# Patient Record
Sex: MALE | Race: WHITE | NOT HISPANIC OR LATINO | Employment: OTHER | URBAN - METROPOLITAN AREA
[De-identification: names, ages, dates, MRNs, and addresses within clinical notes are randomized per-mention and may not be internally consistent; named-entity substitution may affect disease eponyms.]

---

## 2017-01-16 ENCOUNTER — MYC MEDICAL ADVICE (OUTPATIENT)
Dept: FAMILY MEDICINE | Facility: CLINIC | Age: 72
End: 2017-01-16

## 2017-01-16 DIAGNOSIS — R07.89 ATYPICAL CHEST PAIN: ICD-10-CM

## 2017-01-16 DIAGNOSIS — N40.0 BENIGN NON-NODULAR PROSTATIC HYPERPLASIA WITHOUT LOWER URINARY TRACT SYMPTOMS: ICD-10-CM

## 2017-01-16 DIAGNOSIS — E78.5 HYPERLIPIDEMIA LDL GOAL <100: ICD-10-CM

## 2017-01-16 DIAGNOSIS — I10 ESSENTIAL HYPERTENSION WITH GOAL BLOOD PRESSURE LESS THAN 140/90: ICD-10-CM

## 2017-01-16 RX ORDER — TAMSULOSIN HYDROCHLORIDE 0.4 MG/1
0.4 CAPSULE ORAL DAILY
Qty: 30 CAPSULE | Refills: 9 | Status: SHIPPED | OUTPATIENT
Start: 2017-01-16 | End: 2017-01-16

## 2017-01-16 RX ORDER — PRAVASTATIN SODIUM 20 MG
20 TABLET ORAL DAILY
Qty: 90 TABLET | Refills: 1 | Status: SHIPPED | OUTPATIENT
Start: 2017-01-16 | End: 2017-08-18

## 2017-01-16 RX ORDER — GLIPIZIDE 5 MG/1
TABLET ORAL
Qty: 90 TABLET | Refills: 1 | Status: SHIPPED | OUTPATIENT
Start: 2017-01-16 | End: 2017-03-21

## 2017-01-16 RX ORDER — TAMSULOSIN HYDROCHLORIDE 0.4 MG/1
0.4 CAPSULE ORAL DAILY
Qty: 90 CAPSULE | Refills: 1 | Status: SHIPPED | OUTPATIENT
Start: 2017-01-16 | End: 2017-10-10

## 2017-01-16 RX ORDER — METOPROLOL SUCCINATE 25 MG/1
12.5 TABLET, EXTENDED RELEASE ORAL 2 TIMES DAILY
Qty: 90 TABLET | Refills: 1 | Status: SHIPPED | OUTPATIENT
Start: 2017-01-16 | End: 2017-10-10

## 2017-01-16 RX ORDER — LISINOPRIL 2.5 MG/1
2.5 TABLET ORAL 2 TIMES DAILY
Qty: 180 TABLET | Refills: 1 | Status: SHIPPED | OUTPATIENT
Start: 2017-01-16 | End: 2017-10-10

## 2017-01-16 RX ORDER — GLIPIZIDE 5 MG/1
TABLET ORAL
Qty: 90 TABLET | Refills: 2 | Status: SHIPPED | OUTPATIENT
Start: 2017-01-16 | End: 2017-01-16

## 2017-01-16 RX ORDER — DOXAZOSIN 4 MG/1
4 TABLET ORAL AT BEDTIME
Qty: 90 TABLET | Refills: 1 | Status: SHIPPED | OUTPATIENT
Start: 2017-01-16 | End: 2017-09-12

## 2017-01-24 ENCOUNTER — TELEPHONE (OUTPATIENT)
Dept: FAMILY MEDICINE | Facility: CLINIC | Age: 72
End: 2017-01-24

## 2017-01-24 DIAGNOSIS — I10 ESSENTIAL HYPERTENSION WITH GOAL BLOOD PRESSURE LESS THAN 140/90: ICD-10-CM

## 2017-01-24 NOTE — TELEPHONE ENCOUNTER
A referral is recommended for health  in regards to diabetes/htn.  Referral written and sent to health  to contact patient.    Thanks  CAMMIE Acharya---[facility]

## 2017-02-21 ENCOUNTER — OFFICE VISIT (OUTPATIENT)
Dept: CARDIOLOGY | Facility: CLINIC | Age: 72
End: 2017-02-21
Payer: MEDICARE

## 2017-02-21 VITALS
DIASTOLIC BLOOD PRESSURE: 72 MMHG | HEART RATE: 60 BPM | BODY MASS INDEX: 26.63 KG/M2 | WEIGHT: 175.7 LBS | HEIGHT: 68 IN | SYSTOLIC BLOOD PRESSURE: 130 MMHG

## 2017-02-21 DIAGNOSIS — E78.5 HYPERLIPIDEMIA LDL GOAL <100: ICD-10-CM

## 2017-02-21 DIAGNOSIS — I10 ESSENTIAL HYPERTENSION WITH GOAL BLOOD PRESSURE LESS THAN 140/90: ICD-10-CM

## 2017-02-21 DIAGNOSIS — R07.89 ATYPICAL CHEST PAIN: ICD-10-CM

## 2017-02-21 DIAGNOSIS — R35.1 NOCTURIA: ICD-10-CM

## 2017-02-21 DIAGNOSIS — R00.2 HEART PALPITATIONS: ICD-10-CM

## 2017-02-21 DIAGNOSIS — R06.09 DYSPNEA ON EXERTION: ICD-10-CM

## 2017-02-21 DIAGNOSIS — R42 LIGHTHEADEDNESS: ICD-10-CM

## 2017-02-21 DIAGNOSIS — R53.83 FATIGUE, UNSPECIFIED TYPE: ICD-10-CM

## 2017-02-21 PROCEDURE — 99213 OFFICE O/P EST LOW 20 MIN: CPT | Performed by: INTERNAL MEDICINE

## 2017-02-21 NOTE — PATIENT INSTRUCTIONS
For the LISINOPRIL (blood pressure medication), you have pills 2.5mg supposedly twice per day. You may take both of the 2.5mg pills at the same time in the evening and that will help the blood pressure get down to the 110-120 range no problem.

## 2017-02-21 NOTE — MR AVS SNAPSHOT
After Visit Summary   2/21/2017    Kushal Bush    MRN: [id number]           Patient Information     Date Of Birth          1945        Visit Information        Provider Department      2/21/2017 2:45 PM Daniel Win MD [facility]        Today's Diagnoses     Essential hypertension with goal blood pressure less than 140/90        Hyperlipidemia LDL goal <100        Heart palpitations        Fatigue, unspecified type        Dyspnea on exertion        Lightheadedness        Nocturia        Atypical chest pain          Care Instructions    For the LISINOPRIL (blood pressure medication), you have pills 2.5mg supposedly twice per day. You may take both of the 2.5mg pills at the same time in the evening and that will help the blood pressure get down to the 110-120 range no problem.            Follow-ups after your visit        Who to contact     If you have questions or need follow up information about today's clinic visit or your schedule please contact [facility] directly at [phone].  Normal or non-critical lab and imaging results will be communicated to you by [employer], letter or phone within 4 business days after the clinic has received the results. If you do not hear from us within 7 days, please contact the clinic through [employer] or phone. If you have a critical or abnormal lab result, we will notify you by phone as soon as possible.  Submit refill requests through [employer] or call your pharmacy and they will forward the refill request to us. Please allow 3 business days for your refill to be completed.          Additional Information About Your Visit        MyChart Information     [employer] gives you secure access to your electronic health record. If you see a primary care provider, you can also send messages to your care team and make appointments. If you have questions, please call your primary  "care clinic.  If you do not have a primary care provider, please call [phone] and they will assist you.        Care EveryWhere ID     This is your Care EveryWhere ID. This could be used by other organizations to access your [place] medical records  [id number]        Your Vitals Were     Pulse Height BMI (Body Mass Index)             60 1.727 m (5' 8\") 26.72 kg/m2          Blood Pressure from Last 3 Encounters:   02/21/17 130/72   12/19/16 112/70   11/29/16 138/80    Weight from Last 3 Encounters:   02/21/17 79.7 kg (175 lb 11.2 oz)   12/19/16 78.5 kg (173 lb)   11/29/16 78 kg (172 lb)              We Performed the Following     Follow-Up with Cardiologist          Today's Medication Changes          These changes are accurate as of: 2/21/17  3:17 PM.  If you have any questions, ask your nurse or doctor.               These medicines have changed or have updated prescriptions.        Dose/Directions    metoprolol 25 MG 24 hr tablet   Commonly known as:  TOPROL-XL   This may have changed:  when to take this   Used for:  Atypical chest pain, Essential hypertension with goal blood pressure less than 140/90        Dose:  12.5 mg   Take 0.5 tablets (12.5 mg) by mouth 2 times daily   Quantity:  90 tablet   Refills:  1                Primary Care Provider Office Phone # Fax #    Albert Huggins [phone]       [address] [address]        Thank you!     Thank you for choosing [facility] [place]  for your care. Our goal is always to provide you with excellent care. Hearing back from our patients is one way we can continue to improve our services. Please take a few minutes to complete the written survey that you may receive in the mail after your visit with us. Thank you!             Your Updated Medication List - Protect others around you: Learn how to safely use, store and throw away your medicines at " www.disposemymeds.org.          This list is accurate as of: 2/21/17  3:17 PM.  Always use your most recent med list.                   Brand Name Dispense Instructions for use    aspirin 81 MG tablet     30 tablet    Take 1 tablet (81 mg) by mouth daily       blood glucose monitoring test strip    TRUETRACK TEST    100 each    Use to test blood sugar 2 times daily or as directed.  Ok to substitute alternative if insurance prefers.       doxazosin 4 MG tablet    CARDURA    90 tablet    Take 1 tablet (4 mg) by mouth At Bedtime       glipiZIDE 5 MG tablet    GLUCOTROL    90 tablet    1 tab tid with meals       lisinopril 2.5 MG tablet    PRINIVIL/Zestril    180 tablet    Take 1 tablet (2.5 mg) by mouth 2 times daily       metoprolol 25 MG 24 hr tablet    TOPROL-XL    90 tablet    Take 0.5 tablets (12.5 mg) by mouth 2 times daily       pravastatin 20 MG tablet    PRAVACHOL    90 tablet    Take 1 tablet (20 mg) by mouth daily       tamsulosin 0.4 MG capsule    FLOMAX    90 capsule    Take 1 capsule (0.4 mg) by mouth daily

## 2017-02-21 NOTE — PROGRESS NOTES
Cardiology Progress Note          Assessment and Plan:     1. Mild vascular disease, carotid arteries    Patient is tolerating pravastatin.  Continue this and weight loss for better diabetes control.      2. Hypertension, good control on current regimen with improved side effect profile    Patient may consolidate his lisinopril 2.5 mg twice per day that he is only taking in the evenings to 5 mg once per evening.    At this time, I feel like the patient is on a stable regimen and may follow-up in cardiology as needed in the future.  He is very happy with the care he has received. All questions answered to the best of my ability.      This note was transcribed using electronic voice recognition software and there may be typographical errors present.                Interval History:   The patient is a very pleasant 71 year old whom I met recently in cardiovascular consultation for hypertension and vascular workup.  He was having considerable symptoms on his high dose of Toprol.  These improved quite a bit when his Toprol dose was reduced.  He feels well.  He denies any exertional chest discomfort.  He is trying to lose weight but has had difficulty.  He does have diabetes on oral hypoglycemics.  He sometimes forgets to take his daytime antihypertensives.  He has better compliance in the evening.      We reviewed his vascular testing which has mild plaque in the carotid arteries, negative stress echocardiogram without significant structural abnormality and Ziopatch monitor with brief, asymptomatic SVT.                       Review of Systems:   Review of Systems:  Skin:  Negative     Eyes:  Positive for glasses  ENT:  Positive for postnasal drainage  Respiratory:  Positive for    Cardiovascular:    Positive for;fatigue;lightheadedness  Gastroenterology: Negative    Genitourinary:  not assessed    Musculoskeletal:  Positive for arthritis  Neurologic:  Negative    Psychiatric:  not assessed    Heme/Lymph/Imm:  Positive  "for night sweats  Endocrine:  Positive for diabetes              Physical Exam:     Vitals: /72 (BP Location: Right arm, Patient Position: Chair, Cuff Size: Adult Regular)  Pulse 60  Ht 1.727 m (5' 8\")  Wt 79.7 kg (175 lb 11.2 oz)  BMI 26.72 kg/m2  Constitutional:  cooperative, alert and oriented, well developed, well nourished, in no acute distress        Skin:  warm and dry to the touch, no apparent skin lesions or masses noted        Head:  normocephalic, no masses or lesions        Eyes:  pupils equal and round, conjunctivae and lids unremarkable, sclera white, no xanthalasma, EOMS intact, no nystagmus        ENT:  no pallor or cyanosis, dentition good        Neck:  JVP normal        Chest:  normal breath sounds, clear to auscultation, normal A-P diameter, normal symmetry, normal respiratory excursion, no use of accessory muscles        Cardiac: regular rhythm       grade 1;RUSB          Abdomen:      benign    Vascular: pulses full and equal                                      Extremities and Back:  no deformities, clubbing, cyanosis, erythema observed;no edema        Neurological:  affect appropriate, oriented to time, person and place;no gross motor deficits                 Medications:     Current Outpatient Prescriptions   Medication Sig Dispense Refill     blood glucose monitoring (TRUETRACK TEST) test strip Use to test blood sugar 2 times daily or as directed.  Ok to substitute alternative if insurance prefers. 100 each 5     lisinopril (PRINIVIL/ZESTRIL) 2.5 MG tablet Take 1 tablet (2.5 mg) by mouth 2 times daily 180 tablet 1     metoprolol (TOPROL-XL) 25 MG 24 hr tablet Take 0.5 tablets (12.5 mg) by mouth 2 times daily (Patient taking differently: Take 12.5 mg by mouth daily ) 90 tablet 1     pravastatin (PRAVACHOL) 20 MG tablet Take 1 tablet (20 mg) by mouth daily 90 tablet 1     doxazosin (CARDURA) 4 MG tablet Take 1 tablet (4 mg) by mouth At Bedtime 90 tablet 1     glipiZIDE (GLUCOTROL) 5 MG " tablet 1 tab tid with meals 90 tablet 1     aspirin 81 MG tablet Take 1 tablet (81 mg) by mouth daily 30 tablet 1     tamsulosin (FLOMAX) 0.4 MG capsule Take 1 capsule (0.4 mg) by mouth daily (Patient not taking: Reported on 2/21/2017) 90 capsule 1                Data:   All laboratory data reviewed  No results found for this or any previous visit (from the past 24 hour(s)).    All laboratory data reviewed  Lab Results   Component Value Date    CHOL 158 01/14/2013     Lab Results   Component Value Date    HDL 37 01/14/2013     Lab Results   Component Value Date    LDL 79 07/06/2016    LDL 89 01/14/2013     Lab Results   Component Value Date    TRIG 160 01/14/2013     Lab Results   Component Value Date    CHOLHDLRATIO 4.3 01/14/2013     TSH   Date Value Ref Range Status   07/06/2016 3.51 0.40 - 4.00 mU/L Final     Last Basic Metabolic Panel:  Lab Results   Component Value Date     07/06/2016      Lab Results   Component Value Date    POTASSIUM 3.9 07/06/2016     Lab Results   Component Value Date    CHLORIDE 102 07/06/2016     Lab Results   Component Value Date    RAJWINDER 9.1 07/06/2016     Lab Results   Component Value Date    CO2 34 07/06/2016     Lab Results   Component Value Date    BUN 20 07/06/2016     Lab Results   Component Value Date    CR 1.00 07/06/2016     Lab Results   Component Value Date     07/06/2016     Lab Results   Component Value Date    WBC 5.3 04/08/2015     Lab Results   Component Value Date    RBC 5.35 04/08/2015     Lab Results   Component Value Date    HGB 16.2 04/08/2015     Lab Results   Component Value Date    HCT 46.7 04/08/2015     Lab Results   Component Value Date    MCV 87 04/08/2015     Lab Results   Component Value Date    MCH 30.3 04/08/2015     Lab Results   Component Value Date    MCHC 34.7 04/08/2015     Lab Results   Component Value Date    RDW 12.4 04/08/2015     Lab Results   Component Value Date     04/08/2015

## 2017-02-21 NOTE — LETTER
2/21/2017    Albert Huggins MD  [facility]   [address]  [facility] [address]    RE: Pedrobarrera SHRADDHA Bush       Dear Colleague,    I had the pleasure of seeing Kushal Bush in the [facility].    Cardiology Progress Note          Assessment and Plan:     1. Mild vascular disease, carotid arteries    Patient is tolerating pravastatin.  Continue this and weight loss for better diabetes control.      2. Hypertension, good control on current regimen with improved side effect profile    Patient may consolidate his lisinopril 2.5 mg twice per day that he is only taking in the evenings to 5 mg once per evening.    At this time, I feel like the patient is on a stable regimen and may follow-up in cardiology as needed in the future.  He is very happy with the care he has received. All questions answered to the best of my ability.      This note was transcribed using electronic voice recognition software and there may be typographical errors present.                Interval History:   The patient is a very pleasant 71 year old whom I met recently in cardiovascular consultation for hypertension and vascular workup.  He was having considerable symptoms on his high dose of Toprol.  These improved quite a bit when his Toprol dose was reduced.  He feels well.  He denies any exertional chest discomfort.  He is trying to lose weight but has had difficulty.  He does have diabetes on oral hypoglycemics.  He sometimes forgets to take his daytime antihypertensives.  He has better compliance in the evening.      We reviewed his vascular testing which has mild plaque in the carotid arteries, negative stress echocardiogram without significant structural abnormality and Ziopatch monitor with brief, asymptomatic SVT.                       Review of Systems:   Review of Systems:  Skin:  Negative     Eyes:  Positive for glasses  ENT:  Positive for postnasal drainage  Respiratory:  Positive  "for    Cardiovascular:    Positive for;fatigue;lightheadedness  Gastroenterology: Negative    Genitourinary:  not assessed    Musculoskeletal:  Positive for arthritis  Neurologic:  Negative    Psychiatric:  not assessed    Heme/Lymph/Imm:  Positive for night sweats  Endocrine:  Positive for diabetes              Physical Exam:     Vitals: /72 (BP Location: Right arm, Patient Position: Chair, Cuff Size: Adult Regular)  Pulse 60  Ht 1.727 m (5' 8\")  Wt 79.7 kg (175 lb 11.2 oz)  BMI 26.72 kg/m2  Constitutional:  cooperative, alert and oriented, well developed, well nourished, in no acute distress        Skin:  warm and dry to the touch, no apparent skin lesions or masses noted        Head:  normocephalic, no masses or lesions        Eyes:  pupils equal and round, conjunctivae and lids unremarkable, sclera white, no xanthalasma, EOMS intact, no nystagmus        ENT:  no pallor or cyanosis, dentition good        Neck:  JVP normal        Chest:  normal breath sounds, clear to auscultation, normal A-P diameter, normal symmetry, normal respiratory excursion, no use of accessory muscles        Cardiac: regular rhythm       grade 1;RUSB          Abdomen:      benign    Vascular: pulses full and equal                                      Extremities and Back:  no deformities, clubbing, cyanosis, erythema observed;no edema        Neurological:  affect appropriate, oriented to time, person and place;no gross motor deficits                 Medications:     Current Outpatient Prescriptions   Medication Sig Dispense Refill     blood glucose monitoring (TRUETRACK TEST) test strip Use to test blood sugar 2 times daily or as directed.  Ok to substitute alternative if insurance prefers. 100 each 5     lisinopril (PRINIVIL/ZESTRIL) 2.5 MG tablet Take 1 tablet (2.5 mg) by mouth 2 times daily 180 tablet 1     metoprolol (TOPROL-XL) 25 MG 24 hr tablet Take 0.5 tablets (12.5 mg) by mouth 2 times daily (Patient taking differently: Take " 12.5 mg by mouth daily ) 90 tablet 1     pravastatin (PRAVACHOL) 20 MG tablet Take 1 tablet (20 mg) by mouth daily 90 tablet 1     doxazosin (CARDURA) 4 MG tablet Take 1 tablet (4 mg) by mouth At Bedtime 90 tablet 1     glipiZIDE (GLUCOTROL) 5 MG tablet 1 tab tid with meals 90 tablet 1     aspirin 81 MG tablet Take 1 tablet (81 mg) by mouth daily 30 tablet 1     tamsulosin (FLOMAX) 0.4 MG capsule Take 1 capsule (0.4 mg) by mouth daily (Patient not taking: Reported on 2/21/2017) 90 capsule 1                Data:   All laboratory data reviewed  No results found for this or any previous visit (from the past 24 hour(s)).    All laboratory data reviewed  Lab Results   Component Value Date    CHOL 158 01/14/2013     Lab Results   Component Value Date    HDL 37 01/14/2013     Lab Results   Component Value Date    LDL 79 07/06/2016    LDL 89 01/14/2013     Lab Results   Component Value Date    TRIG 160 01/14/2013     Lab Results   Component Value Date    CHOLHDLRATIO 4.3 01/14/2013     TSH   Date Value Ref Range Status   07/06/2016 3.51 0.40 - 4.00 mU/L Final     Last Basic Metabolic Panel:  Lab Results   Component Value Date     07/06/2016      Lab Results   Component Value Date    POTASSIUM 3.9 07/06/2016     Lab Results   Component Value Date    CHLORIDE 102 07/06/2016     Lab Results   Component Value Date    RAJWINDER 9.1 07/06/2016     Lab Results   Component Value Date    CO2 34 07/06/2016     Lab Results   Component Value Date    BUN 20 07/06/2016     Lab Results   Component Value Date    CR 1.00 07/06/2016     Lab Results   Component Value Date     07/06/2016     Lab Results   Component Value Date    WBC 5.3 04/08/2015     Lab Results   Component Value Date    RBC 5.35 04/08/2015     Lab Results   Component Value Date    HGB 16.2 04/08/2015     Lab Results   Component Value Date    HCT 46.7 04/08/2015     Lab Results   Component Value Date    MCV 87 04/08/2015     Lab Results   Component Value Date    MCH  30.3 04/08/2015     Lab Results   Component Value Date    MCHC 34.7 04/08/2015     Lab Results   Component Value Date    RDW 12.4 04/08/2015     Lab Results   Component Value Date     04/08/2015     Thank you for allowing me to participate in the care of your patient.    Sincerely,     Daniel Win MD     [facility]

## 2017-02-24 ENCOUNTER — TELEPHONE (OUTPATIENT)
Dept: FAMILY MEDICINE | Facility: CLINIC | Age: 72
End: 2017-02-24

## 2017-02-24 NOTE — LETTER
[facility]  [address] [address] [id number] [phone]  March 20, 2017    Kushal Bush  [address]  [facility] [address]    Dear Kushal,    I care about your health and have reviewed your health plan. I have reviewed your medical conditions, medication list, and lab results and am making recommendations based on this review, to better manage your health.    You are in particular need of attention regarding:  -Diabetes    I am recommending that you:  {recommendations:-schedule a WELLNESS (Physical) APPOINTMENT with me.   I will check fasting labs the same day - nothing to eat except water and meds for 8-10 hours prior.    Here is a list of Health Maintenance topics that are due now or due soon:  Health Maintenance Due   Topic Date Due     AORTIC ANEURYSM SCREENING (SYSTEM ASSIGNED)  10/29/2009     MEDICARE ANNUAL WELLNESS VISIT  04/15/2016     A1C Q3 MO( NO INBASKET)  02/21/2017     EYE EXAM Q1 YEAR( NO INBASKET)  04/11/2017       Please call us at [phone] (or use [employer]) to address the above recommendations.     Thank you for trusting [facility] and we appreciate the opportunity to serve you.  We look forward to supporting your healthcare needs in the future.    Healthy Regards,    Albert Huggins MD

## 2017-02-24 NOTE — TELEPHONE ENCOUNTER
Panel Management Review      Patient has the following on his problem list:     Asthma review     ACT Total Scores 11/21/2016   ACT TOTAL SCORE -   ASTHMA ER VISITS -   ASTHMA HOSPITALIZATIONS -   ACT TOTAL SCORE (Goal Greater than or Equal to 20) 24   In the past 12 months, how many times did you visit the emergency room for your asthma without being admitted to the hospital? 0   In the past 12 months, how many times were you hospitalized overnight because of your asthma? 0      1. Is Asthma diagnosis on the Problem List? Yes    2. Is Asthma listed on Health Maintenance? Yes    3. Patient is due for:  none    Diabetes    ASA: Passed    Last A1C  Lab Results   Component Value Date    A1C 9.2 11/21/2016    A1C 9.7 07/06/2016    A1C 8.1 11/30/2015    A1C 13.9 04/08/2015    A1C 9.6 06/09/2014     A1C tested: Failed    Last LDL:    Lab Results   Component Value Date    CHOL 158 01/14/2013     Lab Results   Component Value Date    HDL 37 01/14/2013     Lab Results   Component Value Date    LDL 79 07/06/2016    LDL 89 01/14/2013     Lab Results   Component Value Date    TRIG 160 01/14/2013     Lab Results   Component Value Date    CHOLHDLRATIO 4.3 01/14/2013     No results found for: NHDL    Is the patient on a Statin? YES             Is the patient on Aspirin? YES    Medications     HMG CoA Reductase Inhibitors    pravastatin (PRAVACHOL) 20 MG tablet    Salicylates    aspirin 81 MG tablet          Last three blood pressure readings:  BP Readings from Last 3 Encounters:   02/21/17 130/72   12/19/16 112/70   11/29/16 138/80       Date of last diabetes office visit: 11/21/16     Tobacco History:     History   Smoking Status     Never Smoker   Smokeless Tobacco     Never Used         Hypertension   Last three blood pressure readings:  BP Readings from Last 3 Encounters:   02/21/17 130/72   12/19/16 112/70   11/29/16 138/80     Blood pressure: Passed    HTN Guidelines:  Age 18-59 BP range:  Less than 140/90  Age 60-85 with  Diabetes:  Less than 140/90  Age 60-85 without Diabetes:  less than 150/90      Composite cancer screening  Chart review shows that this patient is due/due soon for the following None  Summary:    Patient is due/failing the following:   Wellness exam and A1C    Action needed:   Patient needs office visit for Wellness exam and A1C.    Type of outreach:    Phone, left message for patient to call back.     Questions for provider review:    None                                                                                                                                    Suzette Sy MA       Chart routed to Care Team [id number] .

## 2017-02-24 NOTE — LETTER
[facility]  [address] [address] [facility] [phone] [phone]  March 20, 2017    Kushal Bush  [address]  [place] [facility] [address]    Dear Kushal,    I care about your health and have reviewed your health plan. I have reviewed your medical conditions, medication list, and lab results and am making recommendations based on this review, to better manage your health.    You are in particular need of attention regarding:  -Diabetes    I am recommending that you:  -schedule a WELLNESS (Physical) APPOINTMENT with me.   I will check fasting labs the same day - nothing to eat except water and meds for 8-10 hours prior.    Here is a list of Health Maintenance topics that are due now or due soon:  Health Maintenance Due   Topic Date Due     AORTIC ANEURYSM SCREENING (SYSTEM ASSIGNED)  10/29/2009     MEDICARE ANNUAL WELLNESS VISIT  04/15/2016     A1C Q3 MO( NO INBASKET)  02/21/2017     EYE EXAM Q1 YEAR( NO INBASKET)  04/11/2017       Please call us at [phone] (or use [employer]) to address the above recommendations.     Thank you for trusting [facility] and we appreciate the opportunity to serve you.  We look forward to supporting your healthcare needs in the future.    Healthy Regards,    Albert Huggins MD

## 2017-03-07 ENCOUNTER — TELEPHONE (OUTPATIENT)
Dept: NURSING | Facility: CLINIC | Age: 72
End: 2017-03-07

## 2017-03-14 ENCOUNTER — TELEPHONE (OUTPATIENT)
Dept: NURSING | Facility: CLINIC | Age: 72
End: 2017-03-14

## 2017-03-20 ENCOUNTER — OFFICE VISIT (OUTPATIENT)
Dept: FAMILY MEDICINE | Facility: CLINIC | Age: 72
End: 2017-03-20
Payer: MEDICARE

## 2017-03-20 VITALS
SYSTOLIC BLOOD PRESSURE: 110 MMHG | DIASTOLIC BLOOD PRESSURE: 59 MMHG | RESPIRATION RATE: 14 BRPM | TEMPERATURE: 97.7 F | WEIGHT: 172.6 LBS | HEIGHT: 68 IN | HEART RATE: 64 BPM | BODY MASS INDEX: 26.16 KG/M2

## 2017-03-20 DIAGNOSIS — B35.1 ONYCHOMYCOSIS: ICD-10-CM

## 2017-03-20 DIAGNOSIS — Z13.6 ENCOUNTER FOR ABDOMINAL AORTIC ANEURYSM (AAA) SCREENING: ICD-10-CM

## 2017-03-20 DIAGNOSIS — D36.9 TUBULAR ADENOMA: Primary | ICD-10-CM

## 2017-03-20 LAB
ALBUMIN SERPL-MCNC: 4 G/DL (ref 3.4–5)
ALP SERPL-CCNC: 65 U/L (ref 40–150)
ALT SERPL W P-5'-P-CCNC: 48 U/L (ref 0–70)
ANION GAP SERPL CALCULATED.3IONS-SCNC: 9 MMOL/L (ref 3–14)
AST SERPL W P-5'-P-CCNC: 32 U/L (ref 0–45)
BILIRUB SERPL-MCNC: 0.9 MG/DL (ref 0.2–1.3)
BUN SERPL-MCNC: 27 MG/DL (ref 7–30)
CALCIUM SERPL-MCNC: 9.3 MG/DL (ref 8.5–10.1)
CHLORIDE SERPL-SCNC: 104 MMOL/L (ref 94–109)
CO2 SERPL-SCNC: 27 MMOL/L (ref 20–32)
CREAT SERPL-MCNC: 0.95 MG/DL (ref 0.66–1.25)
GFR SERPL CREATININE-BSD FRML MDRD: 78 ML/MIN/1.7M2
GLUCOSE SERPL-MCNC: 94 MG/DL (ref 70–99)
HBA1C MFR BLD: 8.1 % (ref 4.3–6)
POTASSIUM SERPL-SCNC: 4 MMOL/L (ref 3.4–5.3)
PROT SERPL-MCNC: 7.4 G/DL (ref 6.8–8.8)
SODIUM SERPL-SCNC: 140 MMOL/L (ref 133–144)

## 2017-03-20 PROCEDURE — 36415 COLL VENOUS BLD VENIPUNCTURE: CPT | Performed by: FAMILY MEDICINE

## 2017-03-20 PROCEDURE — 99214 OFFICE O/P EST MOD 30 MIN: CPT | Performed by: FAMILY MEDICINE

## 2017-03-20 PROCEDURE — 80053 COMPREHEN METABOLIC PANEL: CPT | Performed by: FAMILY MEDICINE

## 2017-03-20 PROCEDURE — 83036 HEMOGLOBIN GLYCOSYLATED A1C: CPT | Performed by: FAMILY MEDICINE

## 2017-03-20 RX ORDER — TERBINAFINE HYDROCHLORIDE 250 MG/1
250 TABLET ORAL DAILY
Qty: 90 TABLET | Refills: 0 | Status: SHIPPED | OUTPATIENT
Start: 2017-03-20 | End: 2018-11-05

## 2017-03-20 NOTE — NURSING NOTE
"Chief Complaint   Patient presents with     Gastrointestinal Problem     Diarrhea      Fungal Infection     left big toe       Initial /59 (BP Location: Left arm, Patient Position: Chair, Cuff Size: Adult Regular)  Pulse 64  Temp 97.7  F (36.5  C) (Oral)  Resp 14  Ht 5' 8\" (1.727 m)  Wt 172 lb 9.6 oz (78.3 kg)  BMI 26.24 kg/m2 Estimated body mass index is 26.24 kg/(m^2) as calculated from the following:    Height as of this encounter: 5' 8\" (1.727 m).    Weight as of this encounter: 172 lb 9.6 oz (78.3 kg).  Medication Reconciliation:   "

## 2017-03-20 NOTE — PROGRESS NOTES
SUBJECTIVE:                                                    Kushal Bush is a 71 year old male who presents to clinic today for the following health issues:    Diarrhea     Onset: Since last summer. Patient has intermittent constipation and diarrhea.    Description:   Consistency of stool: Runny  Blood in stool: no   Number of loose stools in past 24 hours: every few days     Progression of Symptoms:  same    Accompanying Signs & Symptoms:  Fever: no   Nausea or vomiting; no   Abdominal pain: YES  Episodes of constipation: YES  Weight loss: YES  Wt Readings from Last 3 Encounters:   03/20/17 78.3 kg (172 lb 9.6 oz)   02/21/17 79.7 kg (175 lb 11.2 oz)   12/19/16 78.5 kg (173 lb)   Body mass index is 26.24 kg/(m^2).   History:   Ill contacts: no   Recent use of antibiotics: no    Recent travels: no          Recent medication-new or changes (Rx or OTC): no     Precipitating factors:       Alleviating factors:          Therapies Tried and outcome:  Probiotics       Tubular adenoma  (primary encounter diagnosis): Patient is due for a colonoscopy. History tubular [id number]        Onychomycosis: The patient states that he had onychomycosis of his left hallux three years ago that resolved with therapy. However, the patient reports that the fungus returned.         Uncontrolled type 2 diabetes mellitus without complication, without long-term current use of insulin (H):   Lab Results   Component Value Date    A1C 9.2 11/21/2016    A1C 9.7 07/06/2016    A1C 8.1 11/30/2015    A1C 13.9 04/08/2015    A1C 9.6 06/09/2014     Glucose   Date Value Ref Range Status   07/06/2016 147 (H) 70 - 99 mg/dL Final         Encounter for abdominal aortic aneurysm (AAA) screening: Offered routine  Past Medical History   Diagnosis Date     Asthma      Benign non-nodular prostatic hyperplasia without lower urinary tract symptoms 7/18/2016     BPH (benign prostatic hyperplasia) 9/14/2011     Campylobacter diarrhea 7/18/2016     [facility]  arthritis, thumb, degenerative 11/25/2013     Diabetes type 2, uncontrolled (H) 7/18/2016     Essential hypertension with goal blood pressure less than 140/90 7/18/2016     Fatigue, unspecified type 7/18/2016     Heart palpitations 9/14/2011     HTN (hypertension) 9/14/2011     HTN, goal below 140/90 1/4/2012     Hyperglycemia 10/27/2011     Hyperlipidemia LDL goal <100 10/27/2011     Impacted cerumen 9/14/2011     Intermittent asthma 9/14/2011     Lumbar radiculopathy 11/7/2011     Post-nasal drainage 9/14/2011     Presbycusis 9/14/2011     Seborrheic keratoses 9/14/2011     Tubular adenoma of colon      Type 2 diabetes mellitus without complications (H) 10/12/2015     Type 2 diabetes, HbA1c goal < 7% (H) 1/6/2012     Type II diabetes mellitus with HbA1C goal between 7 and 8 6/18/2013     Uncontrolled type 2 diabetes mellitus without complication, without long-term current use of insulin (H) 12/27/2016       Past Surgical History   Procedure Laterality Date     Ingrown toenail       Colonoscopy  1/29/14     Colonoscopy  1/29/2014     Procedure: COMBINED COLONOSCOPY, SINGLE BIOPSY/POLYPECTOMY BY BIOPSY;  COLONOSCOPY two polyps with jumbo forceps;  Surgeon: Alina Hopkins MD;  Location: [facility]     Eye surgery Right 12/14/16     cataract removal        Family History   Problem Relation Age of Onset     CANCER Mother      age 89, uterine/breast     HEART DISEASE Father      51       Social History   Substance Use Topics     Smoking status: Never Smoker     Smokeless tobacco: Never Used     Alcohol use Yes      Comment: occas.           ROS: Patient is feeling well. Otherwise a complete review of symptoms is negative    This document serves as a record of the services and decisions personally performed and made by Joseluis Price MD. It was created on his behalf by Brian Del Rosario a trained medical scribe. The creation of this document is based the provider's statements to the medical scribe. Brian Del Rosario March 20,  "2017 2:40 PM  OBJECTIVE:                                                    /59 (BP Location: Left arm, Patient Position: Chair, Cuff Size: Adult Regular)  Pulse 64  Temp 97.7  F (36.5  C) (Oral)  Resp 14  Ht 1.727 m (5' 8\")  Wt 78.3 kg (172 lb 9.6 oz)  BMI 26.24 kg/m2  Body mass index is 26.24 kg/(m^2).  GEN: Healthy, Alert, No distress  FEET: onychomycosis of left hallux      ASSESSMENT/PLAN:                                                    (D36.9) Tubular adenoma  (primary encounter diagnosis)  Comment: Discussed, referred  Plan: GASTROENTEROLOGY ADULT REF PROCEDURE ONLY  Assuming nl study, increase fiber in diet            (B35.1) Onychomycosis  Comment: Rx. Broaden database  Plan: terbinafine (LAMISIL) 250 MG tablet,         Comprehensive metabolic panel This appears much more extensive than pt History suggests. Even so, given history, repeat            (E11.65) Uncontrolled type 2 diabetes mellitus without complication, without long-term current use of insulin (H)  Comment: Assess   Lab Results   Component Value Date    A1C 9.2 11/21/2016    A1C 9.7 07/06/2016    A1C 8.1 11/30/2015    A1C 13.9 04/08/2015    A1C 9.6 06/09/2014     Glucose   Date Value Ref Range Status   07/06/2016 147 (H) 70 - 99 mg/dL Final   Plan: Comprehensive metabolic panel, Hemoglobin A1c                (Z13.6) Encounter for abdominal aortic aneurysm (AAA) screening  Comment: Broaden database  Plan: [place] Aorta Medicare AAA Screening              RCK in 3-6 months, depending upon lab results     The information in this document, created by a scribe for me, accurately reflects the services I personally performed and the decisions made by me. I have reviewed and approved this document for accuracy. 2:40 PM March 20, 2017    PHONG MENDEZ MD  [facility]    "

## 2017-03-20 NOTE — MR AVS SNAPSHOT
After Visit Summary   3/20/2017    Kushal Bush    MRN: [id number]           Patient Information     Date Of Birth          1945        Visit Information        Provider Department      3/20/2017 2:15 PM Albert Huggins MD [facility]        Today's Diagnoses     Tubular adenoma    -  1    Onychomycosis        Uncontrolled type 2 diabetes mellitus without complication, without long-term current use of insulin (H)        Encounter for abdominal aortic aneurysm (AAA) screening          Care Instructions    Fiber (vegetables) 3 servings every meal        Follow-ups after your visit        Additional Services     GASTROENTEROLOGY ADULT REF PROCEDURE ONLY       Last Lab Result: Creatinine (mg/dL)       Date                     Value                 07/06/2016               1.00             ----------  Body mass index is 26.24 kg/(m^2).     Needed:  No  Language:  English    Patient will be contacted to schedule procedure.     Please be aware that coverage of these services is subject to the terms and limitations of your health insurance plan.  Call member services at your health plan with any benefit or coverage questions.  Any procedures must be performed at a [facility] facility OR coordinated by your clinic's referral office.    Please bring the following with you to your appointment:    (1) Any X-Rays, CTs or MRIs which have been performed.  Contact the facility where they were done to arrange for  prior to your scheduled appointment.    (2) List of current medications   (3) This referral request   (4) Any documents/labs given to you for this referral                  Your next 10 appointments already scheduled     Mar 22, 2017  2:00 PM CDT   Nurse Only with HEALTH  - [facility]   [facility]    [address] [place] [id number] [phone]              Future tests that were ordered  "for you today     Open Future Orders        Priority Expected Expires Ordered    [place] Aorta Medicare AAA Screening Routine  3/20/2018 3/20/2017            Who to contact     If you have questions or need follow up information about today's clinic visit or your schedule please contact [facility] directly at [phone].  Normal or non-critical lab and imaging results will be communicated to you by MyChart, letter or phone within 4 business days after the clinic has received the results. If you do not hear from us within 7 days, please contact the clinic through [employer] or phone. If you have a critical or abnormal lab result, we will notify you by phone as soon as possible.  Submit refill requests through [employer] [facility] [employer] or call your pharmacy and they will forward the refill request to us. Please allow 3 business days for your refill to be completed.          Additional Information About Your Visit        MyChart Information     [facility] [employer] [facility] [employer] gives you secure access to your electronic health record. If you see a primary care provider, you can also send messages to your care team and make appointments. If you have questions, please call your primary care clinic.  If you do not have a primary care provider, please call [phone] and they will assist you.        Care EveryWhere ID     This is your Care EveryWhere ID. This could be used by other organizations to access your [place] medical records  [id number]        Your Vitals Were     Pulse Temperature Respirations Height BMI (Body Mass Index)       64 97.7  F (36.5  C) (Oral) 14 5' 8\" (1.727 m) 26.24 kg/m2        Blood Pressure from Last 3 Encounters:   03/20/17 110/59   02/21/17 130/72   12/19/16 112/70    Weight from Last 3 Encounters:   03/20/17 172 lb 9.6 oz (78.3 kg)   02/21/17 175 lb 11.2 oz (79.7 kg)   12/19/16 173 lb (78.5 kg)              We Performed the Following     Comprehensive metabolic panel     GASTROENTEROLOGY ADULT REF PROCEDURE ONLY     " Hemoglobin A1c          Today's Medication Changes          These changes are accurate as of: 3/20/17  2:55 PM.  If you have any questions, ask your nurse or doctor.               Start taking these medicines.        Dose/Directions    terbinafine 250 MG tablet   Commonly known as:  lamISIL   Used for:  Onychomycosis   Started by:  Albert Huggins MD        Dose:  250 mg   Take 1 tablet (250 mg) by mouth daily   Quantity:  90 tablet   Refills:  0         These medicines have changed or have updated prescriptions.        Dose/Directions    metoprolol 25 MG 24 hr tablet   Commonly known as:  TOPROL-XL   This may have changed:  when to take this   Used for:  Atypical chest pain, Essential hypertension with goal blood pressure less than 140/90        Dose:  12.5 mg   Take 0.5 tablets (12.5 mg) by mouth 2 times daily   Quantity:  90 tablet   Refills:  1            Where to get your medicines      These medications were sent to [facility] [address] [place] [address]    Hours:  24-hours Phone:  [phone]     terbinafine 250 MG tablet                Primary Care Provider Office Phone # Fax #    Albert Huggins [phone]       [address] [address]        Thank you!     Thank you for choosing [facility]  for your care. Our goal is always to provide you with excellent care. Hearing back from our patients is one way we can continue to improve our services. Please take a few minutes to complete the written survey that you may receive in the mail after your visit with us. Thank you!             Your Updated Medication List - Protect others around you: Learn how to safely use, store and throw away your medicines at www.disposemymeds.org.          This list is accurate as of: 3/20/17  2:55 PM.  Always use your most recent med list.                    Brand Name Dispense Instructions for use    aspirin 81 MG tablet     30 tablet    Take 1 tablet (81 mg) by mouth daily       blood glucose monitoring test strip    TRUETRACK TEST    100 each    Use to test blood sugar 2 times daily or as directed.  Ok to substitute alternative if insurance prefers.       doxazosin 4 MG tablet    CARDURA    90 tablet    Take 1 tablet (4 mg) by mouth At Bedtime       glipiZIDE 5 MG tablet    GLUCOTROL    90 tablet    1 tab tid with meals       lisinopril 2.5 MG tablet    PRINIVIL/Zestril    180 tablet    Take 1 tablet (2.5 mg) by mouth 2 times daily       metoprolol 25 MG 24 hr tablet    TOPROL-XL    90 tablet    Take 0.5 tablets (12.5 mg) by mouth 2 times daily       pravastatin 20 MG tablet    PRAVACHOL    90 tablet    Take 1 tablet (20 mg) by mouth daily       tamsulosin 0.4 MG capsule    FLOMAX    90 capsule    Take 1 capsule (0.4 mg) by mouth daily       terbinafine 250 MG tablet    lamISIL    90 tablet    Take 1 tablet (250 mg) by mouth daily

## 2017-03-21 ENCOUNTER — MYC MEDICAL ADVICE (OUTPATIENT)
Dept: FAMILY MEDICINE | Facility: CLINIC | Age: 72
End: 2017-03-21

## 2017-03-21 DIAGNOSIS — E78.5 HYPERLIPIDEMIA LDL GOAL <100: ICD-10-CM

## 2017-03-21 DIAGNOSIS — N40.0 BENIGN NON-NODULAR PROSTATIC HYPERPLASIA WITHOUT LOWER URINARY TRACT SYMPTOMS: ICD-10-CM

## 2017-03-21 DIAGNOSIS — R07.89 ATYPICAL CHEST PAIN: ICD-10-CM

## 2017-03-21 DIAGNOSIS — I10 ESSENTIAL HYPERTENSION WITH GOAL BLOOD PRESSURE LESS THAN 140/90: ICD-10-CM

## 2017-03-21 RX ORDER — GLIPIZIDE 10 MG/1
10 TABLET ORAL
Qty: 180 TABLET | Refills: 1 | Status: SHIPPED | OUTPATIENT
Start: 2017-03-21 | End: 2017-10-10

## 2017-03-21 NOTE — TELEPHONE ENCOUNTER
Dr. Huggins-see [employer] message below.  Please advise.  Dorothy Kraft RN      Entered by Albert Huggins MD at 3/21/2017 11:22 AM   Read by Kushal Bush at 3/21/2017 12:05 PM   Diabetes is better. 1 more pill a day might do you good..May  I?   Albert Huggins MD

## 2017-03-22 ENCOUNTER — ALLIED HEALTH/NURSE VISIT (OUTPATIENT)
Dept: NURSING | Facility: CLINIC | Age: 72
End: 2017-03-22

## 2017-03-22 PROCEDURE — 99207 ZZC HEALTH COACHING, NO CHARGE: CPT

## 2017-03-22 NOTE — MR AVS SNAPSHOT
After Visit Summary   3/22/2017    Kushal Bush    MRN: [id number]           Patient Information     Date Of Birth          1945        Visit Information        Provider Department      3/22/2017 2:00 PM HEALTH  - [address]        Today's Diagnoses     Uncontrolled type 2 diabetes mellitus without complication, without long-term current use of insulin (H)    -  1      Care Instructions      2017    [facility]  [address] [place] [address]  [phone]  [phone]  Health Coaching Progress Note    Patient Name: Kushal Bush Date: 2017      GOALS: Patient will work on the following goals until our next meetin) Try to add more vegetables to your diet each day (use sheet for veggies that don't affect blood sugar)  2) Pay more attention to carbohydrate intake/try using the carb guideline booklet as reference to keep meals between 60-75 grams of carbs and snacks 15-30 grams      Plan: (Homework, other):  Patient was encouraged to continue to seek condition-related information and education, as well as schedule a follow up appointment with the Health  in 6 weeks as requested by patient.. Patient has set self-identified goals and will monitor progress until the next appointment.  Scheduled our second coaching session for Monday May 8th at 9am over the phone.  Paddy Huertas        Resources:              Follow-ups after your visit        Your next 10 appointments already scheduled     May 08, 2017  9:00 AM CDT   Telephone Visit with HEALTH  - [address] ([facility])    [address] [facility] [address]   [phone]           Note: this is not an onsite visit; there is no need to come to the facility.              Who to contact     If you have questions or need follow up information  about today's clinic visit or your schedule please contact [facility] directly at [phone].  Normal or non-critical lab and imaging results will be communicated to you by [employer], letter or phone within 4 business days after the clinic has received the results. If you do not hear from us within 7 days, please contact the clinic through [employer] or phone. If you have a critical or abnormal lab result, we will notify you by phone as soon as possible.  Submit refill requests through [employer] or call your pharmacy and they will forward the refill request to us. Please allow 3 business days for your refill to be completed.          Additional Information About Your Visit        [employer] Information     [employer] gives you secure access to your electronic health record. If you see a primary care provider, you can also send messages to your care team and make appointments. If you have questions, please call your primary care clinic.  If you do not have a primary care provider, please call [phone] and they will assist you.        Care EveryWhere ID     This is your Care EveryWhere ID. This could be used by other organizations to access your [place] medical records  [id number]         Blood Pressure from Last 3 Encounters:   03/20/17 110/59   02/21/17 130/72   12/19/16 112/70    Weight from Last 3 Encounters:   03/20/17 172 lb 9.6 oz (78.3 kg)   02/21/17 175 lb 11.2 oz (79.7 kg)   12/19/16 173 lb (78.5 kg)              Today, you had the following     No orders found for display         Today's Medication Changes          These changes are accurate as of: 3/22/17  3:40 PM.  If you have any questions, ask your nurse or doctor.               These medicines have changed or have updated prescriptions.        Dose/Directions    metoprolol 25 MG 24 hr tablet   Commonly known as:  TOPROL-XL   This may have changed:  when to take this   Used for:  Atypical chest pain, Essential hypertension with goal blood  pressure less than 140/90        Dose:  12.5 mg   Take 0.5 tablets (12.5 mg) by mouth 2 times daily   Quantity:  90 tablet   Refills:  1                Primary Care Provider Office Phone # Fax #    Albert Huggins [phone]       [facility] [address]  [facility] [address]        Thank you!     Thank you for choosing [facility]  for your care. Our goal is always to provide you with excellent care. Hearing back from our patients is one way we can continue to improve our services. Please take a few minutes to complete the written survey that you may receive in the mail after your visit with us. Thank you!             Your Updated Medication List - Protect others around you: Learn how to safely use, store and throw away your medicines at www.disposemymeds.org.          This list is accurate as of: 3/22/17  3:40 PM.  Always use your most recent med list.                   Brand Name Dispense Instructions for use    aspirin 81 MG tablet     30 tablet    Take 1 tablet (81 mg) by mouth daily       blood glucose monitoring test strip    TRUETRACK TEST    100 each    Use to test blood sugar 2 times daily or as directed.  Ok to substitute alternative if insurance prefers.       doxazosin 4 MG tablet    CARDURA    90 tablet    Take 1 tablet (4 mg) by mouth At Bedtime       glipiZIDE 10 MG tablet    GLUCOTROL    180 tablet    Take 1 tablet (10 mg) by mouth 2 times daily (before meals)       lisinopril 2.5 MG tablet    PRINIVIL/Zestril    180 tablet    Take 1 tablet (2.5 mg) by mouth 2 times daily       metoprolol 25 MG 24 hr tablet    TOPROL-XL    90 tablet    Take 0.5 tablets (12.5 mg) by mouth 2 times daily       pravastatin 20 MG tablet    PRAVACHOL    90 tablet    Take 1 tablet (20 mg) by mouth daily       tamsulosin 0.4 MG capsule    FLOMAX    90 capsule    Take 1 capsule (0.4 mg) by mouth daily       terbinafine 250 MG tablet    lamISIL    90 tablet    Take 1  tablet (250 mg) by mouth daily

## 2017-03-22 NOTE — PATIENT INSTRUCTIONS
2017    [address] [address]  [phone]  Health Coaching Progress Note    Patient Name: Kushal Bush Date: 2017      GOALS: Patient will work on the following goals until our next meetin) Try to add more vegetables to your diet each day (use sheet for veggies that don't affect blood sugar)  2) Pay more attention to carbohydrate intake/try using the carb guideline booklet as reference to keep meals between 60-75 grams of carbs and snacks 15-30 grams      Plan: (Homework, other):  Patient was encouraged to continue to seek condition-related information and education, as well as schedule a follow up appointment with the Health  in 6 weeks as requested by patient.. Patient has set self-identified goals and will monitor progress until the next appointment.  Scheduled our second coaching session for Monday May 8th at 9am over the phone.  Paddy Huertas        Resources:

## 2017-03-22 NOTE — NURSING NOTE
March 22, 2017    [facility]  [address] [facility] [address]  [phone]  Health Coaching Progress Note    Patient Name: Kushal Bush Date: March 22, 2017      Session Length: 55      DATA    PRM Master Survey Scores Reviewed: Yes    Core Healthy Days Survey:    Would you say that in general your health is: : Very Good    EDWIN Score (Last Two) 9/14/2011 3/22/2017   EDWIN Raw Score 47 36   Activation Score 77.5 75.5   EDWIN Level 4 4       PHQ-2 Score 7/6/2016 2/9/2016   PHQ-2 Total Score Interpretation - Positive if 3 or more points; Administer PHQ-9 if positive 0 0       No flowsheet data found.    Treatment Objective(s) Addressed in This Session:  Target Behavior(s): diet/weight loss and disease management/lifestyle changes of working to better manage his diabetes by making some changes to his diet-adding more vegetables to most meals throughout the day, being more aware of diet choices-carb intake and portion sizes, using the carb counting guide/referencing how many grams of carb are in a lot of the items we eat each day/trying to follow the recommended carb guidelines for men, being active, and accessing resources.    Current Stressors / Issues:  Travel-used to travel out of the country a lot, doesn't ever want to be in assisted living/nursing home, wants to live a long life, is drawn toward a lot of high carb foods (really likes corn), feels his diet hasn't been the greatest lately-needs to eat more veggies.    What Patient Does Well:   1) Patient is always interested in learning more about health/being healthy  Previous Successes:   1) Patient just had A1C checked a few days ago and it came down to 8.1 from 9.2  Areas in Need of Improvement:   1) Diet  2) Diabetes Management  3) Activity Level  Barriers to Change:   1) Patient likes to travel a lot  2) Patient feels he does everything correctly  3) Not wanting to  change  Reasons for Change:   1) Better health/live long life  2) Manage Diabetes  Plan/Goal for the Next 4 Weeks:  GOAL #1: Try to add more vegetables to your diet each day (use sheet for veggies that don't affect blood sugar)  GOAL #1 Progress Toward Goal: 0% New Goal  GOAL #2: Pay more attention to carbohydrate intake/try using the carb guideline booklet as reference to keep meals between 60-75 grams of carbs and snacks 15-30 grams  GOAL #2 Progress Toward Goal: 0% New Goal    Intervention:  Motivational Interviewing    MI Intervention: Expressed Empathy/Understanding, Supported Autonomy, Collaboration, Evocation, Permission to raise concern or advise, Open-ended questions, Reflections: simple and complex, Rolled with resistance: Simple reflection, Complex reflection, Shifted topic to defuse resistance and Reframed sustain talk in the direction of change and Change talk (evoked)     Change Talk Expressed by the Patient: Ability to change Reasons to change Need to change Committment to change Activation    Provider Response to Change Talk: E - Evoked more info from patient about behavior change, A - Affirmed patient's thoughts, decisions, or attempts at behavior change, R - Reflected patient's change talk and S - Summarized patient's change talk statements    Assessment / Progress on Treatment Objective(s) / Homework:    New Objective established this session - CONTEMPLATION (Considering change and yet undecided); Intervened by assessing the negative and positive thinking (ambivalence) about behavior change         Plan: (Homework, other):  Patient was encouraged to continue to seek condition-related information and education, as well as schedule a follow up appointment with the Health  in 6 weeks as requested by patient.. Patient has set self-identified goals and will monitor progress until the next appointment.  Scheduled our second coaching session for Monday May 8th at 9am over the phone.  Paddy Huertas      Paddy Risser

## 2017-04-12 ENCOUNTER — TRANSFERRED RECORDS (OUTPATIENT)
Dept: HEALTH INFORMATION MANAGEMENT | Facility: CLINIC | Age: 72
End: 2017-04-12

## 2017-04-12 ENCOUNTER — HOSPITAL ENCOUNTER (OUTPATIENT)
Dept: ULTRASOUND IMAGING | Facility: CLINIC | Age: 72
Discharge: HOME | End: 2017-04-12
Attending: FAMILY MEDICINE | Admitting: FAMILY MEDICINE
Payer: MEDICARE

## 2017-04-12 DIAGNOSIS — Z13.6 ENCOUNTER FOR ABDOMINAL AORTIC ANEURYSM (AAA) SCREENING: ICD-10-CM

## 2017-04-12 PROCEDURE — 76706 US ABDL AORTA SCREEN AAA: CPT

## 2017-05-08 ENCOUNTER — VIRTUAL VISIT (OUTPATIENT)
Dept: NURSING | Facility: CLINIC | Age: 72
End: 2017-05-08

## 2017-05-08 PROCEDURE — 99207 ZZC HEALTH COACHING, NO CHARGE: CPT

## 2017-05-08 NOTE — PROGRESS NOTES
May 8, 2017    [facility]  [address] [facility] [id number] [phone]  Health Coaching Progress Note    Patient Name: Kushal Bush Date: May 8, 2017      Session Length: 30      DATA    PRM Master Survey Scores Reviewed: Yes    Core Healthy Days Survey:         EDWIN Score (Last Two) 9/14/2011 3/22/2017   EDWIN Raw Score 47 36   Activation Score 77.5 75.5   EDWIN Level 4 4       PHQ-2 Score 7/6/2016 2/9/2016   PHQ-2 Total Score Interpretation - Positive if 3 or more points; Administer PHQ-9 if positive 0 0       No flowsheet data found.    Treatment Objective(s) Addressed in This Session:  Target Behavior(s): diet/weight loss and disease management/lifestyle changes of working to better manage his diabetes by making some changes to his diet-adding more vegetables to most meals throughout the day, being more aware of diet choices-carb intake and portion sizes, eating healthy while on the road-using the carb counting guide/referencing how many grams of carb are in a lot of the items we eat each day/trying to follow the recommended carb guidelines for men, being active, and accessing resources.     Current Stressors / Issues:  Upcoming travel this month-hard to always eat healthy when on the road, doesn't ever want to be in assisted living/nursing home, wants to live a long life, is drawn toward a lot of high carb foods (really likes corn), feels his diet hasn't been the greatest lately-needs to eat more veggies, being more aware of dietary choices, finding misplaced carb guide.     What Patient Does Well:   1) Patient is always interested in learning more about health/being healthy  Previous Successes:   1) Recent A1C dropped down to 8.1 from 9.2!  Areas in Need of Improvement:   1) Diet  2) Diabetes Management  3) Activity Level  Barriers to Change:   1) Patient likes to travel a lot  2) Patient feels he does everything  correctly  3) Not wanting to change  Reasons for Change:   1) Better health/live long life  2) Manage Diabetes-recent A1C improved!  Plan/Goal for the Next 4 Weeks:  GOAL #1: Try to add more vegetables to your diet each day (use sheet for veggies that don't affect blood sugar)  GOAL #1 Progress Toward Goal: 100% Maintenance  GOAL #2: Pay more attention to carbohydrate intake/try using the carb guideline booklet as reference to keep meals between 60-75 grams of carbs and snacks 15-30 grams  GOAL #2 Progress Toward Goal: 25% (Trying to be more aware/needs to find carb booklet/can do better)  GOAL #3: Try to make healthy eating choices even while traveling alot this month  GOAL #3 Progress Toward Goal: 0% New Goal    Intervention:  Motivational Interviewing    MI Intervention: Co-Developed Goal: eating healthy on the road, Expressed Empathy/Understanding, Supported Autonomy, Collaboration, Evocation, Permission to raise concern or advise, Open-ended questions, Reflections: simple and complex and Change talk (evoked)     Change Talk Expressed by the Patient: Ability to change Reasons to change Need to change Committment to change Activation Taking steps    Provider Response to Change Talk: E - Evoked more info from patient about behavior change, A - Affirmed patient's thoughts, decisions, or attempts at behavior change, R - Reflected patient's change talk and S - Summarized patient's change talk statements    Assessment / Progress on Treatment Objective(s) / Homework:    Minimal progress - PREPARATION (Decided to change - considering how); Intervened by negotiating a change plan and determining options / strategies for behavior change, identifying triggers, exploring social supports, and working towards setting a date to begin behavior change  New Objective established this session - PREPARATION (Decided to change - considering how); Intervened by negotiating a change plan and determining options / strategies for  behavior change, identifying triggers, exploring social supports, and working towards setting a date to begin behavior change  Satisfactory progress - ACTION (Actively working towards change); Intervened by reinforcing change plan / affirming steps taken         Plan: (Homework, other):  Patient was encouraged to continue to seek condition-related information and education, as well as schedule a follow up appointment with the Health  in 5 weeks. Patient has set self-identified goals and will monitor progress until the next appointment.  Scheduled our next coaching call for Monday June 12th at 9am.      Paddy Huertas

## 2017-05-08 NOTE — MR AVS SNAPSHOT
After Visit Summary   5/8/2017    Kushal Bush    MRN: [id number]           Patient Information     Date Of Birth          1945        Visit Information        Provider Department      5/8/2017 9:00 AM [facility] [address]        Today's Diagnoses     Uncontrolled type 2 diabetes mellitus without complication, without long-term current use of insulin (H)    -  1       Follow-ups after your visit        Your next 10 appointments already scheduled     Jun 12, 2017  9:00 AM CDT   Telephone Visit with [address] ([facility])    [address] [place] [id number] [phone]           Note: this is not an onsite visit; there is no need to come to the facility.              Who to contact     If you have questions or need follow up information about today's clinic visit or your schedule please contact [facility] directly at [phone].  Normal or non-critical lab and imaging results will be communicated to you by CSL DualComhart, letter or phone within 4 business days after the clinic has received the results. If you do not hear from us within 7 days, please contact the clinic through Paxerat or phone. If you have a critical or abnormal lab result, we will notify you by phone as soon as possible.  Submit refill requests through NCLC or call your pharmacy and they will forward the refill request to us. Please allow 3 business days for your refill to be completed.          Additional Information About Your Visit        MyChart Information     NCLC gives you secure access to your electronic health record. If you see a primary care provider, you can also send messages to your care team and make appointments. If you have questions, please call your primary care clinic.  If you do not have a primary care provider, please call [phone] and they will assist you.         Care EveryWhere ID     This is your Care EveryWhere ID. This could be used by other organizations to access your [facility] medical records  [id number]         Blood Pressure from Last 3 Encounters:   03/20/17 110/59   02/21/17 130/72   12/19/16 112/70    Weight from Last 3 Encounters:   03/20/17 172 lb 9.6 oz (78.3 kg)   02/21/17 175 lb 11.2 oz (79.7 kg)   12/19/16 173 lb (78.5 kg)              Today, you had the following     No orders found for display         Today's Medication Changes          These changes are accurate as of: 5/8/17 10:02 AM.  If you have any questions, ask your nurse or doctor.               These medicines have changed or have updated prescriptions.        Dose/Directions    metoprolol 25 MG 24 hr tablet   Commonly known as:  TOPROL-XL   This may have changed:  when to take this   Used for:  Atypical chest pain, Essential hypertension with goal blood pressure less than 140/90        Dose:  12.5 mg   Take 0.5 tablets (12.5 mg) by mouth 2 times daily   Quantity:  90 tablet   Refills:  1                Primary Care Provider Office Phone # Fax #    Albert Huggins [phone]       [address] [address]        Thank you!     Thank you for choosing [facility]  for your care. Our goal is always to provide you with excellent care. Hearing back from our patients is one way we can continue to improve our services. Please take a few minutes to complete the written survey that you may receive in the mail after your visit with us. Thank you!             Your Updated Medication List - Protect others around you: Learn how to safely use, store and throw away your medicines at www.disposemymeds.org.          This list is accurate as of: 5/8/17 10:02 AM.  Always use your most recent med list.                   Brand Name Dispense Instructions for use    aspirin 81 MG tablet     30 tablet    Take 1 tablet (81 mg) by mouth  daily       blood glucose monitoring test strip    TRUETRACK TEST    100 each    Use to test blood sugar 2 times daily or as directed.  Ok to substitute alternative if insurance prefers.       doxazosin 4 MG tablet    CARDURA    90 tablet    Take 1 tablet (4 mg) by mouth At Bedtime       glipiZIDE 10 MG tablet    GLUCOTROL    180 tablet    Take 1 tablet (10 mg) by mouth 2 times daily (before meals)       lisinopril 2.5 MG tablet    PRINIVIL/Zestril    180 tablet    Take 1 tablet (2.5 mg) by mouth 2 times daily       metoprolol 25 MG 24 hr tablet    TOPROL-XL    90 tablet    Take 0.5 tablets (12.5 mg) by mouth 2 times daily       pravastatin 20 MG tablet    PRAVACHOL    90 tablet    Take 1 tablet (20 mg) by mouth daily       tamsulosin 0.4 MG capsule    FLOMAX    90 capsule    Take 1 capsule (0.4 mg) by mouth daily       terbinafine 250 MG tablet    lamISIL    90 tablet    Take 1 tablet (250 mg) by mouth daily

## 2017-06-12 ENCOUNTER — TELEPHONE (OUTPATIENT)
Dept: NURSING | Facility: CLINIC | Age: 72
End: 2017-06-12

## 2017-06-20 ENCOUNTER — TELEPHONE (OUTPATIENT)
Dept: NURSING | Facility: CLINIC | Age: 72
End: 2017-06-20

## 2017-06-30 ENCOUNTER — TELEPHONE (OUTPATIENT)
Dept: NURSING | Facility: CLINIC | Age: 72
End: 2017-06-30

## 2017-08-02 ENCOUNTER — TELEPHONE (OUTPATIENT)
Dept: NURSING | Facility: CLINIC | Age: 72
End: 2017-08-02

## 2017-08-18 ENCOUNTER — TELEPHONE (OUTPATIENT)
Dept: FAMILY MEDICINE | Facility: CLINIC | Age: 72
End: 2017-08-18

## 2017-08-18 DIAGNOSIS — E78.5 HYPERLIPIDEMIA LDL GOAL <100: ICD-10-CM

## 2017-08-18 RX ORDER — PRAVASTATIN SODIUM 20 MG
TABLET ORAL
Qty: 90 TABLET | Refills: 3 | Status: SHIPPED | OUTPATIENT
Start: 2017-08-18 | End: 2017-10-10

## 2017-08-18 NOTE — TELEPHONE ENCOUNTER
pravastatin (PRAVACHOL) 20 MG tablet     Last Written Prescription Date: 1/16/17  Last Fill Quantity: 90, # refills: 1  Last Office Visit with G, [facility] or [facility] prescribing provider: 3/20/2017       Lab Results   Component Value Date    CHOL 158 01/14/2013     Lab Results   Component Value Date    HDL 37 01/14/2013     Lab Results   Component Value Date    LDL 79 07/06/2016    LDL 89 01/14/2013     Lab Results   Component Value Date    TRIG 160 01/14/2013     Lab Results   Component Value Date    CHOLHDLRATIO 4.3 01/14/2013     EMMETT Castle  August 18, 2017  1:55 PM

## 2017-08-18 NOTE — TELEPHONE ENCOUNTER
Routing refill request to provider for review/approval because:  Labs not current:  Lipids  Luis Yost RN, BSN

## 2017-08-18 NOTE — TELEPHONE ENCOUNTER
Panel Management Review      Patient has the following on his problem list:     Asthma review     ACT Total Scores 11/21/2016   ACT TOTAL SCORE -   ASTHMA ER VISITS -   ASTHMA HOSPITALIZATIONS -   ACT TOTAL SCORE (Goal Greater than or Equal to 20) 24   In the past 12 months, how many times did you visit the emergency room for your asthma without being admitted to the hospital? 0   In the past 12 months, how many times were you hospitalized overnight because of your asthma? 0      1. Is Asthma diagnosis on the Problem List? Yes    2. Is Asthma listed on Health Maintenance? Yes    3. Patient is due for:  ACT    Diabetes    ASA: Passed    Last A1C  Lab Results   Component Value Date    A1C 8.1 03/20/2017    A1C 9.2 11/21/2016    A1C 9.7 07/06/2016    A1C 8.1 11/30/2015    A1C 13.9 04/08/2015     A1C tested: FAILED    Last LDL:    Lab Results   Component Value Date    CHOL 158 01/14/2013     Lab Results   Component Value Date    HDL 37 01/14/2013     Lab Results   Component Value Date    LDL 79 07/06/2016    LDL 89 01/14/2013     Lab Results   Component Value Date    TRIG 160 01/14/2013     Lab Results   Component Value Date    CHOLHDLRATIO 4.3 01/14/2013     No results found for: NHDL    Is the patient on a Statin? YES             Is the patient on Aspirin? YES    Medications     HMG CoA Reductase Inhibitors    pravastatin (PRAVACHOL) 20 MG tablet    Salicylates    aspirin 81 MG tablet          Last three blood pressure readings:  BP Readings from Last 3 Encounters:   03/20/17 110/59   02/21/17 130/72   12/19/16 112/70       Date of last diabetes office visit: 3/20/2017     Tobacco History:     History   Smoking Status     Never Smoker   Smokeless Tobacco     Never Used               Composite cancer screening  Chart review shows that this patient is due/due soon for the following None  Summary:    Patient is due/failing the following:   Diabetes and ACT    Action needed:   Patient needs to do ACT. and  Diabetes    Type of outreach:    Phone, spoke to patient.   Call back to schedule    Questions for provider review:    None                                                                                                                                    Suzette Sy MA       Closed

## 2017-09-12 DIAGNOSIS — N40.0 BENIGN NON-NODULAR PROSTATIC HYPERPLASIA WITHOUT LOWER URINARY TRACT SYMPTOMS: ICD-10-CM

## 2017-09-12 DIAGNOSIS — I10 ESSENTIAL HYPERTENSION WITH GOAL BLOOD PRESSURE LESS THAN 140/90: ICD-10-CM

## 2017-09-13 NOTE — TELEPHONE ENCOUNTER
doxazosin (CARDURA) 4 MG tablet    Last Written Prescription Date: 01/16/2017  Last Fill Quantity: 90,05/05/2017 # refills: 1    Last Office Visit with G, [facility] or [facility] prescribing provider:  03/20/2017-Dr Huggins   Future Office Visit:        BP Readings from Last 3 Encounters:   03/20/17 110/59   02/21/17 130/72   12/19/16 112/70

## 2017-09-14 ENCOUNTER — TELEPHONE (OUTPATIENT)
Dept: FAMILY MEDICINE | Facility: CLINIC | Age: 72
End: 2017-09-14

## 2017-09-14 NOTE — LETTER
[facility]  [address] [facility] [id number] [phone]  September 22, 2017    Kushal Bush  [address]  [facility] [address]    Dear Kushal,    I care about your health and have reviewed your health plan. I have reviewed your medical conditions, medication list, and lab results and am making recommendations based on this review, to better manage your health.    You are in particular need of attention regarding:  -Asthma    I am recommending that you:  {recommendations: -Complete and return the attached ASTHMA CONTROL TEST.  If your total score is 19 or less or you have been to the ER or urgent care for your asthma, then please schedule an asthma followup appointment.    Here is a list of Health Maintenance topics that are due now or due soon:  Health Maintenance Due   Topic Date Due     MEDICARE ANNUAL WELLNESS VISIT  04/15/2016     ASTHMA CONTROL TEST Q6 MOS  05/21/2017     A1C Q3 MO  06/20/2017     FALL RISK ASSESSMENT  07/06/2017     LIPID MONITORING Q1 YEAR  07/06/2017     FOOT EXAM Q1 YEAR  07/18/2017     MICROALBUMIN Q1 YEAR  07/18/2017     INFLUENZA VACCINE (SYSTEM ASSIGNED)  09/01/2017       Please call us at [phone] (or use [employer]) to address the above recommendations.     Thank you for trusting [facility] and we appreciate the opportunity to serve you.  We look forward to supporting your healthcare needs in the future.    Healthy Regards,    Albert Huggins MD

## 2017-09-14 NOTE — TELEPHONE ENCOUNTER
Panel Management Review      Patient has the following on his problem list:     Asthma review     ACT Total Scores 11/21/2016   ACT TOTAL SCORE -   ASTHMA ER VISITS -   ASTHMA HOSPITALIZATIONS -   ACT TOTAL SCORE (Goal Greater than or Equal to 20) 24   In the past 12 months, how many times did you visit the emergency room for your asthma without being admitted to the hospital? 0   In the past 12 months, how many times were you hospitalized overnight because of your asthma? 0      1. Is Asthma diagnosis on the Problem List? Yes    2. Is Asthma listed on Health Maintenance? Yes    3. Patient is due for:  ACT    Diabetes    ASA: Passed    Last A1C  Lab Results   Component Value Date    A1C 8.1 03/20/2017    A1C 9.2 11/21/2016    A1C 9.7 07/06/2016    A1C 8.1 11/30/2015    A1C 13.9 04/08/2015     A1C tested: FAILED    Last LDL:    Lab Results   Component Value Date    CHOL 158 01/14/2013     Lab Results   Component Value Date    HDL 37 01/14/2013     Lab Results   Component Value Date    LDL 79 07/06/2016    LDL 89 01/14/2013     Lab Results   Component Value Date    TRIG 160 01/14/2013     Lab Results   Component Value Date    CHOLHDLRATIO 4.3 01/14/2013     No results found for: NHDL    Is the patient on a Statin? YES             Is the patient on Aspirin? YES    Medications     HMG CoA Reductase Inhibitors    pravastatin (PRAVACHOL) 20 MG tablet    Salicylates    aspirin 81 MG tablet          Last three blood pressure readings:  BP Readings from Last 3 Encounters:   03/20/17 110/59   02/21/17 130/72   12/19/16 112/70       Date of last diabetes office visit: 3/20/17     Tobacco History:     History   Smoking Status     Never Smoker   Smokeless Tobacco     Never Used         Hypertension   Last three blood pressure readings:  BP Readings from Last 3 Encounters:   03/20/17 110/59   02/21/17 130/72   12/19/16 112/70     Blood pressure: FAILED    HTN Guidelines:  Age 18-59 BP range:  Less than 140/90  Age 60-85 with  Diabetes:  Less than 140/90  Age 60-85 without Diabetes:  less than 150/90  None            Composite cancer screening  Chart review shows that this patient is due/due soon for the following None  Summary:    Patient is due/failing the following:   PE, A1C and ACT    Action needed:   Patient needs office visit for A1C., Patient needs to do ACT. and PE    Type of outreach:    Phone, left message for patient to call back.     Questions for provider review:    None                                                                                                                                    Suzette Sy MA       Chart routed to Care Team [id number]   .

## 2017-09-15 RX ORDER — DOXAZOSIN 4 MG/1
TABLET ORAL
Qty: 30 TABLET | Refills: 0 | Status: SHIPPED | OUTPATIENT
Start: 2017-09-15 | End: 2017-09-15

## 2017-09-15 NOTE — TELEPHONE ENCOUNTER
Prescription approved per [facility] Refill Protocol.  For 30 days-  LM pt needs OV     Marina Feliz RN

## 2017-10-10 ENCOUNTER — OFFICE VISIT (OUTPATIENT)
Dept: FAMILY MEDICINE | Facility: CLINIC | Age: 72
End: 2017-10-10
Payer: MEDICARE

## 2017-10-10 VITALS
HEART RATE: 62 BPM | TEMPERATURE: 97.9 F | OXYGEN SATURATION: 97 % | SYSTOLIC BLOOD PRESSURE: 104 MMHG | DIASTOLIC BLOOD PRESSURE: 61 MMHG | HEIGHT: 67 IN | WEIGHT: 171 LBS | BODY MASS INDEX: 26.84 KG/M2 | RESPIRATION RATE: 12 BRPM

## 2017-10-10 DIAGNOSIS — R07.89 ATYPICAL CHEST PAIN: ICD-10-CM

## 2017-10-10 DIAGNOSIS — B35.1 ONYCHOMYCOSIS: ICD-10-CM

## 2017-10-10 DIAGNOSIS — Z00.00 ROUTINE GENERAL MEDICAL EXAMINATION AT A HEALTH CARE FACILITY: Primary | ICD-10-CM

## 2017-10-10 DIAGNOSIS — Z23 NEED FOR PROPHYLACTIC VACCINATION AND INOCULATION AGAINST INFLUENZA: ICD-10-CM

## 2017-10-10 DIAGNOSIS — E78.5 HYPERLIPIDEMIA LDL GOAL <100: ICD-10-CM

## 2017-10-10 DIAGNOSIS — Z12.5 SPECIAL SCREENING FOR MALIGNANT NEOPLASM OF PROSTATE: ICD-10-CM

## 2017-10-10 DIAGNOSIS — N40.0 BENIGN NON-NODULAR PROSTATIC HYPERPLASIA WITHOUT LOWER URINARY TRACT SYMPTOMS: ICD-10-CM

## 2017-10-10 DIAGNOSIS — I10 ESSENTIAL HYPERTENSION WITH GOAL BLOOD PRESSURE LESS THAN 140/90: ICD-10-CM

## 2017-10-10 DIAGNOSIS — L82.1 SEBORRHEIC KERATOSIS: ICD-10-CM

## 2017-10-10 LAB — HBA1C MFR BLD: 8.3 % (ref 4.3–6)

## 2017-10-10 PROCEDURE — G0439 PPPS, SUBSEQ VISIT: HCPCS | Performed by: FAMILY MEDICINE

## 2017-10-10 PROCEDURE — 99207 C FOOT EXAM  NO CHARGE: CPT | Performed by: FAMILY MEDICINE

## 2017-10-10 PROCEDURE — G0103 PSA SCREENING: HCPCS | Performed by: FAMILY MEDICINE

## 2017-10-10 PROCEDURE — G0008 ADMIN INFLUENZA VIRUS VAC: HCPCS | Performed by: FAMILY MEDICINE

## 2017-10-10 PROCEDURE — 90662 IIV NO PRSV INCREASED AG IM: CPT | Performed by: FAMILY MEDICINE

## 2017-10-10 PROCEDURE — 80053 COMPREHEN METABOLIC PANEL: CPT | Performed by: FAMILY MEDICINE

## 2017-10-10 PROCEDURE — 83036 HEMOGLOBIN GLYCOSYLATED A1C: CPT | Performed by: FAMILY MEDICINE

## 2017-10-10 PROCEDURE — 80061 LIPID PANEL: CPT | Performed by: FAMILY MEDICINE

## 2017-10-10 PROCEDURE — 82043 UR ALBUMIN QUANTITATIVE: CPT | Performed by: FAMILY MEDICINE

## 2017-10-10 PROCEDURE — 36415 COLL VENOUS BLD VENIPUNCTURE: CPT | Performed by: FAMILY MEDICINE

## 2017-10-10 PROCEDURE — 99213 OFFICE O/P EST LOW 20 MIN: CPT | Mod: 25 | Performed by: FAMILY MEDICINE

## 2017-10-10 RX ORDER — LISINOPRIL 5 MG/1
5 TABLET ORAL DAILY
Qty: 90 TABLET | Refills: 1 | Status: SHIPPED | OUTPATIENT
Start: 2017-10-10 | End: 2018-11-05

## 2017-10-10 RX ORDER — PRAVASTATIN SODIUM 20 MG
TABLET ORAL
Qty: 90 TABLET | Refills: 3 | Status: SHIPPED | OUTPATIENT
Start: 2017-10-10 | End: 2018-11-05

## 2017-10-10 RX ORDER — GLIPIZIDE 10 MG/1
10 TABLET ORAL
Qty: 180 TABLET | Refills: 1 | Status: SHIPPED | OUTPATIENT
Start: 2017-10-10 | End: 2018-11-05

## 2017-10-10 RX ORDER — TAMSULOSIN HYDROCHLORIDE 0.4 MG/1
0.4 CAPSULE ORAL DAILY
Qty: 90 CAPSULE | Refills: 1 | Status: SHIPPED | OUTPATIENT
Start: 2017-10-10 | End: 2018-05-02 | Stop reason: DRUGHIGH

## 2017-10-10 RX ORDER — DOXAZOSIN 4 MG/1
4 TABLET ORAL AT BEDTIME
Qty: 90 TABLET | Refills: 1 | Status: SHIPPED | OUTPATIENT
Start: 2017-10-10 | End: 2019-12-31

## 2017-10-10 RX ORDER — TAMSULOSIN HYDROCHLORIDE 0.4 MG/1
0.8 CAPSULE ORAL DAILY
Qty: 180 CAPSULE | Refills: 1 | Status: SHIPPED | OUTPATIENT
Start: 2017-10-10 | End: 2018-05-01

## 2017-10-10 RX ORDER — METOPROLOL TARTRATE 25 MG/1
12.5 TABLET, FILM COATED ORAL DAILY
Qty: 45 TABLET | Refills: 1 | Status: SHIPPED | OUTPATIENT
Start: 2017-10-10 | End: 2018-08-06

## 2017-10-10 NOTE — PROGRESS NOTES
SUBJECTIVE:   CC: Kushal Bush is an 71 year old male who presents for preventative health visit.     Routine general medical examination at a health care facility  (primary encounter diagnosis)  Physical   Annual:     Getting at least 3 servings of Calcium per day::  Yes    Bi-annual eye exam::  Yes    Dental care twice a year::  Yes    Sleep apnea or symptoms of sleep apnea::  Daytime drowsiness    Diet::  Diabetic    Frequency of exercise::  6-7 days/week    Duration of exercise::  45-60 minutes    Taking medications regularly::  Yes    Medication side effects::  Other    Additional concerns today::  YES    COGNITIVE SCREEN  1) Repeat 3 items (Banana, Sunrise, Chair)    2) Clock draw: NORMAL  3) 3 item recall: Recalls 2 objects   Results: 3 items recalled: COGNITIVE IMPAIRMENT LESS LIKELY    Mini-CogTM Copyright S Vaishali. Licensed by the author for use in [employer]; reprinted with permission (radha@.[email or web address]). All rights reserved.      Uncontrolled type 2 diabetes mellitus without complication, without long-term current use of insulin (H): Patient concerned about elevated blood glucose levels, was wondering about average.  To be assesed    Need for prophylactic vaccination and inoculation against influenza offered declined  Benign non-nodular prostatic hyperplasia without lower urinary tract symptoms: Patient says he is up at least once per night to go to the bathroom and has trouble urinating when he does.wonders when he will have retention     Seborrheic keratosis: The patient noticed a bump on his upper chest, says it isn't growing.     Special screening for malignant neoplasm of prostate:if referred    Hyperlipidemia LDL goal <100  Recent Labs   Lab Test  07/06/16   1707  06/09/14   1209  01/14/13   0850  01/05/12   0827   CHOL   --    --   158  143   HDL   --    --   37*  36*   LDL  79  126  89  84   TRIG   --    --   160*  113   CHOLHDLRATIO   --    --   4.3  4.0     Essential hypertension  with goal blood pressure less than 140/90  BP Readings from Last 6 Encounters:   10/10/17 104/61   03/20/17 110/59   02/21/17 130/72   12/19/16 112/70   11/29/16 138/80   11/21/16 134/71       Atypical chest pain: needs refills no sx    Onychomycosis: Patient says his toes are getting better.      Reviewed and updated as needed this visit by clinical staff       Past Medical History:   Diagnosis Date     Asthma      Benign non-nodular prostatic hyperplasia without lower urinary tract symptoms 7/18/2016     BPH (benign prostatic hyperplasia) 9/14/2011     Campylobacter diarrhea 7/18/2016     CMC arthritis, thumb, degenerative 11/25/2013     Diabetes type 2, uncontrolled (H) 7/18/2016     Essential hypertension with goal blood pressure less than 140/90 7/18/2016     Fatigue, unspecified type 7/18/2016     Heart palpitations 9/14/2011     HTN (hypertension) 9/14/2011     HTN, goal below 140/90 1/4/2012     Hyperglycemia 10/27/2011     Hyperlipidemia LDL goal <100 10/27/2011     Impacted cerumen 9/14/2011     Intermittent asthma 9/14/2011     Lumbar radiculopathy 11/7/2011     Post-nasal drainage 9/14/2011     Presbycusis 9/14/2011     Seborrheic keratoses 9/14/2011     Tubular adenoma of colon      Type 2 diabetes mellitus without complications (H) 10/12/2015     Type 2 diabetes, HbA1c goal < 7% (H) 1/6/2012     Type II diabetes mellitus with HbA1C goal between 7 and 8 6/18/2013     Uncontrolled type 2 diabetes mellitus without complication, without long-term current use of insulin (H) 12/27/2016       Past Surgical History:   Procedure Laterality Date     COLONOSCOPY  1/29/14     COLONOSCOPY  1/29/2014    Procedure: COMBINED COLONOSCOPY, SINGLE BIOPSY/POLYPECTOMY BY BIOPSY;  COLONOSCOPY two polyps with jumbo forceps;  Surgeon: Alina Hopkins MD;  Location:  GI     EYE SURGERY Right 12/14/16    cataract removal      ingrown toenail         Family History   Problem Relation Age of Onset     CANCER Mother       age 89, uterine/breast     HEART DISEASE Father      51       Social History   Substance Use Topics     Smoking status: Never Smoker     Smokeless tobacco: Never Used     Alcohol use Yes      Comment: occas.         Reviewed and updated as needed this visit by Provider      Today's PHQ-2 Score:   PHQ-2 ( 1999 [employer]) 10/10/2017 7/6/2016   Q1: Little interest or pleasure in doing things 0 0   Q2: Feeling down, depressed or hopeless 0 0   PHQ-2 Score 0 0   Q1: Little interest or pleasure in doing things Not at all -   Q2: Feeling down, depressed or hopeless Not at all -   PHQ-2 Score 0 -         Do you feel safe in your environment - Yes    Do you have a Health Care Directive?: Yes: Advance Directive has been received and scanned.    Current providers sharing in care for this patient include: Patient Care Team:  Albert Huggins MD as PCP - General (Family Practice)      Hearing impairment: Yes, Difficulty following a conversation in a noisy restaurant or crowded room.    Difficulty following dialogue in the theater.    Difficulty understanding soft or whispered speech.    Ability to successfully perform activities of daily living: Yes, no assistance needed     Fall risk:  Fallen 2 or more times in the past year?: No  Any fall with injury in the past year?: No      Home safety:  none identified  click delete button to remove this line now    The following health maintenance items are reviewed in Epic and correct as of today:Health Maintenance   Topic Date Due     MEDICARE ANNUAL WELLNESS VISIT  04/15/2016     ASTHMA CONTROL TEST Q6 MOS  05/21/2017     A1C Q3 MO  06/20/2017     FALL RISK ASSESSMENT  07/06/2017     LIPID MONITORING Q1 YEAR  07/06/2017     FOOT EXAM Q1 YEAR  07/18/2017     MICROALBUMIN Q1 YEAR  07/18/2017     INFLUENZA VACCINE (SYSTEM ASSIGNED)  09/01/2017     ASTHMA ACTION PLAN Q1 YR  11/21/2017     CREATININE Q1 YEAR  03/20/2018     EYE EXAM Q1 YEAR  04/12/2018     TSH W/ FREE T4 REFLEX Q2 YEAR   "07/06/2018     COLONOSCOPY Q5 YR  01/29/2019     ADVANCE DIRECTIVE PLANNING Q5 YRS  01/21/2020     TETANUS IMMUNIZATION (SYSTEM ASSIGNED)  01/11/2023     PNEUMOCOCCAL  Completed     AORTIC ANEURYSM SCREENING (SYSTEM ASSIGNED)  Completed             ROS:    E: NEGATIVE for vision changes or irritation  E/M: NEGATIVE for ear, mouth and throat problems  R: NEGATIVE for significant cough or SOB  CV: NEGATIVE for chest pain  GI: NEGATIVE for change in bowel habits  M: NEGATIVE for significant arthralgias or myalgia  N: NEGATIVE for weakness, dizziness or paresthesias      OBJECTIVE:   There were no vitals taken for this visit. Estimated body mass index is 26.24 kg/(m^2) as calculated from the following:    Height as of 3/20/17: 5' 8\" (1.727 m).    Weight as of 3/20/17: 172 lb 9.6 oz (78.3 kg).    /61 (BP Location: Right arm, Patient Position: Chair, Cuff Size: Adult Regular)  Pulse 62  Temp 97.9  F (36.6  C) (Oral)  Resp 12  Ht 5' 7\" (1.702 m)  Wt 171 lb (77.6 kg)  SpO2 97%  BMI 26.78 kg/m2    EXAM:   GENERAL: healthy, alert and no distress  EYES: Eyes grossly normal to inspection, PERRL and conjunctivae and sclerae normal  HENT: ear canals and TM's normal, nose and mouth without ulcers or lesions  NECK: no adenopathy, no asymmetry, masses, or scars and thyroid normal to palpation  RESP: lungs clear to auscultation - no rales, rhonchi or wheezes  CV: regular rate and rhythm, normal S1 S2, no S3 or S4, no murmur, click or rub, no peripheral edema and peripheral pulses strong  ABDOMEN: soft, nontender, no hepatosplenomegaly, no masses and bowel sounds normal  MS: no gross musculoskeletal defects noted, no edema  FOOT EXAM: Feet are warm and dry No LE edema. Skin intact. Onychomycosis  Almost resolved distal linear  SKIN: See additional note    ASSESSMENT / PLAN:   ASSESSMENT / PLAN:  (Z00.00) Routine general medical examination at a health care facility  (primary encounter diagnosis)  completed    (E11.65) " Uncontrolled type 2 diabetes mellitus without complication, without long-term current use of insulin (H)  Comment: resistant to therapy period  Lab Results   Component Value Date    A1C 8.3 10/10/2017    A1C 8.1 03/20/2017    A1C 9.2 11/21/2016    A1C 9.7 07/06/2016    A1C 8.1 11/30/2015   Will explore options    Plan: Hemoglobin A1c, Lipid panel reflex to direct         LDL, Albumin Random Urine Quantitative with         Creat Ratio, FOOT EXAM, glipiZIDE (GLUCOTROL)         10 MG tablet, blood glucose monitoring         (TRUETRACK TEST) test strip    (Z23) Need for prophylactic vaccination and inoculation against influenza  Comment: received  Plan: FLU VACCINE, INCREASED ANTIGEN, PRESV FREE, AGE        65+ [[id number]]            (N40.0) Benign non-nodular prostatic hyperplasia without lower urinary tract symptoms  Comment: referred interested in laser  Plan: tamsulosin (FLOMAX) 0.4 MG capsule, UROLOGY         ADULT REFERRAL, tamsulosin (FLOMAX) 0.4 MG         capsule, doxazosin (CARDURA) 4 MG tablet,         glipiZIDE (GLUCOTROL) 10 MG tablet              (Z12.5) Special screening for malignant neoplasm of prostate  Comment: [facility] database discussed  Plan: PSA, screen            (E78.5) Hyperlipidemia LDL goal <100  Comment: refill  Plan: Lipid panel reflex to direct LDL, pravastatin         (PRAVACHOL) 20 MG tablet, glipiZIDE (GLUCOTROL)        10 MG tablet            (I10) Essential hypertension with goal blood pressure less than 140/90  Comment:controlled  refill  Plan: Comprehensive metabolic panel, doxazosin         (CARDURA) 4 MG tablet, glipiZIDE (GLUCOTROL) 10        MG tablet, lisinopril (PRINIVIL/ZESTRIL) 5 MG         tablet, metoprolol (LOPRESSOR) 25 MG tablet              (B35.1) Onychomycosis  Comment:debbi history therapeutic rate discussed    RTC twice yearly    End of Life Planning:  Patient currently has an advanced directive    COUNSELING:  Reviewed preventive health counseling, as reflected in  "patient instructions        Estimated body mass index is 26.24 kg/(m^2) as calculated from the following:    Height as of 3/20/17: 5' 8\" (1.727 m).    Weight as of 3/20/17: 172 lb 9.6 oz (78.3 kg).     reports that he has never smoked. He has never used smokeless tobacco.        Appropriate preventive services were discussed with this patient, including applicable screening as appropriate for cardiovascular disease, diabetes, osteopenia/osteoporosis, and glaucoma.  As appropriate for age/gender, discussed screening for colorectal cancer, prostate cancer, breast cancer, and cervical cancer. Checklist reviewing preventive services available has been given to the patient.    Reviewed patients plan of care and provided an AVS. The Basic Care Plan (routine screening as documented in Health Maintenance) for Kushal meets the Care Plan requirement. This Care Plan has been established and reviewed with the Patient.    Counseling Resources:  ATP IV Guidelines  Pooled Cohorts Equation Calculator  Breast Cancer Risk Calculator  FRAX Risk Assessment  ICSI Preventive Guidelines  Dietary Guidelines for Americans, 2010  USDA's MyPlate  ASA Prophylaxis  Lung CA Screening    Albert Huggins MD  [facility] Influenza Immunization Documentation    1.  Is the person to be vaccinated sick today?   No    2. Does the person to be vaccinated have an allergy to a component   of the vaccine?   No    3. Has the person to be vaccinated ever had a serious reaction   to influenza vaccine in the past?   No    4. Has the person to be vaccinated ever had Guillain-Barré syndrome?   No    Form completed by Bebe Lawson CMA           "

## 2017-10-10 NOTE — NURSING NOTE
"Chief Complaint   Patient presents with     Physical     Recheck Medication     wants to double flomax     Derm Problem     spot on upper chest to look at     Urinary Problem     at times with a full bladder hard to urinate-prostate?       Initial /61 (BP Location: Right arm, Patient Position: Chair, Cuff Size: Adult Regular)  Pulse 62  Temp 97.9  F (36.6  C) (Oral)  Resp 12  Ht 5' 7\" (1.702 m)  Wt 171 lb (77.6 kg)  SpO2 97%  BMI 26.78 kg/m2 Estimated body mass index is 26.78 kg/(m^2) as calculated from the following:    Height as of this encounter: 5' 7\" (1.702 m).    Weight as of this encounter: 171 lb (77.6 kg).  Medication Reconciliation: complete   Bebe Lawson, SRAVANI      "

## 2017-10-10 NOTE — MR AVS SNAPSHOT
After Visit Summary   10/10/2017    Kushal Bush    MRN: [id number]           Patient Information     Date Of Birth          1945        Visit Information        Provider Department      10/10/2017 3:00 PM Albert Huggins MD [facility]        Today's Diagnoses     Routine general medical examination at a health care facility    -  1    Need for prophylactic vaccination and inoculation against influenza        Benign non-nodular prostatic hyperplasia without lower urinary tract symptoms        Seborrheic keratosis        Special screening for malignant neoplasm of prostate        Hyperlipidemia LDL goal <100        Essential hypertension with goal blood pressure less than 140/90        Uncontrolled type 2 diabetes mellitus without complication, without long-term current use of insulin (H)        Atypical chest pain        Onychomycosis          Care Instructions      Preventive Health Recommendations:   Male Ages 65 and over    Yearly exam:             See your health care provider every year in order to  o   Review health changes.   o   Discuss preventive care.    o   Review your medicines if your doctor has prescribed any.    Talk with your health care provider about whether you should have a test to screen for prostate cancer (PSA).    Every 3 years, have a diabetes test (fasting glucose). If you are at risk for diabetes, you should have this test more often.    Every 5 years, have a cholesterol test. Have this test more often if you are at risk for high cholesterol or heart disease.     Every 10 years, have a colonoscopy. Or, have a yearly FIT test (stool test). These exams will check for colon cancer.    Talk to with your health care provider about screening for Abdominal Aortic Aneurysm if you have a family history of AAA or have a history of smoking.    Shots:     Get a flu shot each year.     Get a tetanus shot every 10 years.     Talk to your doctor about your  pneumonia vaccines. There are now two you should receive - Pneumovax (PPSV 23) and Prevnar (PCV 13).     Talk to your doctor about a shingles vaccine.     Talk to your doctor about the hepatitis B vaccine.  Nutrition:     Eat at least 5 servings of fruits and vegetables each day.     Eat whole-grain bread, whole-wheat pasta and brown rice instead of white grains and rice.     Talk to your provider about Calcium and Vitamin D.   Lifestyle    Exercise for at least 150 minutes a week (30 minutes a day, 5 days a week). This will help you control your weight and prevent disease.     Limit alcohol to one drink per day.     No smoking.     Wear sunscreen to prevent skin cancer.     See your dentist every six months for an exam and cleaning.     See your eye doctor every 1 to 2 years to screen for conditions such as glaucoma, macular degeneration, cataracts, etc   Preventive Health Recommendations:   Male Ages 65 and over    Yearly exam:             See your health care provider every year in order to  o   Review health changes.   o   Discuss preventive care.    o   Review your medicines if your doctor has prescribed any.  Talk with your health care provider about whether you should have a test to screen for prostate cancer (PSA).  Every 3 years, have a diabetes test (fasting glucose). If you are at risk for diabetes, you should have this test more often.  Every 5 years, have a cholesterol test. Have this test more often if you are at risk for high cholesterol or heart disease.   Every 10 years, have a colonoscopy. Or, have a yearly FIT test (stool test). These exams will check for colon cancer.  Talk to with your health care provider about screening for Abdominal Aortic Aneurysm if you have a family history of AAA or have a history of smoking.    Shots:   Get a flu shot each year.   Get a tetanus shot every 10 years.   Talk to your doctor about your pneumonia vaccines. There are now two you should receive - Pneumovax (PPSV  23) and Prevnar (PCV 13).   Talk to your doctor about a shingles vaccine.   Talk to your doctor about the hepatitis B vaccine.  Nutrition:   Eat at least 5 servings of fruits and vegetables each day.   Eat whole-grain bread, whole-wheat pasta and brown rice instead of white grains and rice.   Talk to your provider about Calcium and Vitamin D.   Lifestyle  Exercise for at least 150 minutes a week (30 minutes a day, 5 days a week). This will help you control your weight and prevent disease.   Limit alcohol to one drink per day.   No smoking.   Wear sunscreen to prevent skin cancer.   See your dentist every six months for an exam and cleaning.   See your eye doctor every 1 to 2 years to screen for conditions such as glaucoma, macular degeneration, cataracts, etc           Follow-ups after your visit        Additional Services     UROLOGY ADULT REFERRAL       Your provider has referred you to: [place]: [facility] [phone]   [email or web address]    Please be aware that coverage of these services is subject to the terms and limitations of your health insurance plan.  Call member services at your health plan with any benefit or coverage questions.      Please bring the following with you to your appointment:    (1) Any X-Rays, CTs or MRIs which have been performed.  Contact the facility where they were done to arrange for  prior to your scheduled appointment.    (2) List of current medications  (3) This referral request   (4) Any documents/labs given to you for this referral                  Who to contact     If you have questions or need follow up information about today's clinic visit or your schedule please contact [facility] directly at [phone].  Normal or non-critical lab and imaging results will be communicated to you by MyChart, letter or phone within 4 business days after the clinic has received the results. If you do not hear from us  "within 7 days, please contact the clinic through [employer] or phone. If you have a critical or abnormal lab result, we will notify you by phone as soon as possible.  Submit refill requests through [employer] or call your pharmacy and they will forward the refill request to us. Please allow 3 business days for your refill to be completed.          Additional Information About Your Visit        [employer] Information     [employer] gives you secure access to your electronic health record. If you see a primary care provider, you can also send messages to your care team and make appointments. If you have questions, please call your primary care clinic.  If you do not have a primary care provider, please call [phone] and they will assist you.        Care EveryWhere ID     This is your Care EveryWhere ID. This could be used by other organizations to access your [facility] medical records  [id number]        Your Vitals Were     Pulse Temperature Respirations Height Pulse Oximetry BMI (Body Mass Index)    62 97.9  F (36.6  C) (Oral) 12 5' 7\" (1.702 m) 97% 26.78 kg/m2       Blood Pressure from Last 3 Encounters:   10/10/17 104/61   03/20/17 110/59   02/21/17 130/72    Weight from Last 3 Encounters:   10/10/17 171 lb (77.6 kg)   03/20/17 172 lb 9.6 oz (78.3 kg)   02/21/17 175 lb 11.2 oz (79.7 kg)              We Performed the Following     Albumin Random Urine Quantitative with Creat Ratio     Comprehensive metabolic panel     FLU VACCINE, INCREASED ANTIGEN, PRESV FREE, AGE 65+ [45135]     FOOT EXAM     Hemoglobin A1c     Lipid panel reflex to direct LDL     PSA, screen     UROLOGY ADULT REFERRAL          Today's Medication Changes          These changes are accurate as of: 10/10/17 10:40 PM.  If you have any questions, ask your nurse or doctor.               Start taking these medicines.        Dose/Directions    metoprolol 25 MG tablet   Commonly known as:  LOPRESSOR   Used for:  Atypical chest pain, Essential hypertension with " goal blood pressure less than 140/90   Replaces:  metoprolol 25 MG 24 hr tablet   Started by:  Albert Huggins MD        Dose:  12.5 mg   Take 0.5 tablets (12.5 mg) by mouth daily   Quantity:  45 tablet   Refills:  1       * tamsulosin 0.4 MG capsule   Commonly known as:  FLOMAX   Used for:  Benign non-nodular prostatic hyperplasia without lower urinary tract symptoms   Started by:  Albert Huggins MD        Dose:  0.8 mg   Take 2 capsules (0.8 mg) by mouth daily   Quantity:  180 capsule   Refills:  1       * tamsulosin 0.4 MG capsule   Commonly known as:  FLOMAX   Used for:  Benign non-nodular prostatic hyperplasia without lower urinary tract symptoms   Started by:  Albert Huggins MD        Dose:  0.4 mg   Take 1 capsule (0.4 mg) by mouth daily   Quantity:  90 capsule   Refills:  1       * Notice:  This list has 2 medication(s) that are the same as other medications prescribed for you. Read the directions carefully, and ask your doctor or other care provider to review them with you.      These medicines have changed or have updated prescriptions.        Dose/Directions    doxazosin 4 MG tablet   Commonly known as:  CARDURA   This may have changed:  See the new instructions.   Used for:  Essential hypertension with goal blood pressure less than 140/90, Benign non-nodular prostatic hyperplasia without lower urinary tract symptoms   Changed by:  Albert Huggins MD        Dose:  4 mg   Take 1 tablet (4 mg) by mouth At Bedtime   Quantity:  90 tablet   Refills:  1       lisinopril 5 MG tablet   Commonly known as:  PRINIVIL/ZESTRIL   This may have changed:    - medication strength  - how much to take  - when to take this   Used for:  Essential hypertension with goal blood pressure less than 140/90   Changed by:  Albert Huggins MD        Dose:  5 mg   Take 1 tablet (5 mg) by mouth daily   Quantity:  90 tablet   Refills:  1       pravastatin 20 MG tablet   Commonly known as:  PRAVACHOL   This may have changed:  See the  new instructions.   Used for:  Hyperlipidemia LDL goal <100   Changed by:  Albert Huggins MD        TAKE 1 TABLET(20 MG) BY MOUTH DAILY   Quantity:  90 tablet   Refills:  3         Stop taking these medicines if you haven't already. Please contact your care team if you have questions.     metoprolol 25 MG 24 hr tablet   Commonly known as:  TOPROL-XL   Replaced by:  metoprolol 25 MG tablet   Stopped by:  Albert Huggins MD                Where to get your medicines      These medications were sent to [facility] [address] [place] [address] [id number] [place] [address] AT [place] [address]  [id number] [place] [id number]    Hours:  24-hours Phone:  [phone]     aspirin 81 MG tablet    blood glucose monitoring test strip    doxazosin 4 MG tablet    glipiZIDE 10 MG tablet    lisinopril 5 MG tablet    metoprolol 25 MG tablet    pravastatin 20 MG tablet    tamsulosin 0.4 MG capsule    tamsulosin 0.4 MG capsule                Primary Care Provider Office Phone # Fax #    Albert Huggins [phone] [address] [facility] [address]        Equal Access to Services     [facility]: Hadii aad ku hadasho Soomaali, waaxda luqadaha, qaybta kaalmada adeegyada, waxay idiin hayaan adeeg fredy peoples . So [facility] [phone].    ATENCIÓN: Si habla español, tiene a chicas disposición servicios gratuitos de asistencia lingüística. [facility] [phone].    We comply with applicable federal civil rights laws and [place] laws. We do not discriminate on the basis of race, color, national origin, age, disability, sex, sexual orientation, or gender identity.            Thank you!     Thank you for choosing [facility]  for your care. Our goal is always to provide you with excellent care. Hearing back from our patients is one way we can continue to improve our services. Please take a few minutes to complete the written survey that you may receive in the mail after your  visit with us. Thank you!             Your Updated Medication List - Protect others around you: Learn how to safely use, store and throw away your medicines at www.disposemymeds.org.          This list is accurate as of: 10/10/17 10:40 PM.  Always use your most recent med list.                   Brand Name Dispense Instructions for use Diagnosis    aspirin 81 MG tablet     30 tablet    Take 1 tablet (81 mg) by mouth daily    Atypical chest pain       blood glucose monitoring test strip    TRUETRACK TEST    100 each    Use to test blood sugar 2 times daily or as directed.  Ok to substitute alternative if insurance prefers.    Uncontrolled type 2 diabetes mellitus without complication, without long-term current use of insulin (H)       doxazosin 4 MG tablet    CARDURA    90 tablet    Take 1 tablet (4 mg) by mouth At Bedtime    Essential hypertension with goal blood pressure less than 140/90, Benign non-nodular prostatic hyperplasia without lower urinary tract symptoms       glipiZIDE 10 MG tablet    GLUCOTROL    180 tablet    Take 1 tablet (10 mg) by mouth 2 times daily (before meals)    Uncontrolled type 2 diabetes mellitus without complication, without long-term current use of insulin (H), Benign non-nodular prostatic hyperplasia without lower urinary tract symptoms, Essential hypertension with goal blood pressure less than 140/90, Atypical chest pain, Hyperlipidemia LDL goal <100       lisinopril 5 MG tablet    PRINIVIL/ZESTRIL    90 tablet    Take 1 tablet (5 mg) by mouth daily    Essential hypertension with goal blood pressure less than 140/90       metoprolol 25 MG tablet    LOPRESSOR    45 tablet    Take 0.5 tablets (12.5 mg) by mouth daily    Atypical chest pain, Essential hypertension with goal blood pressure less than 140/90       pravastatin 20 MG tablet    PRAVACHOL    90 tablet    TAKE 1 TABLET(20 MG) BY MOUTH DAILY    Hyperlipidemia LDL goal <100       * tamsulosin 0.4 MG capsule    FLOMAX    180 capsule     Take 2 capsules (0.8 mg) by mouth daily    Benign non-nodular prostatic hyperplasia without lower urinary tract symptoms       * tamsulosin 0.4 MG capsule    FLOMAX    90 capsule    Take 1 capsule (0.4 mg) by mouth daily    Benign non-nodular prostatic hyperplasia without lower urinary tract symptoms       terbinafine 250 MG tablet    lamISIL    90 tablet    Take 1 tablet (250 mg) by mouth daily    Onychomycosis       * Notice:  This list has 2 medication(s) that are the same as other medications prescribed for you. Read the directions carefully, and ask your doctor or other care provider to review them with you.

## 2017-10-11 LAB
ALBUMIN SERPL-MCNC: 4 G/DL (ref 3.4–5)
ALP SERPL-CCNC: 69 U/L (ref 40–150)
ALT SERPL W P-5'-P-CCNC: 52 U/L (ref 0–70)
ANION GAP SERPL CALCULATED.3IONS-SCNC: 6 MMOL/L (ref 3–14)
AST SERPL W P-5'-P-CCNC: 33 U/L (ref 0–45)
BILIRUB SERPL-MCNC: 0.8 MG/DL (ref 0.2–1.3)
BUN SERPL-MCNC: 22 MG/DL (ref 7–30)
CALCIUM SERPL-MCNC: 9.7 MG/DL (ref 8.5–10.1)
CHLORIDE SERPL-SCNC: 103 MMOL/L (ref 94–109)
CHOLEST SERPL-MCNC: 132 MG/DL
CO2 SERPL-SCNC: 29 MMOL/L (ref 20–32)
CREAT SERPL-MCNC: 0.98 MG/DL (ref 0.66–1.25)
CREAT UR-MCNC: 205 MG/DL
GFR SERPL CREATININE-BSD FRML MDRD: 75 ML/MIN/1.7M2
GLUCOSE SERPL-MCNC: 91 MG/DL (ref 70–99)
HDLC SERPL-MCNC: 40 MG/DL
LDLC SERPL CALC-MCNC: 66 MG/DL
MICROALBUMIN UR-MCNC: 6 MG/L
MICROALBUMIN/CREAT UR: 3.15 MG/G CR (ref 0–17)
NONHDLC SERPL-MCNC: 92 MG/DL
POTASSIUM SERPL-SCNC: 4.3 MMOL/L (ref 3.4–5.3)
PROT SERPL-MCNC: 7.5 G/DL (ref 6.8–8.8)
PSA SERPL-ACNC: 1.56 UG/L (ref 0–4)
SODIUM SERPL-SCNC: 138 MMOL/L (ref 133–144)
TRIGL SERPL-MCNC: 128 MG/DL

## 2017-10-11 NOTE — PROGRESS NOTES
"Ant chest lesion growing months to years  ROS no itch bleeding    /61 (BP Location: Right arm, Patient Position: Chair, Cuff Size: Adult Regular)  Pulse 62  Temp 97.9  F (36.6  C) (Oral)  Resp 12  Ht 5' 7\" (1.702 m)  Wt 171 lb (77.6 kg)  SpO2 97%  BMI 26.78 kg/m2     seb K ant chest  Cryo offered, pt leaves w/o  (L82.1) Seborrheic keratosis  Comment: Patient not too concerned  Re asses at return  Albert Huggins        "

## 2017-10-12 NOTE — PROGRESS NOTES
All tests are normal, except the diabetic control. Could I add 1 more medicine?  Albert Huggins MD

## 2017-10-16 ENCOUNTER — TELEPHONE (OUTPATIENT)
Dept: FAMILY MEDICINE | Facility: CLINIC | Age: 72
End: 2017-10-16

## 2017-10-16 ENCOUNTER — OFFICE VISIT (OUTPATIENT)
Dept: FAMILY MEDICINE | Facility: CLINIC | Age: 72
End: 2017-10-16
Payer: MEDICARE

## 2017-10-16 VITALS
HEART RATE: 80 BPM | TEMPERATURE: 97.5 F | WEIGHT: 173 LBS | SYSTOLIC BLOOD PRESSURE: 110 MMHG | RESPIRATION RATE: 18 BRPM | BODY MASS INDEX: 27.1 KG/M2 | DIASTOLIC BLOOD PRESSURE: 64 MMHG

## 2017-10-16 DIAGNOSIS — R07.0 THROAT PAIN: ICD-10-CM

## 2017-10-16 DIAGNOSIS — R07.0 THROAT PAIN: Primary | ICD-10-CM

## 2017-10-16 LAB
DEPRECATED S PYO AG THROAT QL EIA: NORMAL
SPECIMEN SOURCE: NORMAL

## 2017-10-16 PROCEDURE — 99213 OFFICE O/P EST LOW 20 MIN: CPT | Performed by: PHYSICIAN ASSISTANT

## 2017-10-16 PROCEDURE — 87880 STREP A ASSAY W/OPTIC: CPT | Performed by: PHYSICIAN ASSISTANT

## 2017-10-16 PROCEDURE — 87081 CULTURE SCREEN ONLY: CPT | Performed by: PHYSICIAN ASSISTANT

## 2017-10-16 RX ORDER — FLUTICASONE PROPIONATE 50 MCG
1-2 SPRAY, SUSPENSION (ML) NASAL DAILY
Qty: 16 G | Refills: 11 | Status: SHIPPED | OUTPATIENT
Start: 2017-10-16 | End: 2019-04-01 | Stop reason: SINTOL

## 2017-10-16 NOTE — PATIENT INSTRUCTIONS

## 2017-10-16 NOTE — MR AVS SNAPSHOT
After Visit Summary   10/16/2017    Kushal Bush    MRN: [id number]           Patient Information     Date Of Birth          1945        Visit Information        Provider Department      10/16/2017 11:00 AM Florentin Tomas PA-C [facility]        Today's Diagnoses     Throat pain    -  1      Care Instructions      Viral Upper Respiratory Illness (Adult)  You have a viral upper respiratory illness (URI), which is another term for the common cold. This illness is contagious during the first few days. It is spread through the air by coughing and sneezing. It may also be spread by direct contact (touching the sick person and then touching your own eyes, nose, or mouth). Frequent handwashing will decrease risk of spread. Most viral illnesses go away within 7 to 10 days with rest and simple home remedies. Sometimes the illness may last for several weeks. Antibiotics will not kill a virus, and they are generally not prescribed for this condition.    Home care    If symptoms are severe, rest at home for the first 2 to 3 days. When you resume activity, don't let yourself get too tired.    Avoid being exposed to cigarette smoke (yours or others ).    You may use acetaminophen or ibuprofen to control pain and fever, unless another medicine was prescribed. (Note: If you have chronic liver or kidney disease, have ever had a stomach ulcer or gastrointestinal bleeding, or are taking blood-thinning medicines, talk with your healthcare provider before using these medicines.) Aspirin should never be given to anyone under 18 years of age who is ill with a viral infection or fever. It may cause severe liver or brain damage.    Your appetite may be poor, so a light diet is fine. Avoid dehydration by drinking 6 to 8 glasses of fluids per day (water, soft drinks, juices, tea, or soup). Extra fluids will help loosen secretions in the nose and lungs.    Over-the-counter cold medicines will  not shorten the length of time you re sick, but they may be helpful for the following symptoms: cough, sore throat, and nasal and sinus congestion. (Note: Do not use decongestants if you have high blood pressure.)  Follow-up care  Follow up with your healthcare provider, or as advised.  When to seek medical advice  Call your healthcare provider right away if any of these occur:    Cough with lots of colored sputum (mucus)    Severe headache; face, neck, or ear pain    Difficulty swallowing due to throat pain    Fever of 100.4 F (38 C)  Call 911, or get immediate medical care  Call emergency services right away if any of these occur:    Chest pain, shortness of breath, wheezing, or difficulty breathing    Coughing up blood    Inability to swallow due to throat pain  Date Last Reviewed: 9/13/2015 2000-2017 [employer]. [address] [place] [address]. All rights reserved. This information is not intended as a substitute for professional medical care. Always follow your healthcare professional's instructions.                Follow-ups after your visit        Who to contact     If you have questions or need follow up information about today's clinic visit or your schedule please contact [facility] directly at [phone].  Normal or non-critical lab and imaging results will be communicated to you by [employer], letter or phone within 4 business days after the clinic has received the results. If you do not hear from us within 7 days, please contact the clinic through [employer] or phone. If you have a critical or abnormal lab result, we will notify you by phone as soon as possible.  Submit refill requests through [employer] or call your pharmacy and they will forward the refill request to us. Please allow 3 business days for your refill to be completed.          Additional Information About Your Visit        [employer] Information     [employer] gives you secure access to your electronic  health record. If you see a primary care provider, you can also send messages to your care team and make appointments. If you have questions, please call your primary care clinic.  If you do not have a primary care provider, please call [phone] and they will assist you.        Care EveryWhere ID     This is your Care EveryWhere ID. This could be used by other organizations to access your [facility] medical records  [id number]        Your Vitals Were     Pulse Temperature Respirations BMI (Body Mass Index)          80 97.5  F (36.4  C) (Oral) 18 27.1 kg/m2         Blood Pressure from Last 3 Encounters:   10/16/17 110/64   10/10/17 104/61   03/20/17 110/59    Weight from Last 3 Encounters:   10/16/17 173 lb (78.5 kg)   10/10/17 171 lb (77.6 kg)   03/20/17 172 lb 9.6 oz (78.3 kg)              We Performed the Following     Beta strep group A culture     Rapid strep screen          Today's Medication Changes          These changes are accurate as of: 10/16/17 11:28 AM.  If you have any questions, ask your nurse or doctor.               Start taking these medicines.        Dose/Directions    fluticasone 50 MCG/ACT spray   Commonly known as:  FLONASE   Used for:  Throat pain   Started by:  Florentin Tomas PA-C        Dose:  1-2 spray   Spray 1-2 sprays into both nostrils daily   Quantity:  16 g   Refills:  11            Where to get your medicines      These medications were sent to [facility] [address] [place] [address]  [id number] [place] [id number]    Hours:  24-hours Phone:  [phone]     fluticasone 50 MCG/ACT spray                Primary Care Provider Office Phone # Fax #    Albert Huggins [phone] [address] [facility] [address]        Equal Access to Services     CHELA ARREOLA AH: Hadii aad ku hadasho Soomaali, waaxda luqadaha, qaybta kaalmarachel hui, desmond salgado.  So [facility] [phone].    ATENCIÓN: Si jah pritchett, tiene a chicas disposición servicios gratuitos de asistencia lingüística. Angela galindo [phone].    We comply with applicable federal civil rights laws and [place] laws. We do not discriminate on the basis of race, color, national origin, age, disability, sex, sexual orientation, or gender identity.            Thank you!     Thank you for choosing [facility]  for your care. Our goal is always to provide you with excellent care. Hearing back from our patients is one way we can continue to improve our services. Please take a few minutes to complete the written survey that you may receive in the mail after your visit with us. Thank you!             Your Updated Medication List - Protect others around you: Learn how to safely use, store and throw away your medicines at www.disposemymeds.org.          This list is accurate as of: 10/16/17 11:28 AM.  Always use your most recent med list.                   Brand Name Dispense Instructions for use Diagnosis    aspirin 81 MG tablet     30 tablet    Take 1 tablet (81 mg) by mouth daily    Atypical chest pain       blood glucose monitoring test strip    TRUETRACK TEST    100 each    Use to test blood sugar 2 times daily or as directed.  Ok to substitute alternative if insurance prefers.    Uncontrolled type 2 diabetes mellitus without complication, without long-term current use of insulin (H)       doxazosin 4 MG tablet    CARDURA    90 tablet    Take 1 tablet (4 mg) by mouth At Bedtime    Essential hypertension with goal blood pressure less than 140/90, Benign non-nodular prostatic hyperplasia without lower urinary tract symptoms       fluticasone 50 MCG/ACT spray    FLONASE    16 g    Spray 1-2 sprays into both nostrils daily    Throat pain       glipiZIDE 10 MG tablet    GLUCOTROL    180 tablet    Take 1 tablet (10 mg) by mouth 2 times daily (before meals)    Uncontrolled type 2 diabetes mellitus without  complication, without long-term current use of insulin (H), Benign non-nodular prostatic hyperplasia without lower urinary tract symptoms, Essential hypertension with goal blood pressure less than 140/90, Atypical chest pain, Hyperlipidemia LDL goal <100       lisinopril 5 MG tablet    PRINIVIL/ZESTRIL    90 tablet    Take 1 tablet (5 mg) by mouth daily    Essential hypertension with goal blood pressure less than 140/90       metoprolol 25 MG tablet    LOPRESSOR    45 tablet    Take 0.5 tablets (12.5 mg) by mouth daily    Atypical chest pain, Essential hypertension with goal blood pressure less than 140/90       pravastatin 20 MG tablet    PRAVACHOL    90 tablet    TAKE 1 TABLET(20 MG) BY MOUTH DAILY    Hyperlipidemia LDL goal <100       * tamsulosin 0.4 MG capsule    FLOMAX    180 capsule    Take 2 capsules (0.8 mg) by mouth daily    Benign non-nodular prostatic hyperplasia without lower urinary tract symptoms       * tamsulosin 0.4 MG capsule    FLOMAX    90 capsule    Take 1 capsule (0.4 mg) by mouth daily    Benign non-nodular prostatic hyperplasia without lower urinary tract symptoms       terbinafine 250 MG tablet    lamISIL    90 tablet    Take 1 tablet (250 mg) by mouth daily    Onychomycosis       * Notice:  This list has 2 medication(s) that are the same as other medications prescribed for you. Read the directions carefully, and ask your doctor or other care provider to review them with you.

## 2017-10-16 NOTE — PROGRESS NOTES
SUBJECTIVE:   Kushal Bush is a 71 year old male who presents to clinic today for the following health issues:      Acute Illness   Acute illness concerns: cold sx  Onset: Thursday    Fever: no    Chills/Sweats: YES    Headache (location?): YES    Sinus Pressure:YES    Conjunctivitis:  no    Ear Pain: no    Rhinorrhea: YES    Congestion: YES    Sore Throat: YES-requests strep test.      Cough: YES    Wheeze: no     Decreased Appetite: YES    Nausea: no     Vomiting: no     Diarrhea:  no     Dysuria/Freq.: no     Fatigue/Achiness: YES     Sick/Strep Exposure: no   -patient requesting strep test     Therapies Tried and outcome: aleve, cold medicine from [place] with improvement.       Problem list and histories reviewed & adjusted, as indicated.  Additional history: as documented    Patient Active Problem List   Diagnosis     Intermittent asthma     Presbycusis     Post-nasal drainage     Heart palpitations     Seborrheic keratosis     BPH (benign prostatic hyperplasia)     Hyperlipidemia LDL goal <100     Lumbar radiculopathy     CMC arthritis, thumb, degenerative     ACP (advance care planning)     Fatigue, unspecified type     Campylobacter diarrhea     Essential hypertension with goal blood pressure less than 140/90     Benign non-nodular prostatic hyperplasia without lower urinary tract symptoms     Uncontrolled type 2 diabetes mellitus without complication, without long-term current use of insulin (H)     Past Surgical History:   Procedure Laterality Date     COLONOSCOPY  1/29/14     COLONOSCOPY  1/29/2014    Procedure: COMBINED COLONOSCOPY, SINGLE BIOPSY/POLYPECTOMY BY BIOPSY;  COLONOSCOPY two polyps with jumbo forceps;  Surgeon: Alina Hopkins MD;  Location:  GI     EYE SURGERY Right 12/14/16    cataract removal      ingrown toenail         Social History   Substance Use Topics     Smoking status: Never Smoker     Smokeless tobacco: Never Used     Alcohol use Yes      Comment: occas.       Family  History   Problem Relation Age of Onset     CANCER Mother      age 89, uterine/breast     HEART DISEASE Father      51         Current Outpatient Prescriptions   Medication Sig Dispense Refill     fluticasone (FLONASE) 50 MCG/ACT spray Spray 1-2 sprays into both nostrils daily 16 g 11     tamsulosin (FLOMAX) 0.4 MG capsule Take 2 capsules (0.8 mg) by mouth daily 180 capsule 1     tamsulosin (FLOMAX) 0.4 MG capsule Take 1 capsule (0.4 mg) by mouth daily 90 capsule 1     doxazosin (CARDURA) 4 MG tablet Take 1 tablet (4 mg) by mouth At Bedtime 90 tablet 1     pravastatin (PRAVACHOL) 20 MG tablet TAKE 1 TABLET(20 MG) BY MOUTH DAILY 90 tablet 3     glipiZIDE (GLUCOTROL) 10 MG tablet Take 1 tablet (10 mg) by mouth 2 times daily (before meals) 180 tablet 1     blood glucose monitoring (TRUETRACK TEST) test strip Use to test blood sugar 2 times daily or as directed.  Ok to substitute alternative if insurance prefers. 100 each 5     lisinopril (PRINIVIL/ZESTRIL) 5 MG tablet Take 1 tablet (5 mg) by mouth daily 90 tablet 1     metoprolol (LOPRESSOR) 25 MG tablet Take 0.5 tablets (12.5 mg) by mouth daily 45 tablet 1     aspirin 81 MG tablet Take 1 tablet (81 mg) by mouth daily 30 tablet 1     terbinafine (LAMISIL) 250 MG tablet Take 1 tablet (250 mg) by mouth daily 90 tablet 0     No Known Allergies  BP Readings from Last 3 Encounters:   10/16/17 110/64   10/10/17 104/61   03/20/17 110/59    Wt Readings from Last 3 Encounters:   10/16/17 173 lb (78.5 kg)   10/10/17 171 lb (77.6 kg)   03/20/17 172 lb 9.6 oz (78.3 kg)                        Reviewed and updated as needed this visit by clinical staffTobacco  Allergies  Meds  Problems  Med Hx  Surg Hx  Fam Hx  Soc Hx        Reviewed and updated as needed this visit by Provider  Allergies  Meds  Problems         ROS:  Constitutional, HEENT, cardiovascular, pulmonary, gi and gu systems are negative, except as otherwise noted.      OBJECTIVE:   /64 (BP Location: Right  arm, Patient Position: Chair, Cuff Size: Adult Large)  Pulse 80  Temp 97.5  F (36.4  C) (Oral)  Resp 18  Wt 173 lb (78.5 kg)  BMI 27.1 kg/m2  Body mass index is 27.1 kg/(m^2).  GENERAL: healthy, alert and no distress  EYES: Eyes grossly normal to inspection, PERRL and conjunctivae and sclerae normal  HENT: normal cephalic/atraumatic, both ears: clear effusion, nasal mucosa edematous , oropharynx clear, oral mucous membranes moist and sinuses: not tender  NECK: no adenopathy, no asymmetry, masses, or scars and thyroid normal to palpation  RESP: lungs clear to auscultation - no rales, rhonchi or wheezes  CV: regular rates and rhythm, normal S1 S2, no S3 or S4 and no murmur, click or rub  SKIN: no suspicious lesions or rashes  PSYCH: mentation appears normal, affect normal/bright    Diagnostic Test Results:  Results for orders placed or performed in visit on 10/16/17 (from the past 24 hour(s))   Rapid strep screen   Result Value Ref Range    Specimen Description Throat     Rapid Strep A Screen       NEGATIVE: No Group A streptococcal antigen detected by immunoassay, await culture report.       ASSESSMENT/PLAN:     (R07.0) Throat pain  (primary encounter diagnosis)  Comment: rapid strep negative. Likely viral given course. No evidence of secondary bacterial infection such as sinusitis or pneumonia. No evidence of respiratory distress. Recommending continued supportive care measures with flonase due to history of post nasal drip. If no improvement in 1-2 weeks, patient should RTC. Sooner if worsening.   Plan: Rapid strep screen, Beta strep group A culture,        fluticasone (FLONASE) 50 MCG/ACT spray        -Medication use and side effects discussed with the patient. Patient is in complete understanding and agreement with plan.         Follow up: as above     Florentin Tomas PA-C  [facility]

## 2017-10-17 LAB
BACTERIA SPEC CULT: NORMAL
SPECIMEN SOURCE: NORMAL

## 2017-10-18 RX ORDER — FLUTICASONE PROPIONATE 50 MCG
SPRAY, SUSPENSION (ML) NASAL
Start: 2017-10-18

## 2017-10-18 NOTE — TELEPHONE ENCOUNTER
Left message to call back .   Did you requests 90 day refills of fluticasone?  Your pharmacist said you did.   If you use this daily all year long we can certainly change the prescription from one month to 3 months.    90 day refill denied with note to pharmacist:  *We left message for pt to call the clinic to confirm he requested 90 day refills.*  Binh Bearden, RN

## 2017-11-02 NOTE — TELEPHONE ENCOUNTER
Panel Management Review      Patient has the following on his problem list:     Asthma review     ACT Total Scores 11/21/2016   ACT TOTAL SCORE -   ASTHMA ER VISITS -   ASTHMA HOSPITALIZATIONS -   ACT TOTAL SCORE (Goal Greater than or Equal to 20) 24   In the past 12 months, how many times did you visit the emergency room for your asthma without being admitted to the hospital? 0   In the past 12 months, how many times were you hospitalized overnight because of your asthma? 0      1. Is Asthma diagnosis on the Problem List? Yes    2. Is Asthma listed on Health Maintenance? Yes    3. Patient is due for:  ACT        Composite cancer screening  Chart review shows that this patient is due/due soon for the following None  Summary:    Patient is due/failing the following:   ACT    Action needed:   Patient needs to do ACT.    Type of outreach:    Phone, spoke to patient.  he states he hasn't had asthma for some time, was put on an inhaler a long time ago when he used run and it was cold out, Dr. SUAREZ can you please take off problem list?    Questions for provider review:    None                                                                                                                                    Karen Amador/SRAVANI  [place]---[facility]       Chart routed to Provider .

## 2018-02-15 ENCOUNTER — TELEPHONE (OUTPATIENT)
Dept: FAMILY MEDICINE | Facility: CLINIC | Age: 73
End: 2018-02-15

## 2018-02-15 NOTE — TELEPHONE ENCOUNTER
Panel Management Review      Patient has the following on his problem list:     Diabetes    ASA: Passed    Last A1C  Lab Results   Component Value Date    A1C 8.3 10/10/2017    A1C 8.1 03/20/2017    A1C 9.2 11/21/2016    A1C 9.7 07/06/2016    A1C 8.1 11/30/2015     A1C tested: FAILED    Last LDL:    Lab Results   Component Value Date    CHOL 132 10/10/2017     Lab Results   Component Value Date    HDL 40 10/10/2017     Lab Results   Component Value Date    LDL 66 10/10/2017     Lab Results   Component Value Date    TRIG 128 10/10/2017     Lab Results   Component Value Date    CHOLHDLRATIO 4.3 01/14/2013     Lab Results   Component Value Date    NHDL 92 10/10/2017       Is the patient on a Statin? YES             Is the patient on Aspirin? YES    Medications     HMG CoA Reductase Inhibitors    pravastatin (PRAVACHOL) 20 MG tablet    Salicylates    aspirin 81 MG tablet          Last three blood pressure readings:  BP Readings from Last 3 Encounters:   10/16/17 110/64   10/10/17 104/61   03/20/17 110/59       Date of last diabetes office visit: 10/16/17     Tobacco History:     History   Smoking Status     Never Smoker   Smokeless Tobacco     Never Used         Hypertension   Last three blood pressure readings:  BP Readings from Last 3 Encounters:   10/16/17 110/64   10/10/17 104/61   03/20/17 110/59     Blood pressure: Passed    HTN Guidelines:  Age 18-59 BP range:  Less than 140/90  Age 60-85 with Diabetes:  Less than 140/90  Age 60-85 without Diabetes:  less than 150/90      Composite cancer screening  Chart review shows that this patient is due/due soon for the following None  Summary:    Patient is due/failing the following:   Wellness exam, A1C and ACT    Action needed:   Patient needs office visit for Wellness exam  and Patient needs to do ACT.    Type of outreach:    Phone, spoke to patient.  Pt will call back to make an appointment     Questions for provider review:    None                                                                                                                                     Suzette Sy MA      .

## 2018-03-26 ENCOUNTER — OFFICE VISIT (OUTPATIENT)
Dept: UROLOGY | Facility: CLINIC | Age: 73
End: 2018-03-26
Payer: MEDICARE

## 2018-03-26 VITALS — WEIGHT: 173 LBS | BODY MASS INDEX: 27.15 KG/M2 | OXYGEN SATURATION: 97 % | HEIGHT: 67 IN | HEART RATE: 68 BPM

## 2018-03-26 DIAGNOSIS — R35.1 NOCTURIA: ICD-10-CM

## 2018-03-26 DIAGNOSIS — N40.0 ENLARGED PROSTATE: Primary | ICD-10-CM

## 2018-03-26 LAB
ALBUMIN UR-MCNC: NEGATIVE MG/DL
APPEARANCE UR: CLEAR
BILIRUB UR QL STRIP: NEGATIVE
COLOR UR AUTO: YELLOW
GLUCOSE UR STRIP-MCNC: 500 MG/DL
HGB UR QL STRIP: NEGATIVE
KETONES UR STRIP-MCNC: NEGATIVE MG/DL
LEUKOCYTE ESTERASE UR QL STRIP: NEGATIVE
NITRATE UR QL: NEGATIVE
PH UR STRIP: 5.5 PH (ref 5–7)
RESIDUAL VOLUME (RV) (EXTERNAL): 19
SOURCE: ABNORMAL
SP GR UR STRIP: 1.02 (ref 1–1.03)
UROBILINOGEN UR STRIP-ACNC: 0.2 EU/DL (ref 0.2–1)

## 2018-03-26 PROCEDURE — 81003 URINALYSIS AUTO W/O SCOPE: CPT | Mod: QW | Performed by: UROLOGY

## 2018-03-26 PROCEDURE — 99203 OFFICE O/P NEW LOW 30 MIN: CPT | Performed by: UROLOGY

## 2018-03-26 ASSESSMENT — PAIN SCALES - GENERAL: PAINLEVEL: NO PAIN (0)

## 2018-03-26 ASSESSMENT — ENCOUNTER SYMPTOMS
HEMATURIA: 0
SORE THROAT: 1
FLANK PAIN: 0
SINUS PAIN: 0
HOARSE VOICE: 0
DIFFICULTY URINATING: 1
TASTE DISTURBANCE: 0
SMELL DISTURBANCE: 0
SINUS CONGESTION: 1
NECK MASS: 0
DYSURIA: 0
TROUBLE SWALLOWING: 0

## 2018-03-26 NOTE — MR AVS SNAPSHOT
After Visit Summary   3/26/2018    Kushal Bush    MRN: [id number]           Patient Information     Date Of Birth          1945        Visit Information        Provider Department      3/26/2018 10:15 AM Dejuan Felix MD [facility]        Today's Diagnoses     Enlarged prostate    -  1    Nocturia           Follow-ups after your visit        Follow-up notes from your care team     Return for Office cystoscopy.      Your next 10 appointments already scheduled     Apr 09, 2018 11:00 AM CDT   Cystoscopy with Dejuan Felix MD, UB CYF   [facility] ([facility])    [address]  [address]  [facility] [address] [phone]              Who to contact     If you have questions or need follow up information about today's clinic visit or your schedule please contact [facility] directly at [phone].  Normal or non-critical lab and imaging results will be communicated to you by [email or web address], letter or phone within 4 business days after the clinic has received the results. If you do not hear from us within 7 days, please contact the clinic through [email or web address] or phone. If you have a critical or abnormal lab result, we will notify you by phone as soon as possible.  Submit refill requests through ClickDelivery or call your pharmacy and they will forward the refill request to us. Please allow 3 business days for your refill to be completed.          Additional Information About Your Visit        MyChart Information     ClickDelivery gives you secure access to your electronic health record. If you see a primary care provider, you can also send messages to your care team and make appointments. If you have questions, please call your primary care clinic.  If you do not have a primary care provider, please call [phone] and they will  "assist you.        Care EveryWhere ID     This is your Care EveryWhere ID. This could be used by other organizations to access your [facility] medical records  [id number]        Your Vitals Were     Pulse Height Pulse Oximetry BMI (Body Mass Index)          68 1.702 m (5' 7\") 97% 27.1 kg/m2         Blood Pressure from Last 3 Encounters:   10/16/17 110/64   10/10/17 104/61   03/20/17 110/59    Weight from Last 3 Encounters:   03/26/18 78.5 kg (173 lb)   10/16/17 78.5 kg (173 lb)   10/10/17 77.6 kg (171 lb)              We Performed the Following     Bladder scan     UA without Microscopic        Primary Care Provider Office Phone # Fax #    Albert Huggins [phone] [address] [facility] [address]        Equal Access to Services     [facility]: Hadii aad ku hadasho Soomaali, waaxda luqadaha, qaybta kaalmada adeegyada, waxay sergioin hayaan jean peoples . So [facility] [phone].    ATENCIÓN: Si habla español, tiene a chicas disposición servicios gratuitos de asistencia lingüística. Maggieame al [phone].    We comply with applicable federal civil rights laws and [place] laws. We do not discriminate on the basis of race, color, national origin, age, disability, sex, sexual orientation, or gender identity.            Thank you!     Thank you for choosing [facility] [place]  for your care. Our goal is always to provide you with excellent care. Hearing back from our patients is one way we can continue to improve our services. Please take a few minutes to complete the written survey that you may receive in the mail after your visit with us. Thank you!             Your Updated Medication List - Protect others around you: Learn how to safely use, store and throw away your medicines at www.disposemymeds.org.          This list is accurate as of 3/26/18 10:53 AM.  Always use your most recent med list.                   Brand Name Dispense Instructions " for use Diagnosis    aspirin 81 MG tablet     30 tablet    Take 1 tablet (81 mg) by mouth daily    Atypical chest pain       blood glucose monitoring test strip    TRUETRACK TEST    100 each    Use to test blood sugar 2 times daily or as directed.  Ok to substitute alternative if insurance prefers.    Uncontrolled type 2 diabetes mellitus without complication, without long-term current use of insulin (H)       doxazosin 4 MG tablet    CARDURA    90 tablet    Take 1 tablet (4 mg) by mouth At Bedtime    Essential hypertension with goal blood pressure less than 140/90, Benign non-nodular prostatic hyperplasia without lower urinary tract symptoms       fluticasone 50 MCG/ACT spray    FLONASE    16 g    Spray 1-2 sprays into both nostrils daily    Throat pain       * glipiZIDE 10 MG tablet    GLUCOTROL    180 tablet    Take 1 tablet (10 mg) by mouth 2 times daily (before meals)    Uncontrolled type 2 diabetes mellitus without complication, without long-term current use of insulin (H), Benign non-nodular prostatic hyperplasia without lower urinary tract symptoms, Essential hypertension with goal blood pressure less than 140/90, Atypical chest pain, Hyperlipidemia LDL goal <100       * glipiZIDE 10 MG tablet    GLUCOTROL    180 tablet    Take 1 tablet (10 mg) by mouth 2 times daily (before meals)    Uncontrolled type 2 diabetes mellitus without complication, without long-term current use of insulin (H), Benign non-nodular prostatic hyperplasia without lower urinary tract symptoms, Essential hypertension with goal blood pressure less than 140/90, Atypical chest pain, Hyperlipidemia LDL goal <100       lisinopril 5 MG tablet    PRINIVIL/ZESTRIL    90 tablet    Take 1 tablet (5 mg) by mouth daily    Essential hypertension with goal blood pressure less than 140/90       metoprolol tartrate 25 MG tablet    LOPRESSOR    45 tablet    Take 0.5 tablets (12.5 mg) by mouth daily    Atypical chest pain, Essential hypertension with  goal blood pressure less than 140/90       pravastatin 20 MG tablet    PRAVACHOL    90 tablet    TAKE 1 TABLET(20 MG) BY MOUTH DAILY    Hyperlipidemia LDL goal <100       * tamsulosin 0.4 MG capsule    FLOMAX    180 capsule    Take 2 capsules (0.8 mg) by mouth daily    Benign non-nodular prostatic hyperplasia without lower urinary tract symptoms       * tamsulosin 0.4 MG capsule    FLOMAX    90 capsule    Take 1 capsule (0.4 mg) by mouth daily    Benign non-nodular prostatic hyperplasia without lower urinary tract symptoms       terbinafine 250 MG tablet    lamISIL    90 tablet    Take 1 tablet (250 mg) by mouth daily    Onychomycosis       * Notice:  This list has 4 medication(s) that are the same as other medications prescribed for you. Read the directions carefully, and ask your doctor or other care provider to review them with you.

## 2018-03-26 NOTE — LETTER
3/26/2018       RE: Kushal Bush  [address]  [facility] [address]     Dear Colleague,    Thank you for referring your patient, Kushal Bush, to the [facility] [place] at [facility]. Please see a copy of my visit note below.    [facility]  Main Office: [address]  [address]  [place] [address]       CHIEF COMPLAINT:  Enlarged prostate and difficulty urinating    HISTORY:   I was asked by Dr Huggins to see this healthy 72-year-old gentleman who has been taking Flomax 0.8 mg for a few years because of difficulty urinating, mainly at night. He says he gets up 3 or 4 times a night to urinate and during the nighttime it is more difficult to urinate with a slow urinary stream. He takes a long time to empty. He says the Flomax does help this but he feels is slowly getting worse. He says during the day he does not have as many problems. He has no history of gross hematuria or infections. No family history of prostate cancer.      PAST MEDICAL HISTORY:   Past Medical History:   Diagnosis Date     Asthma      Benign non-nodular prostatic hyperplasia without lower urinary tract symptoms 7/18/2016     BPH (benign prostatic hyperplasia) 9/14/2011     Campylobacter diarrhea 7/18/2016     CMC arthritis, thumb, degenerative 11/25/2013     Diabetes type 2, uncontrolled (H) 7/18/2016     Essential hypertension with goal blood pressure less than 140/90 7/18/2016     Fatigue, unspecified type 7/18/2016     Heart palpitations 9/14/2011     HTN (hypertension) 9/14/2011     HTN, goal below 140/90 1/4/2012     Hyperglycemia 10/27/2011     Hyperlipidemia LDL goal <100 10/27/2011     Impacted cerumen 9/14/2011     Intermittent asthma 9/14/2011     Lumbar radiculopathy 11/7/2011     Mumps      Palpitations      Post-nasal drainage 9/14/2011     Presbycusis 9/14/2011     Seborrheic keratoses 9/14/2011     Tubular adenoma of colon       Type 2 diabetes mellitus without complications (H) 10/12/2015     Type 2 diabetes, HbA1c goal < 7% (H) 1/6/2012     Type II diabetes mellitus with HbA1C goal between 7 and 8 6/18/2013     Uncontrolled type 2 diabetes mellitus without complication, without long-term current use of insulin (H) 12/27/2016       PAST SURGICAL HISTORY:   Past Surgical History:   Procedure Laterality Date     COLONOSCOPY  1/29/14     COLONOSCOPY  1/29/2014    Procedure: COMBINED COLONOSCOPY, SINGLE BIOPSY/POLYPECTOMY BY BIOPSY;  COLONOSCOPY two polyps with jumbo forceps;  Surgeon: Alina Hopkins MD;  Location:  GI     EYE SURGERY Right 12/14/16    cataract removal      ingrown toenail         FAMILY HISTORY:   Family History   Problem Relation Age of Onset     CANCER Mother      age 89, uterine/breast     HEART DISEASE Father      51       SOCIAL HISTORY:   Social History   Substance Use Topics     Smoking status: Never Smoker     Smokeless tobacco: Never Used     Alcohol use Yes      Comment: occas.            Allergies   Allergen Reactions     Seasonal Allergies      Pine pollen         Current Outpatient Prescriptions:      glipiZIDE (GLUCOTROL) 10 MG tablet, Take 1 tablet (10 mg) by mouth 2 times daily (before meals), Disp: 180 tablet, Rfl: 0     tamsulosin (FLOMAX) 0.4 MG capsule, Take 2 capsules (0.8 mg) by mouth daily, Disp: 180 capsule, Rfl: 1     tamsulosin (FLOMAX) 0.4 MG capsule, Take 1 capsule (0.4 mg) by mouth daily, Disp: 90 capsule, Rfl: 1     doxazosin (CARDURA) 4 MG tablet, Take 1 tablet (4 mg) by mouth At Bedtime, Disp: 90 tablet, Rfl: 1     pravastatin (PRAVACHOL) 20 MG tablet, TAKE 1 TABLET(20 MG) BY MOUTH DAILY, Disp: 90 tablet, Rfl: 3     glipiZIDE (GLUCOTROL) 10 MG tablet, Take 1 tablet (10 mg) by mouth 2 times daily (before meals), Disp: 180 tablet, Rfl: 1     lisinopril (PRINIVIL/ZESTRIL) 5 MG tablet, Take 1 tablet (5 mg) by mouth daily, Disp: 90 tablet, Rfl: 1     metoprolol (LOPRESSOR) 25 MG tablet,  "Take 0.5 tablets (12.5 mg) by mouth daily, Disp: 45 tablet, Rfl: 1     aspirin 81 MG tablet, Take 1 tablet (81 mg) by mouth daily, Disp: 30 tablet, Rfl: 1     fluticasone (FLONASE) 50 MCG/ACT spray, Spray 1-2 sprays into both nostrils daily, Disp: 16 g, Rfl: 11     blood glucose monitoring (TRUETRACK TEST) test strip, Use to test blood sugar 2 times daily or as directed.  Ok to substitute alternative if insurance prefers. (Patient not taking: Reported on 3/26/2018), Disp: 100 each, Rfl: 5     terbinafine (LAMISIL) 250 MG tablet, Take 1 tablet (250 mg) by mouth daily (Patient not taking: Reported on 3/26/2018), Disp: 90 tablet, Rfl: 0    Review Of Systems:  Skin: negative  Eyes: negative  Ears/Nose/Throat: negative  Respiratory: No shortness of breath, dyspnea on exertion, cough, or hemoptysis  Cardiovascular: negative  Gastrointestinal: negative  Genitourinary: negative  Musculoskeletal: negative  Neurologic: negative  Psychiatric: negative  Hematologic/Lymphatic/Immunologic: negative  Endocrine: negative      PHYSICAL EXAM:    Pulse 68  Ht 1.702 m (5' 7\")  Wt 78.5 kg (173 lb)  SpO2 97%  BMI 27.1 kg/m2  General appearance: In NAD, conversant  HEENT: Normocephalic and atraumatic, anicteric sclera  Cardiovascular: Not examined  Respiratory: normal, non-labored breathing  Gastrointestinal: negative, Abdomen soft, non-tender, and non-distended.   Musculoskeletal: Not Examined  Peripheral Vascular/extremity: No peripheral edema  Skin: Normal temperature, turgor, and texture. No rash  Psychiatric: Appropriate affect, alert and oriented to person, place, and time    Penis: Normal  Scrotal skin: Normal, no lesions  Testicles: Normal to palpation bilaterally  Epididymis: Normal to palpation bilaterally  Lymphatic: Normal inguinal lymph nodes  Digital Rectal Exam: His prostate is moderately enlarged but benign and symmetric to palpation    Cystoscopy: Not done      PSA: 1.56 in October    UA RESULTS:  Recent Labs   Lab " Test  07/18/16   0911  04/30/12   1222   COLOR  Yellow  Yellow   APPEARANCE  Clear  Clear   URINEGLC  Negative  500*   URINEBILI  Negative  Negative   URINEKETONE  Trace*  Trace*   SG  1.020  1.025   UBLD  Negative  Trace*   URINEPH  6.0  5.5   PROTEIN  Negative  Negative   UROBILINOGEN  0.2  0.2   NITRITE  Negative  Negative   LEUKEST  Negative  Negative   RBCU   --   O - 2   WBCU   --   O - 2       Bladder Scan: 19mL    Other Labs:      Imaging Studies: None      CLINICAL IMPRESSION:   Enlarged prostate, nocturia, slow urinary stream    PLAN:   He has an enlarged prostate resulting in his urinary symptoms.  We discussed all medical and surgical treatment options for enlarged prostate.  He is already taking Flomax 0.8 mg per day.For medical treatment options he could add finasteride daily and we discussed this option. We discussed surgical options. He thinks he might be interested in any surgery on the prostate in the future. I recommended that we proceed with a cystoscopy in the office before considering surgery. He agrees to this.We will get him scheduled for cystoscopy in the office in the near future.      Again, thank you for allowing me to participate in the care of your patient.      Sincerely,    Dejuan Felix MD

## 2018-03-26 NOTE — NURSING NOTE
Pt up 3ish times a nt to void.  Pt states he has a slow flow.  Last PSA in epic from .  Pt denies dysuria or gross hematuria.  Pt states he thinks the flomax helps.  He has been on flomax for about 3 years.  GAVI Johnson, CMA

## 2018-03-26 NOTE — PROGRESS NOTES
[facility]  [address]  [address]  [place] [address]       CHIEF COMPLAINT:  Enlarged prostate and difficulty urinating    HISTORY:   I was asked by Dr Huggins to see this healthy 72-year-old gentleman who has been taking Flomax 0.8 mg for a few years because of difficulty urinating, mainly at night. He says he gets up 3 or 4 times a night to urinate and during the nighttime it is more difficult to urinate with a slow urinary stream. He takes a long time to empty. He says the Flomax does help this but he feels is slowly getting worse. He says during the day he does not have as many problems. He has no history of gross hematuria or infections. No family history of prostate cancer.      PAST MEDICAL HISTORY:   Past Medical History:   Diagnosis Date     Asthma      Benign non-nodular prostatic hyperplasia without lower urinary tract symptoms 7/18/2016     BPH (benign prostatic hyperplasia) 9/14/2011     Campylobacter diarrhea 7/18/2016     CMC arthritis, thumb, degenerative 11/25/2013     Diabetes type 2, uncontrolled (H) 7/18/2016     Essential hypertension with goal blood pressure less than 140/90 7/18/2016     Fatigue, unspecified type 7/18/2016     Heart palpitations 9/14/2011     HTN (hypertension) 9/14/2011     HTN, goal below 140/90 1/4/2012     Hyperglycemia 10/27/2011     Hyperlipidemia LDL goal <100 10/27/2011     Impacted cerumen 9/14/2011     Intermittent asthma 9/14/2011     Lumbar radiculopathy 11/7/2011     Mumps      Palpitations      Post-nasal drainage 9/14/2011     Presbycusis 9/14/2011     Seborrheic keratoses 9/14/2011     Tubular adenoma of colon      Type 2 diabetes mellitus without complications (H) 10/12/2015     Type 2 diabetes, HbA1c goal < 7% (H) 1/6/2012     Type II diabetes mellitus with HbA1C goal between 7 and 8 6/18/2013     Uncontrolled type 2 diabetes mellitus without complication, without long-term current use of insulin (H) 12/27/2016       PAST SURGICAL  HISTORY:   Past Surgical History:   Procedure Laterality Date     COLONOSCOPY  1/29/14     COLONOSCOPY  1/29/2014    Procedure: COMBINED COLONOSCOPY, SINGLE BIOPSY/POLYPECTOMY BY BIOPSY;  COLONOSCOPY two polyps with jumbo forceps;  Surgeon: Alina Hopkins MD;  Location:  GI     EYE SURGERY Right 12/14/16    cataract removal      ingrown toenail         FAMILY HISTORY:   Family History   Problem Relation Age of Onset     CANCER Mother      age 89, uterine/breast     HEART DISEASE Father      51       SOCIAL HISTORY:   Social History   Substance Use Topics     Smoking status: Never Smoker     Smokeless tobacco: Never Used     Alcohol use Yes      Comment: occas.            Allergies   Allergen Reactions     Seasonal Allergies      Pine pollen         Current Outpatient Prescriptions:      glipiZIDE (GLUCOTROL) 10 MG tablet, Take 1 tablet (10 mg) by mouth 2 times daily (before meals), Disp: 180 tablet, Rfl: 0     tamsulosin (FLOMAX) 0.4 MG capsule, Take 2 capsules (0.8 mg) by mouth daily, Disp: 180 capsule, Rfl: 1     tamsulosin (FLOMAX) 0.4 MG capsule, Take 1 capsule (0.4 mg) by mouth daily, Disp: 90 capsule, Rfl: 1     doxazosin (CARDURA) 4 MG tablet, Take 1 tablet (4 mg) by mouth At Bedtime, Disp: 90 tablet, Rfl: 1     pravastatin (PRAVACHOL) 20 MG tablet, TAKE 1 TABLET(20 MG) BY MOUTH DAILY, Disp: 90 tablet, Rfl: 3     glipiZIDE (GLUCOTROL) 10 MG tablet, Take 1 tablet (10 mg) by mouth 2 times daily (before meals), Disp: 180 tablet, Rfl: 1     lisinopril (PRINIVIL/ZESTRIL) 5 MG tablet, Take 1 tablet (5 mg) by mouth daily, Disp: 90 tablet, Rfl: 1     metoprolol (LOPRESSOR) 25 MG tablet, Take 0.5 tablets (12.5 mg) by mouth daily, Disp: 45 tablet, Rfl: 1     aspirin 81 MG tablet, Take 1 tablet (81 mg) by mouth daily, Disp: 30 tablet, Rfl: 1     fluticasone (FLONASE) 50 MCG/ACT spray, Spray 1-2 sprays into both nostrils daily, Disp: 16 g, Rfl: 11     blood glucose monitoring (TRUETRACK TEST) test strip, Use to  "test blood sugar 2 times daily or as directed.  Ok to substitute alternative if insurance prefers. (Patient not taking: Reported on 3/26/2018), Disp: 100 each, Rfl: 5     terbinafine (LAMISIL) 250 MG tablet, Take 1 tablet (250 mg) by mouth daily (Patient not taking: Reported on 3/26/2018), Disp: 90 tablet, Rfl: 0    Review Of Systems:  Skin: negative  Eyes: negative  Ears/Nose/Throat: negative  Respiratory: No shortness of breath, dyspnea on exertion, cough, or hemoptysis  Cardiovascular: negative  Gastrointestinal: negative  Genitourinary: negative  Musculoskeletal: negative  Neurologic: negative  Psychiatric: negative  Hematologic/Lymphatic/Immunologic: negative  Endocrine: negative      PHYSICAL EXAM:    Pulse 68  Ht 1.702 m (5' 7\")  Wt 78.5 kg (173 lb)  SpO2 97%  BMI 27.1 kg/m2  General appearance: In NAD, conversant  HEENT: Normocephalic and atraumatic, anicteric sclera  Cardiovascular: Not examined  Respiratory: normal, non-labored breathing  Gastrointestinal: negative, Abdomen soft, non-tender, and non-distended.   Musculoskeletal: Not Examined  Peripheral Vascular/extremity: No peripheral edema  Skin: Normal temperature, turgor, and texture. No rash  Psychiatric: Appropriate affect, alert and oriented to person, place, and time    Penis: Normal  Scrotal skin: Normal, no lesions  Testicles: Normal to palpation bilaterally  Epididymis: Normal to palpation bilaterally  Lymphatic: Normal inguinal lymph nodes  Digital Rectal Exam: His prostate is moderately enlarged but benign and symmetric to palpation    Cystoscopy: Not done      PSA: 1.56 in October    UA RESULTS:  Recent Labs   Lab Test  07/18/16   0911  04/30/12   1222   COLOR  Yellow  Yellow   APPEARANCE  Clear  Clear   URINEGLC  Negative  500*   URINEBILI  Negative  Negative   URINEKETONE  Trace*  Trace*   SG  1.020  1.025   UBLD  Negative  Trace*   URINEPH  6.0  5.5   PROTEIN  Negative  Negative   UROBILINOGEN  0.2  0.2   NITRITE  Negative  Negative "   LEUKEST  Negative  Negative   RBCU   --   O - 2   WBCU   --   O - 2       Bladder Scan: 19mL    Other Labs:      Imaging Studies: None      CLINICAL IMPRESSION:   Enlarged prostate, nocturia, slow urinary stream    PLAN:   He has an enlarged prostate resulting in his urinary symptoms.  We discussed all medical and surgical treatment options for enlarged prostate.  He is already taking Flomax 0.8 mg per day.For medical treatment options he could add finasteride daily and we discussed this option. We discussed surgical options. He thinks he might be interested in any surgery on the prostate in the future. I recommended that we proceed with a cystoscopy in the office before considering surgery. He agrees to this.We will get him scheduled for cystoscopy in the office in the near future.      Dejuan Felix MD    Answers for HPI/ROS submitted by the patient on 3/26/2018   General Symptoms: No  Skin Symptoms: No  HENT Symptoms: Yes  EYE SYMPTOMS: No  HEART SYMPTOMS: No  LUNG SYMPTOMS: No  INTESTINAL SYMPTOMS: No  URINARY SYMPTOMS: Yes  REPRODUCTIVE SYMPTOMS: No  SKELETAL SYMPTOMS: No  BLOOD SYMPTOMS: No  NERVOUS SYSTEM SYMPTOMS: No  MENTAL HEALTH SYMPTOMS: No  Ear pain: No  Ear discharge: No  Hearing loss: No  Tinnitus: Yes  Nosebleeds: No  Congestion: Yes  Sinus pain: No  Trouble swallowing: No   Voice hoarseness: No  Mouth sores: No  Sore throat: Yes  Tooth pain: No  Gum tenderness: No  Bleeding gums: No  Change in taste: No  Change in sense of smell: No  Dry mouth: No  Hearing aid used: No  Neck lump: No  Trouble holding urine or incontinence: No  Pain or burning: No  Trouble starting or stopping: Yes  Increased frequency of urination: Yes  Blood in urine: No  Decreased frequency of urination: No  Frequent nighttime urination: Yes  Flank pain: No  Difficulty emptying bladder: Yes

## 2018-04-06 DIAGNOSIS — N40.0 BPH (BENIGN PROSTATIC HYPERPLASIA): Primary | ICD-10-CM

## 2018-04-09 ENCOUNTER — OFFICE VISIT (OUTPATIENT)
Dept: UROLOGY | Facility: CLINIC | Age: 73
End: 2018-04-09
Payer: MEDICARE

## 2018-04-09 VITALS
BODY MASS INDEX: 25.01 KG/M2 | HEIGHT: 68 IN | WEIGHT: 165 LBS | SYSTOLIC BLOOD PRESSURE: 120 MMHG | DIASTOLIC BLOOD PRESSURE: 66 MMHG | HEART RATE: 72 BPM

## 2018-04-09 DIAGNOSIS — N40.0 ENLARGED PROSTATE: Primary | ICD-10-CM

## 2018-04-09 LAB
ALBUMIN UR-MCNC: NEGATIVE MG/DL
APPEARANCE UR: CLEAR
BILIRUB UR QL STRIP: NEGATIVE
COLOR UR AUTO: YELLOW
GLUCOSE UR STRIP-MCNC: 500 MG/DL
HGB UR QL STRIP: NEGATIVE
KETONES UR STRIP-MCNC: ABNORMAL MG/DL
LEUKOCYTE ESTERASE UR QL STRIP: NEGATIVE
NITRATE UR QL: NEGATIVE
PH UR STRIP: 5.5 PH (ref 5–7)
SOURCE: ABNORMAL
SP GR UR STRIP: 1.02 (ref 1–1.03)
UROBILINOGEN UR STRIP-ACNC: 0.2 EU/DL (ref 0.2–1)

## 2018-04-09 PROCEDURE — 52000 CYSTOURETHROSCOPY: CPT | Performed by: UROLOGY

## 2018-04-09 PROCEDURE — 81003 URINALYSIS AUTO W/O SCOPE: CPT | Mod: QW | Performed by: UROLOGY

## 2018-04-09 PROCEDURE — 99213 OFFICE O/P EST LOW 20 MIN: CPT | Mod: 25 | Performed by: UROLOGY

## 2018-04-09 RX ORDER — CIPROFLOXACIN 500 MG/1
500 TABLET, FILM COATED ORAL ONCE
Qty: 1 TABLET | Refills: 0 | Status: SHIPPED | OUTPATIENT
Start: 2018-04-09 | End: 2019-02-04

## 2018-04-09 ASSESSMENT — PAIN SCALES - GENERAL: PAINLEVEL: NO PAIN (0)

## 2018-04-09 NOTE — LETTER
4/9/2018       RE: Kushal Bush  [address]  [facility] [address]     Dear Colleague,    Thank you for referring your patient, Kushal Bush, to the [facility] [place] at [facility]. Please see a copy of my visit note below.    Office Visit Note  [facility]  [phone]    UROLOGIC DIAGNOSES:   Enlarged prostate and difficulty urinating, nocturia    CURRENT INTERVENTIONS:   Flomax    HISTORY:   Ed returns to clinic today for evaluation with a cystoscopy. He continues to complain mainly of a slow urinary stream      PAST MEDICAL HISTORY:   Past Medical History:   Diagnosis Date     Asthma      Benign non-nodular prostatic hyperplasia without lower urinary tract symptoms 7/18/2016     BPH (benign prostatic hyperplasia) 9/14/2011     Campylobacter diarrhea 7/18/2016     CMC arthritis, thumb, degenerative 11/25/2013     Diabetes type 2, uncontrolled (H) 7/18/2016     Essential hypertension with goal blood pressure less than 140/90 7/18/2016     Fatigue, unspecified type 7/18/2016     Heart palpitations 9/14/2011     HTN (hypertension) 9/14/2011     HTN, goal below 140/90 1/4/2012     Hyperglycemia 10/27/2011     Hyperlipidemia LDL goal <100 10/27/2011     Impacted cerumen 9/14/2011     Intermittent asthma 9/14/2011     Lumbar radiculopathy 11/7/2011     Mumps      Palpitations      Post-nasal drainage 9/14/2011     Presbycusis 9/14/2011     Seborrheic keratoses 9/14/2011     Tubular adenoma of colon      Type 2 diabetes mellitus without complications (H) 10/12/2015     Type 2 diabetes, HbA1c goal < 7% (H) 1/6/2012     Type II diabetes mellitus with HbA1C goal between 7 and 8 6/18/2013     Uncontrolled type 2 diabetes mellitus without complication, without long-term current use of insulin (H) 12/27/2016       PAST SURGICAL HISTORY:   Past Surgical History:   Procedure Laterality Date     COLONOSCOPY  1/29/14      COLONOSCOPY  1/29/2014    Procedure: COMBINED COLONOSCOPY, SINGLE BIOPSY/POLYPECTOMY BY BIOPSY;  COLONOSCOPY two polyps with jumbo forceps;  Surgeon: Alina Hopkins MD;  Location:  GI     EYE SURGERY Right 12/14/16    cataract removal      ingrown toenail         FAMILY HISTORY:   Family History   Problem Relation Age of Onset     CANCER Mother      age 89, uterine/breast     HEART DISEASE Father      51       SOCIAL HISTORY:   Social History   Substance Use Topics     Smoking status: Never Smoker     Smokeless tobacco: Never Used     Alcohol use Yes      Comment: occas.         Current Outpatient Prescriptions   Medication     glipiZIDE (GLUCOTROL) 10 MG tablet     fluticasone (FLONASE) 50 MCG/ACT spray     tamsulosin (FLOMAX) 0.4 MG capsule     tamsulosin (FLOMAX) 0.4 MG capsule     doxazosin (CARDURA) 4 MG tablet     pravastatin (PRAVACHOL) 20 MG tablet     glipiZIDE (GLUCOTROL) 10 MG tablet     blood glucose monitoring (TRUETRACK TEST) test strip     lisinopril (PRINIVIL/ZESTRIL) 5 MG tablet     metoprolol (LOPRESSOR) 25 MG tablet     aspirin 81 MG tablet     terbinafine (LAMISIL) 250 MG tablet     No current facility-administered medications for this visit.          PHYSICAL EXAM:    There were no vitals taken for this visit.    HEENT: Normocephalic and atraumatic   Cardiac: Not done  Back/Flank: Not done  CNS/PNS: Not done  Respiratory: Normal non-labored breathing  Abdomen: Soft nontender and nondistended  Peripheral Vascular: Not done  Mental Status: Not done    Penis: Not done  Scrotal Skin: Not done  Testicles: Not done  Epididymis: Not done  Digital Rectal Exam:     Cystoscopy: I performed flexible cystoscopy today and the bladder was normal throughout. On retroflexion of the scope. A large intravesical segment of the prostate, it was mainly the right lateral segment that wrapped around posteriorly as well. On removal of the scope he had kissing lateral lobes as well.    Imaging:  None    Urinalysis: UA RESULTS:  Recent Labs   Lab Test  04/09/18   1110   04/30/12   1222   COLOR  Yellow   < >  Yellow   APPEARANCE  Clear   < >  Clear   URINEGLC  500*   < >  500*   URINEBILI  Negative   < >  Negative   URINEKETONE  Trace*   < >  Trace*   SG  1.020   < >  1.025   UBLD  Negative   < >  Trace*   URINEPH  5.5   < >  5.5   PROTEIN  Negative   < >  Negative   UROBILINOGEN  0.2   < >  0.2   NITRITE  Negative   < >  Negative   LEUKEST  Negative   < >  Negative   RBCU   --    --   O - 2   WBCU   --    --   O - 2    < > = values in this interval not displayed.       PSA: 1.56    Post Void Residual:     Other labs: None today      IMPRESSION:  Enlarged prostate, slow urinary stream    PLAN:  We discussed treatment options for his enlarged prostate. We discussed other medical treatment options as well as surgical treatment options. He would like to pursue surgery. I recommended that he undergo a cystoscopy with transurethral resection of the prostate using the Quanta laser. I discussed the procedure in detail today  along with its risks and expected recovery. He wishes to proceed. We will get him scheduled in the operating room in the near future.     Total Time: 20 minutes                                      Total in Consultation: 15 minutes    Dejuan Felix M.D.

## 2018-04-09 NOTE — PROGRESS NOTES
Office Visit Note  [facility]  [phone]    UROLOGIC DIAGNOSES:   Enlarged prostate and difficulty urinating, nocturia    CURRENT INTERVENTIONS:   Flomax    HISTORY:   Ed returns to clinic today for evaluation with a cystoscopy. He continues to complain mainly of a slow urinary stream      PAST MEDICAL HISTORY:   Past Medical History:   Diagnosis Date     Asthma      Benign non-nodular prostatic hyperplasia without lower urinary tract symptoms 7/18/2016     BPH (benign prostatic hyperplasia) 9/14/2011     Campylobacter diarrhea 7/18/2016     CMC arthritis, thumb, degenerative 11/25/2013     Diabetes type 2, uncontrolled (H) 7/18/2016     Essential hypertension with goal blood pressure less than 140/90 7/18/2016     Fatigue, unspecified type 7/18/2016     Heart palpitations 9/14/2011     HTN (hypertension) 9/14/2011     HTN, goal below 140/90 1/4/2012     Hyperglycemia 10/27/2011     Hyperlipidemia LDL goal <100 10/27/2011     Impacted cerumen 9/14/2011     Intermittent asthma 9/14/2011     Lumbar radiculopathy 11/7/2011     Mumps      Palpitations      Post-nasal drainage 9/14/2011     Presbycusis 9/14/2011     Seborrheic keratoses 9/14/2011     Tubular adenoma of colon      Type 2 diabetes mellitus without complications (H) 10/12/2015     Type 2 diabetes, HbA1c goal < 7% (H) 1/6/2012     Type II diabetes mellitus with HbA1C goal between 7 and 8 6/18/2013     Uncontrolled type 2 diabetes mellitus without complication, without long-term current use of insulin (H) 12/27/2016       PAST SURGICAL HISTORY:   Past Surgical History:   Procedure Laterality Date     COLONOSCOPY  1/29/14     COLONOSCOPY  1/29/2014    Procedure: COMBINED COLONOSCOPY, SINGLE BIOPSY/POLYPECTOMY BY BIOPSY;  COLONOSCOPY two polyps with jumbo forceps;  Surgeon: Alina Hopkins MD;  Location:  GI     EYE SURGERY Right 12/14/16    cataract removal      ingrown toenail         FAMILY HISTORY:   Family History   Problem  Relation Age of Onset     CANCER Mother      age 89, uterine/breast     HEART DISEASE Father      51       SOCIAL HISTORY:   Social History   Substance Use Topics     Smoking status: Never Smoker     Smokeless tobacco: Never Used     Alcohol use Yes      Comment: occas.         Current Outpatient Prescriptions   Medication     glipiZIDE (GLUCOTROL) 10 MG tablet     fluticasone (FLONASE) 50 MCG/ACT spray     tamsulosin (FLOMAX) 0.4 MG capsule     tamsulosin (FLOMAX) 0.4 MG capsule     doxazosin (CARDURA) 4 MG tablet     pravastatin (PRAVACHOL) 20 MG tablet     glipiZIDE (GLUCOTROL) 10 MG tablet     blood glucose monitoring (TRUETRACK TEST) test strip     lisinopril (PRINIVIL/ZESTRIL) 5 MG tablet     metoprolol (LOPRESSOR) 25 MG tablet     aspirin 81 MG tablet     terbinafine (LAMISIL) 250 MG tablet     No current facility-administered medications for this visit.          PHYSICAL EXAM:    There were no vitals taken for this visit.    HEENT: Normocephalic and atraumatic   Cardiac: Not done  Back/Flank: Not done  CNS/PNS: Not done  Respiratory: Normal non-labored breathing  Abdomen: Soft nontender and nondistended  Peripheral Vascular: Not done  Mental Status: Not done    Penis: Not done  Scrotal Skin: Not done  Testicles: Not done  Epididymis: Not done  Digital Rectal Exam:     Cystoscopy: I performed flexible cystoscopy today and the bladder was normal throughout. On retroflexion of the scope. A large intravesical segment of the prostate, it was mainly the right lateral segment that wrapped around posteriorly as well. On removal of the scope he had kissing lateral lobes as well.    Imaging: None    Urinalysis: UA RESULTS:  Recent Labs   Lab Test  04/09/18   1110   04/30/12   1222   COLOR  Yellow   < >  Yellow   APPEARANCE  Clear   < >  Clear   URINEGLC  500*   < >  500*   URINEBILI  Negative   < >  Negative   URINEKETONE  Trace*   < >  Trace*   SG  1.020   < >  1.025   UBLD  Negative   < >  Trace*   URINEPH  5.5   <  >  5.5   PROTEIN  Negative   < >  Negative   UROBILINOGEN  0.2   < >  0.2   NITRITE  Negative   < >  Negative   LEUKEST  Negative   < >  Negative   RBCU   --    --   O - 2   WBCU   --    --   O - 2    < > = values in this interval not displayed.       PSA: 1.56    Post Void Residual:     Other labs: None today      IMPRESSION:  Enlarged prostate, slow urinary stream    PLAN:  We discussed treatment options for his enlarged prostate. We discussed other medical treatment options as well as surgical treatment options. He would like to pursue surgery. I recommended that he undergo a cystoscopy with transurethral resection of the prostate using the Quanta laser. I discussed the procedure in detail today  along with its risks and expected recovery. He wishes to proceed. We will get him scheduled in the operating room in the near future.     Total Time: 20 minutes                                      Total in Consultation: 15 minutes    Dejuan Felix M.D.

## 2018-04-09 NOTE — MR AVS SNAPSHOT
"              After Visit Summary   4/9/2018    Kushal Bush    MRN: [id number]           Patient Information     Date Of Birth          1945        Visit Information        Provider Department      4/9/2018 11:00 AM Dejuan Felix MD; UB CYF [facility] [place]        Today's Diagnoses     BPH (benign prostatic hyperplasia)          Care Instructions         AFTER YOUR CYSTOSCOPY         You have just completed a cystoscopy, or \"cysto\", which allowed your physician to learn more about your bladder (or to remove a stent placed after surgery). We suggest that you continue to avoid caffeine, fruit juice, and alcohol for the next 24 hours, however, you are encouraged to return to your normal activities.       A few things that are considered normal after your cystoscopy:    * small amount of bleeding (or spotting) that clears within the next 24 hours    * slight burning sensation with urination    * sensation to of needing to avoid more frequently    * the feeling of \"air\" in your urine    * mild discomfort that is relieved with Acetaminophen (Example:Tylenol)        Please contact our office promptly if you:    * develop a fever above 101 degrees    * are unable to urinate, during non-office hours seek Medical Attention.             Follow-ups after your visit        Who to contact     If you have questions or need follow up information about today's clinic visit or your schedule please contact [facility] [place] directly at [phone].  Normal or non-critical lab and imaging results will be communicated to you by MyChart, letter or phone within 4 business days after the clinic has received the results. If you do not hear from us within 7 days, please contact the clinic through MyChart or phone. If you have a critical or abnormal lab result, we will notify you by phone as soon as possible.  Submit refill requests through " "MyChart or call your pharmacy and they will forward the refill request to us. Please allow 3 business days for your refill to be completed.          Additional Information About Your Visit        MyChart Information     [facility] gives you secure access to your electronic health record. If you see a primary care provider, you can also send messages to your care team and make appointments. If you have questions, please call your primary care clinic.  If you do not have a primary care provider, please call [phone] and they will assist you.        Care EveryWhere ID     This is your Care EveryWhere ID. This could be used by other organizations to access your [facility] medical records  [id number]        Your Vitals Were     Pulse Height BMI (Body Mass Index)             72 1.727 m (5' 8\") 25.09 kg/m2          Blood Pressure from Last 3 Encounters:   04/09/18 120/66   10/16/17 110/64   10/10/17 104/61    Weight from Last 3 Encounters:   04/09/18 74.8 kg (165 lb)   03/26/18 78.5 kg (173 lb)   10/16/17 78.5 kg (173 lb)              We Performed the Following     UA without Microscopic          Today's Medication Changes          These changes are accurate as of 4/9/18 11:36 AM.  If you have any questions, ask your nurse or doctor.               Start taking these medicines.        Dose/Directions    ciprofloxacin 500 MG tablet   Commonly known as:  CIPRO   Used for:  BPH (benign prostatic hyperplasia)   Started by:  Dejuan Felix MD        Dose:  500 mg   Take 1 tablet (500 mg) by mouth once for 1 dose   Quantity:  1 tablet   Refills:  0            Where to get your medicines      These medications were sent to [facility] [address] [place] [address] [address] AT [place] [address]  [address] [facility] [address]    Hours:  24-hours Phone:  [phone]     ciprofloxacin 500 MG tablet                Primary Care Provider Office Phone # Fax #    Albert Huggins MD " [phone]       [address]  [facility] [address]        Equal Access to Services     CHELA ARREOLA : Hadamina aad ku hadrocío Us, warachnada lutheodora, qasrita kaalmada ez, desmond masseyarminda ashlie López [facility] [phone].    ATENCIÓN: Si habla español, tiene a chicas disposición servicios gratuitos de asistencia lingüística. Llame al [phone].    We comply with applicable federal civil rights laws and [place] laws. We do not discriminate on the basis of race, color, national origin, age, disability, sex, sexual orientation, or gender identity.            Thank you!     Thank you for choosing [facility] [place]  for your care. Our goal is always to provide you with excellent care. Hearing back from our patients is one way we can continue to improve our services. Please take a few minutes to complete the written survey that you may receive in the mail after your visit with us. Thank you!             Your Updated Medication List - Protect others around you: Learn how to safely use, store and throw away your medicines at www.disposemymeds.org.          This list is accurate as of 4/9/18 11:36 AM.  Always use your most recent med list.                   Brand Name Dispense Instructions for use Diagnosis    aspirin 81 MG tablet     30 tablet    Take 1 tablet (81 mg) by mouth daily    Atypical chest pain       blood glucose monitoring test strip    TRUETRACK TEST    100 each    Use to test blood sugar 2 times daily or as directed.  Ok to substitute alternative if insurance prefers.    Uncontrolled type 2 diabetes mellitus without complication, without long-term current use of insulin (H)       ciprofloxacin 500 MG tablet    CIPRO    1 tablet    Take 1 tablet (500 mg) by mouth once for 1 dose    BPH (benign prostatic hyperplasia)       doxazosin 4 MG tablet    CARDURA    90 tablet    Take 1 tablet (4 mg) by mouth At Bedtime    Essential  hypertension with goal blood pressure less than 140/90, Benign non-nodular prostatic hyperplasia without lower urinary tract symptoms       fluticasone 50 MCG/ACT spray    FLONASE    16 g    Spray 1-2 sprays into both nostrils daily    Throat pain       * glipiZIDE 10 MG tablet    GLUCOTROL    180 tablet    Take 1 tablet (10 mg) by mouth 2 times daily (before meals)    Uncontrolled type 2 diabetes mellitus without complication, without long-term current use of insulin (H), Benign non-nodular prostatic hyperplasia without lower urinary tract symptoms, Essential hypertension with goal blood pressure less than 140/90, Atypical chest pain, Hyperlipidemia LDL goal <100       * glipiZIDE 10 MG tablet    GLUCOTROL    180 tablet    Take 1 tablet (10 mg) by mouth 2 times daily (before meals)    Uncontrolled type 2 diabetes mellitus without complication, without long-term current use of insulin (H), Benign non-nodular prostatic hyperplasia without lower urinary tract symptoms, Essential hypertension with goal blood pressure less than 140/90, Atypical chest pain, Hyperlipidemia LDL goal <100       lisinopril 5 MG tablet    PRINIVIL/ZESTRIL    90 tablet    Take 1 tablet (5 mg) by mouth daily    Essential hypertension with goal blood pressure less than 140/90       metoprolol tartrate 25 MG tablet    LOPRESSOR    45 tablet    Take 0.5 tablets (12.5 mg) by mouth daily    Atypical chest pain, Essential hypertension with goal blood pressure less than 140/90       pravastatin 20 MG tablet    PRAVACHOL    90 tablet    TAKE 1 TABLET(20 MG) BY MOUTH DAILY    Hyperlipidemia LDL goal <100       * tamsulosin 0.4 MG capsule    FLOMAX    180 capsule    Take 2 capsules (0.8 mg) by mouth daily    Benign non-nodular prostatic hyperplasia without lower urinary tract symptoms       * tamsulosin 0.4 MG capsule    FLOMAX    90 capsule    Take 1 capsule (0.4 mg) by mouth daily    Benign non-nodular prostatic hyperplasia without lower urinary tract  symptoms       terbinafine 250 MG tablet    lamISIL    90 tablet    Take 1 tablet (250 mg) by mouth daily    Onychomycosis       * Notice:  This list has 4 medication(s) that are the same as other medications prescribed for you. Read the directions carefully, and ask your doctor or other care provider to review them with you.

## 2018-04-09 NOTE — NURSING NOTE
Prior to the start of the procedure and with procedural staff participation, I verbally confirmed the patient s identity using two indicators, relevant allergies, that the procedure was appropriate and matched the consent or emergent situation, and that the correct equipment/implants were available. Immediately prior to starting the procedure I conducted the Time Out with the procedural staff and re-confirmed the patient s name, procedure, and site/side. (The Joint Commission universal protocol was followed.)  Yes    Sedation (Moderate or Deep): None   Patient was draped in sterile fashion after betadine prep. 2% Lidocaine gel instilled into urethra . Jannet Heredia LPN

## 2018-04-23 ENCOUNTER — TRANSFERRED RECORDS (OUTPATIENT)
Dept: HEALTH INFORMATION MANAGEMENT | Facility: CLINIC | Age: 73
End: 2018-04-23

## 2018-05-01 DIAGNOSIS — N40.0 BENIGN NON-NODULAR PROSTATIC HYPERPLASIA WITHOUT LOWER URINARY TRACT SYMPTOMS: ICD-10-CM

## 2018-05-02 RX ORDER — TAMSULOSIN HYDROCHLORIDE 0.4 MG/1
CAPSULE ORAL
Qty: 180 CAPSULE | Refills: 0 | Status: SHIPPED | OUTPATIENT
Start: 2018-05-02 | End: 2018-08-06

## 2018-05-02 NOTE — TELEPHONE ENCOUNTER
Please clarify what dose pt is to take.  He has 2 scripts on his med list that were prescribed on the same day  Luis Yost RN, BSN

## 2018-05-02 NOTE — TELEPHONE ENCOUNTER
"Last Written Prescription Date:  10/10/17  Last Fill Quantity: 180 capsule,  # refills: 1   Last office visit: 10/10/2017 with prescribing provider:  Joseluis   Future Office Visit:      Requested Prescriptions   Pending Prescriptions Disp Refills     tamsulosin (FLOMAX) 0.4 MG capsule [Pharmacy Med Name: TAMSULOSIN 0.4MG CAPSULES] 180 capsule 0     Sig: TAKE 2 CAPSULES(0.8 MG) BY MOUTH DAILY    Alpha Blockers Passed    5/1/2018  6:10 PM       Passed - Blood pressure under 140/90 in past 12 months    BP Readings from Last 3 Encounters:   04/09/18 120/66   10/16/17 110/64   10/10/17 104/61                Passed - Recent (12 mo) or future (30 days) visit within the authorizing provider's specialty    Patient had office visit in the last 12 months or has a visit in the next 30 days with authorizing provider or within the authorizing provider's specialty.  See \"Patient Info\" tab in inbasket, or \"Choose Columns\" in Meds & Orders section of the refill encounter.           Passed - Patient does not have Tadalafil, Vardenafil, or Sildenafil on their medication list       Passed - Patient is 18 years of age or older          "

## 2018-06-20 ENCOUNTER — OFFICE VISIT (OUTPATIENT)
Dept: UROLOGY | Facility: CLINIC | Age: 73
End: 2018-06-20
Payer: MEDICARE

## 2018-06-20 VITALS — HEART RATE: 78 BPM | OXYGEN SATURATION: 94 % | WEIGHT: 165 LBS | BODY MASS INDEX: 25.9 KG/M2 | HEIGHT: 67 IN

## 2018-06-20 DIAGNOSIS — N40.0 ENLARGED PROSTATE: Primary | ICD-10-CM

## 2018-06-20 PROCEDURE — 99214 OFFICE O/P EST MOD 30 MIN: CPT | Performed by: UROLOGY

## 2018-06-20 ASSESSMENT — PAIN SCALES - GENERAL: PAINLEVEL: NO PAIN (0)

## 2018-06-20 NOTE — LETTER
6/20/2018       RE: Kushal Bush  [address]  [facility] [address]     Dear Colleague,    Thank you for referring your patient, Kushal Bush, to the [facility] [place] at [facility]. Please see a copy of my visit note below.    Office Visit Note  [facility]  [phone]    UROLOGIC DIAGNOSES:   Enlarged prostate and difficulty urinating, nocturia    CURRENT INTERVENTIONS:   Flomax    HISTORY:   Ed returns to clinic today with additional questions about surgical treatment options. He is strongly considering undergoing a photo vaporization of the prostate using the Quanta laser but he had a few questions today about recovery and risks of the procedure.      PAST MEDICAL HISTORY:   Past Medical History:   Diagnosis Date     Asthma      Benign non-nodular prostatic hyperplasia without lower urinary tract symptoms 7/18/2016     BPH (benign prostatic hyperplasia) 9/14/2011     Campylobacter diarrhea 7/18/2016     CMC arthritis, thumb, degenerative 11/25/2013     Diabetes type 2, uncontrolled (H) 7/18/2016     Essential hypertension with goal blood pressure less than 140/90 7/18/2016     Fatigue, unspecified type 7/18/2016     Heart palpitations 9/14/2011     HTN (hypertension) 9/14/2011     HTN, goal below 140/90 1/4/2012     Hyperglycemia 10/27/2011     Hyperlipidemia LDL goal <100 10/27/2011     Impacted cerumen 9/14/2011     Intermittent asthma 9/14/2011     Lumbar radiculopathy 11/7/2011     Mumps      Palpitations      Post-nasal drainage 9/14/2011     Presbycusis 9/14/2011     Seborrheic keratoses 9/14/2011     Tubular adenoma of colon      Type 2 diabetes mellitus without complications (H) 10/12/2015     Type 2 diabetes, HbA1c goal < 7% (H) 1/6/2012     Type II diabetes mellitus with HbA1C goal between 7 and 8 6/18/2013     Uncontrolled type 2 diabetes mellitus without complication, without long-term  current use of insulin (H) 12/27/2016       PAST SURGICAL HISTORY:   Past Surgical History:   Procedure Laterality Date     COLONOSCOPY  1/29/14     COLONOSCOPY  1/29/2014    Procedure: COMBINED COLONOSCOPY, SINGLE BIOPSY/POLYPECTOMY BY BIOPSY;  COLONOSCOPY two polyps with jumbo forceps;  Surgeon: Alina Hopkins MD;  Location:  GI     EYE SURGERY Right 12/14/16    cataract removal      ingrown toenail         FAMILY HISTORY:   Family History   Problem Relation Age of Onset     Cancer Mother      age 89, uterine/breast     HEART DISEASE Father      51       SOCIAL HISTORY:   Social History   Substance Use Topics     Smoking status: Never Smoker     Smokeless tobacco: Never Used     Alcohol use Yes      Comment: occas.         Current Outpatient Prescriptions   Medication     aspirin 81 MG tablet     blood glucose monitoring (TRUETRACK TEST) test strip     doxazosin (CARDURA) 4 MG tablet     fluticasone (FLONASE) 50 MCG/ACT spray     glipiZIDE (GLUCOTROL) 10 MG tablet     glipiZIDE (GLUCOTROL) 10 MG tablet     lisinopril (PRINIVIL/ZESTRIL) 5 MG tablet     metoprolol (LOPRESSOR) 25 MG tablet     pravastatin (PRAVACHOL) 20 MG tablet     tamsulosin (FLOMAX) 0.4 MG capsule     terbinafine (LAMISIL) 250 MG tablet     No current facility-administered medications for this visit.          PHYSICAL EXAM:    There were no vitals taken for this visit.    HEENT: Normocephalic and atraumatic   Cardiac: Not done  Back/Flank: Not done  CNS/PNS: Not done  Respiratory: Normal non-labored breathing  Abdomen: Soft nontender and nondistended  Peripheral Vascular: Not done  Mental Status: Not done    Penis: Not done  Scrotal Skin: Not done  Testicles: Not done  Epididymis: Not done  Digital Rectal Exam:     Cystoscopy: Not done    Imaging: None    Urinalysis: UA RESULTS:  Recent Labs   Lab Test  04/09/18   1110   04/30/12   1222   COLOR  Yellow   < >  Yellow   APPEARANCE  Clear   < >  Clear   URINEGLC  500*   < >  500*    URINEBILI  Negative   < >  Negative   URINEKETONE  Trace*   < >  Trace*   SG  1.020   < >  1.025   UBLD  Negative   < >  Trace*   URINEPH  5.5   < >  5.5   PROTEIN  Negative   < >  Negative   UROBILINOGEN  0.2   < >  0.2   NITRITE  Negative   < >  Negative   LEUKEST  Negative   < >  Negative   RBCU   --    --   O - 2   WBCU   --    --   O - 2    < > = values in this interval not displayed.       PSA: 1.56    Post Void Residual:     Other labs: None today      IMPRESSION:  Enlarged prostate    PLAN:  We discussed again today his treatment options including medical and surgical treatment options. He does not wish to add finasteride to his treatment regimen. We discussed his various surgical treatment options but he would like to proceed with a photo vaporization of the prostate using the Quanta laser. I discussed the procedure detail today along with its risks and expected recovery. All of his questions were answered. He has a trip to the [place] [employer] planned for this summer so he will likely proceed with surgery in the fall. He will contact us to schedule his surgery.      Total Time: 25 minutes                                      Total in Consultation: 25 minutes    Dejuan Felix M.D.

## 2018-06-20 NOTE — MR AVS SNAPSHOT
"              After Visit Summary   6/20/2018    Kushal Bush    MRN: [id number]           Patient Information     Date Of Birth          1945        Visit Information        Provider Department      6/20/2018 9:30 AM Dejuan Felix MD [facility] [place]        Today's Diagnoses     Enlarged prostate    -  1       Follow-ups after your visit        Follow-up notes from your care team     Return for Schedule surgery.      Who to contact     If you have questions or need follow up information about today's clinic visit or your schedule please contact [facility] [place] directly at [phone].  Normal or non-critical lab and imaging results will be communicated to you by Genomindhart, letter or phone within 4 business days after the clinic has received the results. If you do not hear from us within 7 days, please contact the clinic through Plaid inct or phone. If you have a critical or abnormal lab result, we will notify you by phone as soon as possible.  Submit refill requests through FreeMonee or call your pharmacy and they will forward the refill request to us. Please allow 3 business days for your refill to be completed.          Additional Information About Your Visit        MyChart Information     FreeMonee gives you secure access to your electronic health record. If you see a primary care provider, you can also send messages to your care team and make appointments. If you have questions, please call your primary care clinic.  If you do not have a primary care provider, please call [phone] and they will assist you.        Care EveryWhere ID     This is your Care EveryWhere ID. This could be used by other organizations to access your [facility] medical records  [id number]        Your Vitals Were     Pulse Height Pulse Oximetry BMI (Body Mass Index)          78 1.702 m (5' 7\") 94% 25.84 kg/m2         Blood Pressure from Last 3 " Encounters:   04/09/18 120/66   10/16/17 110/64   10/10/17 104/61    Weight from Last 3 Encounters:   06/20/18 74.8 kg (165 lb)   04/09/18 74.8 kg (165 lb)   03/26/18 78.5 kg (173 lb)              Today, you had the following     No orders found for display       Primary Care Provider Office Phone # Fax #    Albert Huggins [phone] [address] [facility] [address]        Equal Access to Services     [facility]: Hadii aad ku hadasho Soomaali, waaxda luqadaha, qaybta kaalmada adeegyada, waxhenry herring haymarlee peoples . So [facility] [phone].    ATENCIÓN: Si habla español, tiene a chicas disposición servicios gratuitos de asistencia lingüística. [facility] [phone].    We comply with applicable federal civil rights laws and [place] laws. We do not discriminate on the basis of race, color, national origin, age, disability, sex, sexual orientation, or gender identity.            Thank you!     Thank you for choosing [facility] [place]  for your care. Our goal is always to provide you with excellent care. Hearing back from our patients is one way we can continue to improve our services. Please take a few minutes to complete the written survey that you may receive in the mail after your visit with us. Thank you!             Your Updated Medication List - Protect others around you: Learn how to safely use, store and throw away your medicines at www.disposemymeds.org.          This list is accurate as of 6/20/18  9:47 AM.  Always use your most recent med list.                   Brand Name Dispense Instructions for use Diagnosis    aspirin 81 MG tablet     30 tablet    Take 1 tablet (81 mg) by mouth daily    Atypical chest pain       blood glucose monitoring test strip    TRUETRACK TEST    100 each    Use to test blood sugar 2 times daily or as directed.  Ok to substitute alternative if insurance prefers.    Uncontrolled type 2 diabetes mellitus  without complication, without long-term current use of insulin (H)       doxazosin 4 MG tablet    CARDURA    90 tablet    Take 1 tablet (4 mg) by mouth At Bedtime    Essential hypertension with goal blood pressure less than 140/90, Benign non-nodular prostatic hyperplasia without lower urinary tract symptoms       fluticasone 50 MCG/ACT spray    FLONASE    16 g    Spray 1-2 sprays into both nostrils daily    Throat pain       * glipiZIDE 10 MG tablet    GLUCOTROL    180 tablet    Take 1 tablet (10 mg) by mouth 2 times daily (before meals)    Uncontrolled type 2 diabetes mellitus without complication, without long-term current use of insulin (H), Benign non-nodular prostatic hyperplasia without lower urinary tract symptoms, Essential hypertension with goal blood pressure less than 140/90, Atypical chest pain, Hyperlipidemia LDL goal <100       * glipiZIDE 10 MG tablet    GLUCOTROL    180 tablet    Take 1 tablet (10 mg) by mouth 2 times daily (before meals)    Uncontrolled type 2 diabetes mellitus without complication, without long-term current use of insulin (H), Benign non-nodular prostatic hyperplasia without lower urinary tract symptoms, Essential hypertension with goal blood pressure less than 140/90, Atypical chest pain, Hyperlipidemia LDL goal <100       lisinopril 5 MG tablet    PRINIVIL/ZESTRIL    90 tablet    Take 1 tablet (5 mg) by mouth daily    Essential hypertension with goal blood pressure less than 140/90       metoprolol tartrate 25 MG tablet    LOPRESSOR    45 tablet    Take 0.5 tablets (12.5 mg) by mouth daily    Atypical chest pain, Essential hypertension with goal blood pressure less than 140/90       pravastatin 20 MG tablet    PRAVACHOL    90 tablet    TAKE 1 TABLET(20 MG) BY MOUTH DAILY    Hyperlipidemia LDL goal <100       tamsulosin 0.4 MG capsule    FLOMAX    180 capsule    TAKE 2 CAPSULES(0.8 MG) BY MOUTH DAILY    Benign non-nodular prostatic hyperplasia without lower urinary tract symptoms        terbinafine 250 MG tablet    lamISIL    90 tablet    Take 1 tablet (250 mg) by mouth daily    Onychomycosis       * Notice:  This list has 2 medication(s) that are the same as other medications prescribed for you. Read the directions carefully, and ask your doctor or other care provider to review them with you.

## 2018-06-20 NOTE — PROGRESS NOTES
Office Visit Note  [facility]  [phone]    UROLOGIC DIAGNOSES:   Enlarged prostate and difficulty urinating, nocturia    CURRENT INTERVENTIONS:   Flomax    HISTORY:   Ed returns to clinic today with additional questions about surgical treatment options. He is strongly considering undergoing a photo vaporization of the prostate using the Quanta laser but he had a few questions today about recovery and risks of the procedure.      PAST MEDICAL HISTORY:   Past Medical History:   Diagnosis Date     Asthma      Benign non-nodular prostatic hyperplasia without lower urinary tract symptoms 7/18/2016     BPH (benign prostatic hyperplasia) 9/14/2011     Campylobacter diarrhea 7/18/2016     CMC arthritis, thumb, degenerative 11/25/2013     Diabetes type 2, uncontrolled (H) 7/18/2016     Essential hypertension with goal blood pressure less than 140/90 7/18/2016     Fatigue, unspecified type 7/18/2016     Heart palpitations 9/14/2011     HTN (hypertension) 9/14/2011     HTN, goal below 140/90 1/4/2012     Hyperglycemia 10/27/2011     Hyperlipidemia LDL goal <100 10/27/2011     Impacted cerumen 9/14/2011     Intermittent asthma 9/14/2011     Lumbar radiculopathy 11/7/2011     Mumps      Palpitations      Post-nasal drainage 9/14/2011     Presbycusis 9/14/2011     Seborrheic keratoses 9/14/2011     Tubular adenoma of colon      Type 2 diabetes mellitus without complications (H) 10/12/2015     Type 2 diabetes, HbA1c goal < 7% (H) 1/6/2012     Type II diabetes mellitus with HbA1C goal between 7 and 8 6/18/2013     Uncontrolled type 2 diabetes mellitus without complication, without long-term current use of insulin (H) 12/27/2016       PAST SURGICAL HISTORY:   Past Surgical History:   Procedure Laterality Date     COLONOSCOPY  1/29/14     COLONOSCOPY  1/29/2014    Procedure: COMBINED COLONOSCOPY, SINGLE BIOPSY/POLYPECTOMY BY BIOPSY;  COLONOSCOPY two polyps with jumbo forceps;  Surgeon: Alina Hopkins MD;   Location:  GI     EYE SURGERY Right 12/14/16    cataract removal      ingrown toenail         FAMILY HISTORY:   Family History   Problem Relation Age of Onset     Cancer Mother      age 89, uterine/breast     HEART DISEASE Father      51       SOCIAL HISTORY:   Social History   Substance Use Topics     Smoking status: Never Smoker     Smokeless tobacco: Never Used     Alcohol use Yes      Comment: occas.         Current Outpatient Prescriptions   Medication     aspirin 81 MG tablet     blood glucose monitoring (TRUETRACK TEST) test strip     doxazosin (CARDURA) 4 MG tablet     fluticasone (FLONASE) 50 MCG/ACT spray     glipiZIDE (GLUCOTROL) 10 MG tablet     glipiZIDE (GLUCOTROL) 10 MG tablet     lisinopril (PRINIVIL/ZESTRIL) 5 MG tablet     metoprolol (LOPRESSOR) 25 MG tablet     pravastatin (PRAVACHOL) 20 MG tablet     tamsulosin (FLOMAX) 0.4 MG capsule     terbinafine (LAMISIL) 250 MG tablet     No current facility-administered medications for this visit.          PHYSICAL EXAM:    There were no vitals taken for this visit.    HEENT: Normocephalic and atraumatic   Cardiac: Not done  Back/Flank: Not done  CNS/PNS: Not done  Respiratory: Normal non-labored breathing  Abdomen: Soft nontender and nondistended  Peripheral Vascular: Not done  Mental Status: Not done    Penis: Not done  Scrotal Skin: Not done  Testicles: Not done  Epididymis: Not done  Digital Rectal Exam:     Cystoscopy: Not done    Imaging: None    Urinalysis: UA RESULTS:  Recent Labs   Lab Test  04/09/18   1110   04/30/12   1222   COLOR  Yellow   < >  Yellow   APPEARANCE  Clear   < >  Clear   URINEGLC  500*   < >  500*   URINEBILI  Negative   < >  Negative   URINEKETONE  Trace*   < >  Trace*   SG  1.020   < >  1.025   UBLD  Negative   < >  Trace*   URINEPH  5.5   < >  5.5   PROTEIN  Negative   < >  Negative   UROBILINOGEN  0.2   < >  0.2   NITRITE  Negative   < >  Negative   LEUKEST  Negative   < >  Negative   RBCU   --    --   O - 2   WBCU   --     --   O - 2    < > = values in this interval not displayed.       PSA: 1.56    Post Void Residual:     Other labs: None today      IMPRESSION:  Enlarged prostate    PLAN:  We discussed again today his treatment options including medical and surgical treatment options. He does not wish to add finasteride to his treatment regimen. We discussed his various surgical treatment options but he would like to proceed with a photo vaporization of the prostate using the Quanta laser. I discussed the procedure detail today along with its risks and expected recovery. All of his questions were answered. He has a trip to the [place] [employer] planned for this summer so he will likely proceed with surgery in the fall. He will contact us to schedule his surgery.      Total Time: 25 minutes                                      Total in Consultation: 25 minutes    Dejuan Felix M.D.

## 2018-08-06 DIAGNOSIS — I10 ESSENTIAL HYPERTENSION WITH GOAL BLOOD PRESSURE LESS THAN 140/90: ICD-10-CM

## 2018-08-06 DIAGNOSIS — R07.89 ATYPICAL CHEST PAIN: ICD-10-CM

## 2018-08-06 DIAGNOSIS — N40.0 BENIGN NON-NODULAR PROSTATIC HYPERPLASIA WITHOUT LOWER URINARY TRACT SYMPTOMS: ICD-10-CM

## 2018-08-06 NOTE — TELEPHONE ENCOUNTER
"Requested Prescriptions   Pending Prescriptions Disp Refills     tamsulosin (FLOMAX) 0.4 MG capsule [Pharmacy Med Name: TAMSULOSIN 0.4MG CAPSULES]  Last Written Prescription Date:  5/2/2018  Last Fill Quantity: 180 capsule,  # refills: 0   Last office visit: 10/16/2017 with prescribing provider:  Thom      180 capsule 0     Sig: TAKE 2 CAPSULES(0.8 MG) BY MOUTH DAILY    Alpha Blockers Passed    8/6/2018  8:00 AM       Passed - Blood pressure under 140/90 in past 12 months    BP Readings from Last 3 Encounters:   04/09/18 120/66   10/16/17 110/64   10/10/17 104/61                Passed - Recent (12 mo) or future (30 days) visit within the authorizing provider's specialty    Patient had office visit in the last 12 months or has a visit in the next 30 days with authorizing provider or within the authorizing provider's specialty.  See \"Patient Info\" tab in inbasket, or \"Choose Columns\" in Meds & Orders section of the refill encounter.           Passed - Patient does not have Tadalafil, Vardenafil, or Sildenafil on their medication list       Passed - Patient is 18 years of age or older        metoprolol tartrate (LOPRESSOR) 25 MG tablet [Pharmacy Med Name: METOPROLOL TARTRATE 25MG TABLETS]  Last Written Prescription Date:  10/10/2017  Last Fill Quantity: 45 tablet,  # refills: 1   Last office visit: 10/16/2017 with prescribing provider:  Thmo        45 tablet 0     Sig: TAKE 1/2 TABLET(12.5 MG) BY MOUTH DAILY    Beta-Blockers Protocol Passed    8/6/2018  8:00 AM       Passed - Blood pressure under 140/90 in past 12 months    BP Readings from Last 3 Encounters:   04/09/18 120/66   10/16/17 110/64   10/10/17 104/61                Passed - Patient is age 6 or older       Passed - Recent (12 mo) or future (30 days) visit within the authorizing provider's specialty    Patient had office visit in the last 12 months or has a visit in the next 30 days with authorizing provider or within the authorizing provider's specialty.  " "See \"Patient Info\" tab in inbasket, or \"Choose Columns\" in Meds & Orders section of the refill encounter.              "

## 2018-08-07 ENCOUNTER — MYC MEDICAL ADVICE (OUTPATIENT)
Dept: FAMILY MEDICINE | Facility: CLINIC | Age: 73
End: 2018-08-07

## 2018-08-07 RX ORDER — METOPROLOL TARTRATE 25 MG/1
TABLET, FILM COATED ORAL
Qty: 45 TABLET | Refills: 0 | Status: SHIPPED | OUTPATIENT
Start: 2018-08-07 | End: 2018-11-05

## 2018-08-07 RX ORDER — TAMSULOSIN HYDROCHLORIDE 0.4 MG/1
CAPSULE ORAL
Qty: 180 CAPSULE | Refills: 0 | Status: SHIPPED | OUTPATIENT
Start: 2018-08-07 | End: 2018-11-07

## 2018-09-04 ENCOUNTER — TELEPHONE (OUTPATIENT)
Dept: FAMILY MEDICINE | Facility: CLINIC | Age: 73
End: 2018-09-04

## 2018-09-04 NOTE — TELEPHONE ENCOUNTER
Type of outreach:  Phone, left message for patient to call back.     Martha Hernandez MA on 9/4/2018 at 2:38 PM

## 2018-09-04 NOTE — LETTER
[facility]  [address] [address] [id number] [phone]  September 11, 2018    Kushal Bush  [address]  [facility] [address]    Dear Kushal,    I care about your health and have reviewed your health plan. I have reviewed your medical conditions, medication list, and lab results and am making recommendations based on this review, to better manage your health.    You are in particular need of attention regarding:  -Diabetes    I am recommending that you:  -schedule a FOLLOWUP OFFICE APPOINTMENT with me.  I will recheck your: A1c test.      Please call us at [phone] (or use GRID) to address the above recommendations.     Thank you for trusting [facility] and we appreciate the opportunity to serve you.  We look forward to supporting your healthcare needs in the future.    Healthy Regards,    Albert Huggins MD

## 2018-09-04 NOTE — TELEPHONE ENCOUNTER
Panel Management Review      Patient has the following on his problem list:     Diabetes    ASA: Passed    Last A1C  Lab Results   Component Value Date    A1C 8.3 10/10/2017    A1C 8.1 03/20/2017    A1C 9.2 11/21/2016    A1C 9.7 07/06/2016    A1C 8.1 11/30/2015     A1C tested: FAILED    Last LDL:    Lab Results   Component Value Date    CHOL 132 10/10/2017     Lab Results   Component Value Date    HDL 40 10/10/2017     Lab Results   Component Value Date    LDL 66 10/10/2017     Lab Results   Component Value Date    TRIG 128 10/10/2017     Lab Results   Component Value Date    CHOLHDLRATIO 4.3 01/14/2013     Lab Results   Component Value Date    NHDL 92 10/10/2017       Is the patient on a Statin? YES             Is the patient on Aspirin? YES    Medications     HMG CoA Reductase Inhibitors    pravastatin (PRAVACHOL) 20 MG tablet    Salicylates    aspirin 81 MG tablet          Last three blood pressure readings:  BP Readings from Last 3 Encounters:   04/09/18 120/66   10/16/17 110/64   10/10/17 104/61       Date of last diabetes office visit: 10/10/2017     Tobacco History:     History   Smoking Status     Never Smoker   Smokeless Tobacco     Never Used           Composite cancer screening  Chart review shows that this patient is due/due soon for the following None  Summary:    Patient is due/failing the following:   A1C    Action needed:   Patient needs office visit for diabetes.    Type of outreach:    routed to panel pool for outreach    Questions for provider review:    None                                                                                                                                    Kaye Napier CMA       Chart routed to Care Team .

## 2018-11-05 ENCOUNTER — OFFICE VISIT (OUTPATIENT)
Dept: FAMILY MEDICINE | Facility: CLINIC | Age: 73
End: 2018-11-05
Payer: MEDICARE

## 2018-11-05 VITALS
SYSTOLIC BLOOD PRESSURE: 108 MMHG | TEMPERATURE: 97.4 F | DIASTOLIC BLOOD PRESSURE: 68 MMHG | HEIGHT: 67 IN | RESPIRATION RATE: 14 BRPM | BODY MASS INDEX: 25.74 KG/M2 | WEIGHT: 164 LBS | HEART RATE: 68 BPM

## 2018-11-05 DIAGNOSIS — L57.0 AK (ACTINIC KERATOSIS): ICD-10-CM

## 2018-11-05 DIAGNOSIS — I10 ESSENTIAL HYPERTENSION WITH GOAL BLOOD PRESSURE LESS THAN 140/90: ICD-10-CM

## 2018-11-05 DIAGNOSIS — L82.1 SEBORRHEIC KERATOSES: ICD-10-CM

## 2018-11-05 DIAGNOSIS — E78.5 HYPERLIPIDEMIA LDL GOAL <100: ICD-10-CM

## 2018-11-05 DIAGNOSIS — H93.13 TINNITUS, BILATERAL: ICD-10-CM

## 2018-11-05 DIAGNOSIS — R07.89 ATYPICAL CHEST PAIN: ICD-10-CM

## 2018-11-05 DIAGNOSIS — B35.1 ONYCHOMYCOSIS: ICD-10-CM

## 2018-11-05 DIAGNOSIS — Z00.00 PREVENTATIVE HEALTH CARE: Primary | ICD-10-CM

## 2018-11-05 LAB — HBA1C MFR BLD: 10.5 % (ref 0–5.6)

## 2018-11-05 PROCEDURE — 99397 PER PM REEVAL EST PAT 65+ YR: CPT | Mod: 25 | Performed by: FAMILY MEDICINE

## 2018-11-05 PROCEDURE — 80061 LIPID PANEL: CPT | Performed by: FAMILY MEDICINE

## 2018-11-05 PROCEDURE — 36415 COLL VENOUS BLD VENIPUNCTURE: CPT | Performed by: FAMILY MEDICINE

## 2018-11-05 PROCEDURE — 84443 ASSAY THYROID STIM HORMONE: CPT | Performed by: FAMILY MEDICINE

## 2018-11-05 PROCEDURE — 17110 DESTRUCTION B9 LES UP TO 14: CPT | Performed by: FAMILY MEDICINE

## 2018-11-05 PROCEDURE — 80053 COMPREHEN METABOLIC PANEL: CPT | Performed by: FAMILY MEDICINE

## 2018-11-05 PROCEDURE — 82043 UR ALBUMIN QUANTITATIVE: CPT | Performed by: FAMILY MEDICINE

## 2018-11-05 PROCEDURE — 83036 HEMOGLOBIN GLYCOSYLATED A1C: CPT | Performed by: FAMILY MEDICINE

## 2018-11-05 RX ORDER — PRAVASTATIN SODIUM 20 MG
TABLET ORAL
Qty: 90 TABLET | Refills: 3 | Status: SHIPPED | OUTPATIENT
Start: 2018-11-05 | End: 2019-11-04

## 2018-11-05 RX ORDER — GLIPIZIDE 10 MG/1
10 TABLET ORAL
Qty: 180 TABLET | Refills: 1 | Status: SHIPPED | OUTPATIENT
Start: 2018-11-05 | End: 2018-12-31

## 2018-11-05 RX ORDER — METOPROLOL TARTRATE 25 MG/1
TABLET, FILM COATED ORAL
Qty: 45 TABLET | Refills: 1 | Status: SHIPPED | OUTPATIENT
Start: 2018-11-05 | End: 2019-04-30

## 2018-11-05 RX ORDER — LISINOPRIL 2.5 MG/1
2.5 TABLET ORAL DAILY
Qty: 90 TABLET | Refills: 1 | Status: SHIPPED | OUTPATIENT
Start: 2018-11-05 | End: 2019-12-31

## 2018-11-05 NOTE — MR AVS SNAPSHOT
After Visit Summary   11/5/2018    Kushal Bush    MRN: [id number]           Patient Information     Date Of Birth          1945        Visit Information        Provider Department      11/5/2018 1:30 PM Albert Huggins MD [facility]        Today's Diagnoses     Preventative health care    -  1    Essential hypertension with goal blood pressure less than 140/90        Uncontrolled type 2 diabetes mellitus without complication, without long-term current use of insulin (H)        Onychomycosis        Atypical chest pain        Hyperlipidemia LDL goal <100        AK (actinic keratosis)        Seborrheic keratoses        Tinnitus, bilateral          Care Instructions    New Shingles Vaccination @ pharmacy            Follow-ups after your visit        Additional Services     OTOLARYNGOLOGY REFERRAL       Your provider has referred you to: N: [facility] [phone]   [email or web address]    Please be aware that coverage of these services is subject to the terms and limitations of your health insurance plan.  Call member services at your health plan with any benefit or coverage questions.      Please bring the following with you to your appointment:    (1) Any X-Rays, CTs or MRIs which have been performed.  Contact the facility where they were done to arrange for  prior to your scheduled appointment.   (2) List of current medications  (3) This referral request   (4) Any documents/labs given to you for this referral                  Follow-up notes from your care team     Return in about 6 months (around 5/5/2019).      Who to contact     If you have questions or need follow up information about today's clinic visit or your schedule please contact [facility] directly at [phone].  Normal or non-critical lab and imaging results will be communicated to you by MyChart, letter or phone within 4 business  "days after the clinic has received the results. If you do not hear from us within 7 days, please contact the clinic through [employer] or phone. If you have a critical or abnormal lab result, we will notify you by phone as soon as possible.  Submit refill requests through [employer] or call your pharmacy and they will forward the refill request to us. Please allow 3 business days for your refill to be completed.          Additional Information About Your Visit        [employer] Information     [employer] gives you secure access to your electronic health record. If you see a primary care provider, you can also send messages to your care team and make appointments. If you have questions, please call your primary care clinic.  If you do not have a primary care provider, please call [phone] and they will assist you.        Care EveryWhere ID     This is your Care EveryWhere ID. This could be used by other organizations to access your [facility] medical records  [id number]        Your Vitals Were     Pulse Temperature Respirations Height BMI (Body Mass Index)       68 97.4  F (36.3  C) (Oral) 14 5' 7\" (1.702 m) 25.69 kg/m2        Blood Pressure from Last 3 Encounters:   11/05/18 108/68   04/09/18 120/66   10/16/17 110/64    Weight from Last 3 Encounters:   11/05/18 164 lb (74.4 kg)   06/20/18 165 lb (74.8 kg)   04/09/18 165 lb (74.8 kg)              We Performed the Following     Albumin Random Urine Quantitative with Creat Ratio     Comprehensive metabolic panel     DESTRUCT BENIGN LESION, UP TO 14     Hemoglobin A1c     Lipid panel reflex to direct LDL Non-fasting     OTOLARYNGOLOGY REFERRAL     TSH with free T4 reflex          Today's Medication Changes          These changes are accurate as of 11/5/18 11:59 PM.  If you have any questions, ask your nurse or doctor.               These medicines have changed or have updated prescriptions.        Dose/Directions    lisinopril 2.5 MG tablet   Commonly known as:  " PRINIVIL/Zestril   This may have changed:    - medication strength  - how much to take   Used for:  Essential hypertension with goal blood pressure less than 140/90   Changed by:  Albert Huggins MD        Dose:  2.5 mg   Take 1 tablet (2.5 mg) by mouth daily   Quantity:  90 tablet   Refills:  1         Stop taking these medicines if you haven't already. Please contact your care team if you have questions.     terbinafine 250 MG tablet   Commonly known as:  lamISIL   Stopped by:  Albert Huggins MD                Where to get your medicines      These medications were sent to [facility] [address] [place] [address] [id number] [place] [address] AT [place] [address]  [id number] [place] [id number]     Phone:  [phone]     glipiZIDE 10 MG tablet    lisinopril 2.5 MG tablet    metoprolol tartrate 25 MG tablet    pravastatin 20 MG tablet                Primary Care Provider Office Phone # Fax #    Albert Huggins [phone] [address] [facility] [address]        Equal Access to Services     [facility]: Hadii aad ku hadasho Soomaali, waaxda luqadaha, qaybta kaalmada adeegyada, desmond peoples . So [facility] [phone].    ATENCIÓN: Si habla español, tiene a chicas disposición servicios gratuitos de asistencia lingüística. [facility] [phone].    We comply with applicable federal civil rights laws and [place] laws. We do not discriminate on the basis of race, color, national origin, age, disability, sex, sexual orientation, or gender identity.            Thank you!     Thank you for choosing [facility]  for your care. Our goal is always to provide you with excellent care. Hearing back from our patients is one way we can continue to improve our services. Please take a few minutes to complete the written survey that you may receive in the mail after your visit with us. Thank you!             Your Updated Medication List -  Protect others around you: Learn how to safely use, store and throw away your medicines at www.disposemymeds.org.          This list is accurate as of 11/5/18 11:59 PM.  Always use your most recent med list.                   Brand Name Dispense Instructions for use Diagnosis    blood glucose monitoring test strip    TRUETRACK TEST    100 each    Use to test blood sugar 2 times daily or as directed.  Ok to substitute alternative if insurance prefers.    Uncontrolled type 2 diabetes mellitus without complication, without long-term current use of insulin (H)       doxazosin 4 MG tablet    CARDURA    90 tablet    Take 1 tablet (4 mg) by mouth At Bedtime    Essential hypertension with goal blood pressure less than 140/90, Benign non-nodular prostatic hyperplasia without lower urinary tract symptoms       fluticasone 50 MCG/ACT spray    FLONASE    16 g    Spray 1-2 sprays into both nostrils daily    Throat pain       glipiZIDE 10 MG tablet    GLUCOTROL    180 tablet    Take 1 tablet (10 mg) by mouth 2 times daily (before meals)    Uncontrolled type 2 diabetes mellitus without complication, without long-term current use of insulin (H)       lisinopril 2.5 MG tablet    PRINIVIL/Zestril    90 tablet    Take 1 tablet (2.5 mg) by mouth daily    Essential hypertension with goal blood pressure less than 140/90       metoprolol tartrate 25 MG tablet    LOPRESSOR    45 tablet    TAKE 1/2 TABLET(12.5 MG) BY MOUTH DAILY    Atypical chest pain, Essential hypertension with goal blood pressure less than 140/90       pravastatin 20 MG tablet    PRAVACHOL    90 tablet    TAKE 1 TABLET(20 MG) BY MOUTH DAILY    Hyperlipidemia LDL goal <100       tamsulosin 0.4 MG capsule    FLOMAX    180 capsule    TAKE 2 CAPSULES(0.8 MG) BY MOUTH DAILY    Benign non-nodular prostatic hyperplasia without lower urinary tract symptoms

## 2018-11-05 NOTE — PROGRESS NOTES
SUBJECTIVE:   Kushal Bush is a 73 year old male who presents for Preventive Visit.    Are you in the first 12 months of your Medicare coverage?  No    History of Present Illness     Diabetes:     Frequency of checking blood sugars::  1 time a day    Diabetic concerns::  None and Blood sugar frequently over 200    Hypoglycemia symptoms::  None    Paraesthesia present::  No    Eye Exam in the last year::  Yes    oct 2017    Diabetes Management Resources      Fall risk:no falls       COGNITIVE SCREEN  1) Repeat 3 items  2) Clock draw:NORMAL  3) 3 item recall: Recalls 3 objects  Results: 3 items recalled: COGNITIVE IMPAIRMENT LESS LIKELY    Mini-CogTM Copyright S Vaihsali. Licensed by the author for use in [employer]; reprinted with permission (soob@[email or web address]). All rights reserved.        Reviewed and updated as needed this visit by clinical staff  Tobacco  Allergies  Meds  Med Hx  Surg Hx  Fam Hx  Soc Hx        Reviewed and updated as needed this visit by Provider        Social History   Substance Use Topics     Smoking status: Never Smoker     Smokeless tobacco: Never Used     Alcohol use Yes      Comment: occas.         No flowsheet data found.            Do you feel safe in your environment - No    Do you have a Health Care Directive?: yes 2015    Current providers sharing in care for this patient include:   Patient Care Team:  Albert Huggins MD as PCP - General (Family Practice)    The following health maintenance items are reviewed in Epic and correct as of today:  Health Maintenance   Topic Date Due     MEDICARE ANNUAL WELLNESS VISIT  04/15/2016     A1C Q3 MO  01/10/2018     TSH W/ FREE T4 REFLEX Q2 YEAR  07/06/2018     INFLUENZA VACCINE (1) 09/01/2018     FOOT EXAM Q1 YEAR  10/10/2018     CREATININE Q1 YEAR  10/10/2018     LIPID MONITORING Q1 YEAR  10/10/2018     MICROALBUMIN Q1 YEAR  10/10/2018     PHQ-2 Q1 YR  10/10/2018     FALL RISK ASSESSMENT  10/10/2018     COLONOSCOPY Q5 YR   01/29/2019     EYE EXAM Q1 YEAR  04/23/2019     ADVANCE DIRECTIVE PLANNING Q5 YRS  01/21/2020     TETANUS IMMUNIZATION (SYSTEM ASSIGNED)  01/11/2023     PNEUMOCOCCAL  Completed     AORTIC ANEURYSM SCREENING (SYSTEM ASSIGNED)  Completed         Past Medical History:   Diagnosis Date     Asthma      Benign non-nodular prostatic hyperplasia without lower urinary tract symptoms 7/18/2016     BPH (benign prostatic hyperplasia) 9/14/2011     Campylobacter diarrhea 7/18/2016     CMC arthritis, thumb, degenerative 11/25/2013     Diabetes type 2, uncontrolled (H) 7/18/2016     Essential hypertension with goal blood pressure less than 140/90 7/18/2016     Fatigue, unspecified type 7/18/2016     Heart palpitations 9/14/2011     HTN (hypertension) 9/14/2011     HTN, goal below 140/90 1/4/2012     Hyperglycemia 10/27/2011     Hyperlipidemia LDL goal <100 10/27/2011     Impacted cerumen 9/14/2011     Intermittent asthma 9/14/2011     Lumbar radiculopathy 11/7/2011     Mumps      Palpitations      Post-nasal drainage 9/14/2011     Presbycusis 9/14/2011     Seborrheic keratoses 9/14/2011     Tubular adenoma of colon      Type 2 diabetes mellitus without complications (H) 10/12/2015     Type 2 diabetes, HbA1c goal < 7% (H) 1/6/2012     Type II diabetes mellitus with HbA1C goal between 7 and 8 6/18/2013     Uncontrolled type 2 diabetes mellitus without complication, without long-term current use of insulin (H) 12/27/2016       Past Surgical History:   Procedure Laterality Date     COLONOSCOPY  1/29/14     COLONOSCOPY  1/29/2014    Procedure: COMBINED COLONOSCOPY, SINGLE BIOPSY/POLYPECTOMY BY BIOPSY;  COLONOSCOPY two polyps with jumbo forceps;  Surgeon: Alina Hopkins MD;  Location:  GI     EYE SURGERY Right 12/14/16    cataract removal      ingrown toenail         Family History   Problem Relation Age of Onset     Cancer Mother      age 89, uterine/breast     HEART DISEASE Father      51       Social History   Substance Use  "Topics     Smoking status: Never Smoker     Smokeless tobacco: Never Used     Alcohol use Yes      Comment: occas.         Essential hypertension with goal blood pressure less than 140/90   BP Readings from Last 1 Encounters:   11/05/18 108/68       Uncontrolled type 2 diabetes mellitus without complication, without long-term current use of insulin (H)   Lab Results   Component Value Date    A1C 10.5 11/05/2018    A1C 8.3 10/10/2017    A1C 8.1 03/20/2017    A1C 9.2 11/21/2016    A1C 9.7 07/06/2016           Atypical chest pain absent on current therapies  l      Review of Systems  Constitutional, HEENT, cardiovascular, pulmonary, GI, , musculoskeletal, neuro, skin, endocrine and psych systems are negative, except as otherwise noted.    OBJECTIVE:   Ht 5' 7\" (1.702 m)  BMI 25.84 kg/m2 Estimated body mass index is 25.84 kg/(m^2) as calculated from the following:    Height as of this encounter: 5' 7\" (1.702 m).    Weight as of 6/20/18: 165 lb (74.8 kg).  Physical Exam  GENERAL: healthy, alert and no distress  EYES: Eyes grossly normal to inspection, PERRL and conjunctivae and sclerae normal  HENT: see additional note  NECK: no adenopathy, no asymmetry, masses, or scars and thyroid normal to palpation  RESP: lungs clear to auscultation - no rales, rhonchi or wheezes  CV: regular rate and rhythm, normal S1 S2, no S3 or S4, no murmur, click or rub, no peripheral edema and peripheral pulses strong  ABDOMEN: soft, nontender, no hepatosplenomegaly, no masses and bowel sounds normal  MS: no gross musculoskeletal defects noted, no edema  SKIN: see additional note  NEURO: Normal strength and tone, mentation intact and speech normal  PSYCH: mentation appears normal, affect normal/bright  Feet with intact skin, pulses non palpable, onychomycosis        ASSESSMENT / PLAN:   ASSESSMENT / PLAN:  (Z00.00) Preventative health care  (primary encounter diagnosis)  Comment: completed  Plan:     (I10) Essential hypertension with goal " "blood pressure less than 140/90  Comment:   BP Readings from Last 1 Encounters:   11/05/18 108/68       Plan: Comprehensive metabolic panel, lisinopril         (PRINIVIL/ZESTRIL) 2.5 MG tablet, metoprolol         tartrate (LOPRESSOR) 25 MG tablet            (E11.65) Uncontrolled type 2 diabetes mellitus without complication, without long-term current use of insulin (H)  Comment: broaden data base  assess  Plan: Hemoglobin A1c, Comprehensive metabolic panel,         Lipid panel reflex to direct LDL Non-fasting,         TSH with free T4 reflex, Albumin Random Urine         Quantitative with Creat Ratio, glipiZIDE         (GLUCOTROL) 10 MG tablet        refill    (B35.1) Onychomycosis  Comment: he declines therapies  Plan:     (R07.89) Atypical chest pain  Comment: controlled  Plan: metoprolol tartrate (LOPRESSOR) 25 MG tablet            (E78.5) Hyperlipidemia LDL goal <100  Comment: treated  Plan: pravastatin (PRAVACHOL) 20 MG tablet            End of Life Planning:  Patient currently has an advanced directive: Yes.  Practitioner is supportive of decision.    COUNSELING:  Reviewed preventive health counseling, as reflected in patient instructions    BP Readings from Last 1 Encounters:   04/09/18 120/66     Estimated body mass index is 25.84 kg/(m^2) as calculated from the following:    Height as of this encounter: 5' 7\" (1.702 m).    Weight as of 6/20/18: 165 lb (74.8 kg).      Weight management plan: no changes     reports that he has never smoked. He has never used smokeless tobacco.      Appropriate preventive services were discussed with this patient, including applicable screening as appropriate for cardiovascular disease, diabetes, osteopenia/osteoporosis, and glaucoma.  As appropriate for age/gender, discussed screening for colorectal cancer, prostate cancer, breast cancer, and cervical cancer. Checklist reviewing preventive services available has been given to the patient.    Reviewed patients plan of care and " provided an AVS. The Basic Care Plan (routine screening as documented in Health Maintenance) for Kushal meets the Care Plan requirement. This Care Plan has been established and reviewed with the Patient.    Counseling Resources:  ATP IV Guidelines  Pooled Cohorts Equation Calculator  Breast Cancer Risk Calculator  FRAX Risk Assessment  ICSI Preventive Guidelines  Dietary Guidelines for Americans, 2010  USDA's MyPlate  ASA Prophylaxis  Lung CA Screening    Albert Huggins MD  [facility]

## 2018-11-06 LAB
ALBUMIN SERPL-MCNC: 4 G/DL (ref 3.4–5)
ALP SERPL-CCNC: 76 U/L (ref 40–150)
ALT SERPL W P-5'-P-CCNC: 43 U/L (ref 0–70)
ANION GAP SERPL CALCULATED.3IONS-SCNC: 9 MMOL/L (ref 3–14)
AST SERPL W P-5'-P-CCNC: 30 U/L (ref 0–45)
BILIRUB SERPL-MCNC: 0.9 MG/DL (ref 0.2–1.3)
BUN SERPL-MCNC: 27 MG/DL (ref 7–30)
CALCIUM SERPL-MCNC: 9.5 MG/DL (ref 8.5–10.1)
CHLORIDE SERPL-SCNC: 104 MMOL/L (ref 94–109)
CHOLEST SERPL-MCNC: 140 MG/DL
CO2 SERPL-SCNC: 27 MMOL/L (ref 20–32)
CREAT SERPL-MCNC: 0.95 MG/DL (ref 0.66–1.25)
CREAT UR-MCNC: 210 MG/DL
GFR SERPL CREATININE-BSD FRML MDRD: 77 ML/MIN/1.7M2
GLUCOSE SERPL-MCNC: 113 MG/DL (ref 70–99)
HDLC SERPL-MCNC: 36 MG/DL
LDLC SERPL CALC-MCNC: 75 MG/DL
MICROALBUMIN UR-MCNC: 11 MG/L
MICROALBUMIN/CREAT UR: 5.14 MG/G CR (ref 0–17)
NONHDLC SERPL-MCNC: 104 MG/DL
POTASSIUM SERPL-SCNC: 4.7 MMOL/L (ref 3.4–5.3)
PROT SERPL-MCNC: 7.6 G/DL (ref 6.8–8.8)
SODIUM SERPL-SCNC: 140 MMOL/L (ref 133–144)
TRIGL SERPL-MCNC: 147 MG/DL
TSH SERPL DL<=0.005 MIU/L-ACNC: 2.1 MU/L (ref 0.4–4)

## 2018-11-06 NOTE — PROGRESS NOTES
Diabetes is getting worse.  It might be time to think about insulin.  A modern insulin pump is very small and easy to care for.  I can add more pills, but I know you do not like them.  What you think?  Albert Huggins MD

## 2018-11-07 ENCOUNTER — MYC REFILL (OUTPATIENT)
Dept: FAMILY MEDICINE | Facility: CLINIC | Age: 73
End: 2018-11-07

## 2018-11-07 DIAGNOSIS — N40.0 BENIGN NON-NODULAR PROSTATIC HYPERPLASIA WITHOUT LOWER URINARY TRACT SYMPTOMS: ICD-10-CM

## 2018-11-07 RX ORDER — TAMSULOSIN HYDROCHLORIDE 0.4 MG/1
CAPSULE ORAL
Qty: 180 CAPSULE | Refills: 0 | Status: CANCELLED | OUTPATIENT
Start: 2018-11-07

## 2018-11-07 NOTE — PROGRESS NOTES
"  AK (actinic keratosis)  Seborrheic keratoses a few keratoses, he would like them treated  Tinnitus, bilateral has previously been affected bt cerumen, worsening  ROS no itch  /68 (BP Location: Right arm, Patient Position: Chair, Cuff Size: Adult Regular)  Pulse 68  Temp 97.4  F (36.3  C) (Oral)  Resp 14  Ht 5' 7\" (1.702 m)  Wt 164 lb (74.4 kg)  BMI 25.69 kg/m2  Cheeks with both AKs and typical seb Ks  Ears full of cerumen, lavage unsuccessful    Cryotherapy applied to AKs and Seb Ks      ASSESSMENT / PLAN:      (L57.0) AK (actinic keratosis)  Comment: discussed debbi hx  Plan: DESTRUCT BENIGN LESION, UP TO 14            (L82.1) Seborrheic keratoses  Comment: discussed debbi hx  Plan: DESTRUCT BENIGN LESION, UP TO 14            (H93.13) Tinnitus, bilateral  Comment: broaden data base, treat    Plan: OTOLARYNGOLOGY REFERRAL                  Albert Huggins MD      "

## 2018-11-07 NOTE — TELEPHONE ENCOUNTER
Message from [facility]:  Original authorizing provider: Albert Huggins MD Edbarrera LLANESTiana Bush would like a refill of the following medications:  tamsulosin (FLOMAX) 0.4 MG capsule [Albert Huggins MD]    Preferred pharmacy: [facility] [address] [place] [address] [id number] AT [place] [address]    Comment:  Hello Dr. Huggins, I need a refill on my Tamsulosin....I take two pills at night and am out. Regarding insulin, an A1C of 10+ is not optimal, and I will think very seriously about insulin. Will talk with Angelica. My belief is that once I go on this path I'm hooked for life with insulin. but not having results with Glipizide 10ml where else could I go. I will decide.

## 2018-11-07 NOTE — TELEPHONE ENCOUNTER
"Requested Prescriptions   Pending Prescriptions Disp Refills     tamsulosin (FLOMAX) 0.4 MG capsule [Pharmacy Med Name: TAMSULOSIN 0.4MG CAPSULES] 180 capsule 0    Last Written Prescription Date:  8/7/18  Last Fill Quantity: 180,  # refills: 0   Last Office Visit: 11/5/2018   Future Office Visit:      Sig: TAKE 2 CAPSULES(0.8 MG) BY MOUTH DAILY    Alpha Blockers Passed    11/7/2018  9:59 AM       Passed - Blood pressure under 140/90 in past 12 months    BP Readings from Last 3 Encounters:   11/05/18 108/68   04/09/18 120/66   10/16/17 110/64                Passed - Recent (12 mo) or future (30 days) visit within the authorizing provider's specialty    Patient had office visit in the last 12 months or has a visit in the next 30 days with authorizing provider or within the authorizing provider's specialty.  See \"Patient Info\" tab in inbasket, or \"Choose Columns\" in Meds & Orders section of the refill encounter.             Passed - Patient does not have Tadalafil, Vardenafil, or Sildenafil on their medication list       Passed - Patient is 18 years of age or older          "

## 2018-11-14 ENCOUNTER — HOSPITAL ENCOUNTER (OUTPATIENT)
Facility: CLINIC | Age: 73
End: 2018-11-14
Attending: UROLOGY | Admitting: UROLOGY
Payer: MEDICARE

## 2018-12-03 ENCOUNTER — OFFICE VISIT (OUTPATIENT)
Dept: PHARMACY | Facility: CLINIC | Age: 73
End: 2018-12-03
Payer: COMMERCIAL

## 2018-12-03 VITALS
BODY MASS INDEX: 26.78 KG/M2 | HEART RATE: 71 BPM | WEIGHT: 171 LBS | OXYGEN SATURATION: 94 % | SYSTOLIC BLOOD PRESSURE: 122 MMHG | DIASTOLIC BLOOD PRESSURE: 60 MMHG

## 2018-12-03 DIAGNOSIS — E78.5 HYPERLIPIDEMIA LDL GOAL <100: ICD-10-CM

## 2018-12-03 PROCEDURE — 99605 MTMS BY PHARM NP 15 MIN: CPT | Performed by: PHARMACIST

## 2018-12-03 PROCEDURE — 99607 MTMS BY PHARM ADDL 15 MIN: CPT | Performed by: PHARMACIST

## 2018-12-03 RX ORDER — PIOGLITAZONEHYDROCHLORIDE 30 MG/1
30 TABLET ORAL DAILY
Qty: 30 TABLET | Refills: 5 | Status: SHIPPED | OUTPATIENT
Start: 2018-12-03 | End: 2018-12-03

## 2018-12-03 NOTE — MR AVS SNAPSHOT
After Visit Summary   12/3/2018    Kuhsal Bush    MRN: [id number]           Patient Information     Date Of Birth          1945        Visit Information        Provider Department      12/3/2018 1:30 PM Kim Webster RPH [facility] MTM        Today's Diagnoses     Uncontrolled type 2 diabetes mellitus without complication, without long-term current use of insulin (H)    -  1      Care Instructions    Recommendations from today's MTM visit:                                                    MTM (medication therapy management) is a service provided by a clinical pharmacist designed to help you get the most of out of your medicines.   Today we reviewed what your medicines are for, how to know if they are working, that your medicines are safe and how to make your medicine regimen as easy as possible.     1.  Current A1c is 10.5% ---Please stay glipizide 10mg twice daily at b-fast and dinner , lets ADD - Pioglitazone 30mg tablet --once daily , start with 1/2 tablet once daily x 1 week --see how you tolerate it --then if ok --increase to 1 full tab daily thereafter.     Check blood sugars once daily --fasting in am --Goal is ,  Then check next day -2 hours after main meal --Goal is < 160.   A1c goal is 7.5% or less is optimum but <8% is acceptable.       Next MTM visit:   Monday December 31st at 9;30am --bring blood sugar meter .    To schedule another MTM appointment, please call the clinic directly or you may call the MTM scheduling line at [phone] or toll-free at [phone].     My Clinical Pharmacist's contact information:                                                      It was a pleasure talking with you today!  Please feel free to contact me with any questions or concerns you have.      Kim Webster Rph.  Medication Therapy Management Provider  [phone]      You may receive a survey about the MTM services you received.  I would appreciate your  feedback to help me serve you better in the future. Please fill it out and return it when you can. Your comments will be anonymous.                    Follow-ups after your visit        Your next 10 appointments already scheduled     Dec 31, 2018  9:30 AM CST   SHORT with Kim Webster RPH   [facility] ([facility])    [address]  [facility] [address]   [phone]            Jan 23, 2019   Procedure with Dejuan Felix MD   [facility] (--)    [address]  [facility] [address]   [phone]            Mar 20, 2019 10:00 AM CDT   Post-Op with Dejuan Felix MD   [facility] ([facility])    [address]  [address]  [facility] [id number] [phone]              Who to contact     If you have questions or need follow up information about today's clinic visit or your schedule please contact [facility] directly at [phone].  Normal or non-critical lab and imaging results will be communicated to you by MyChart, letter or phone within 4 business days after the clinic has received the results. If you do not hear from us within 7 days, please contact the clinic through [facility] or phone. If you have a critical or abnormal lab result, we will notify you by phone as soon as possible.  Submit refill requests through [employer] or call your pharmacy and they will forward the refill request to us. Please allow 3 business days for your refill to be completed.          Additional Information About Your Visit        [facility] Information     [employer] gives you secure access to your electronic health record. If you see a primary care provider, you can also send messages to your care team and make appointments. If you have questions, please call your primary care clinic.  If you do not have a primary care provider, please call  [phone] and they will assist you.        Care EveryWhere ID     This is your Care EveryWhere ID. This could be used by other organizations to access your [place] medical records  [id number]        Your Vitals Were     Pulse Pulse Oximetry BMI (Body Mass Index)             71 94% 26.78 kg/m2          Blood Pressure from Last 3 Encounters:   12/03/18 122/60   11/05/18 108/68   04/09/18 120/66    Weight from Last 3 Encounters:   12/03/18 171 lb (77.6 kg)   11/05/18 164 lb (74.4 kg)   06/20/18 165 lb (74.8 kg)              Today, you had the following     No orders found for display         Today's Medication Changes          These changes are accurate as of 12/3/18  2:18 PM.  If you have any questions, ask your nurse or doctor.               Start taking these medicines.        Dose/Directions    pioglitazone 30 MG tablet   Commonly known as:  ACTOS   Used for:  Uncontrolled type 2 diabetes mellitus without complication, without long-term current use of insulin (H)        Dose:  30 mg   Take 1 tablet (30 mg) by mouth daily   Quantity:  30 tablet   Refills:  5            Where to get your medicines      These medications were sent to [facility] [address] [place] [address]     Phone:  [phone]     pioglitazone 30 MG tablet                Primary Care Provider Office Phone # Fax #    Albert Huggins [phone] [address] [facility] [address]        Equal Access to Services     CHELA ARREOLA AH: Hadii justin talboto [place], waaxda luqadaha, qaybta kaalmada ez, desmond salgado. So [facility] [phone].    ATENCIÓN: Si habla español, tiene a chicas disposición servicios gratuitos de asistencia lingüística. Llame al [phone].    We comply with applicable federal civil rights laws and [place] laws. We do not discriminate on the basis of race, color,  national origin, age, disability, sex, sexual orientation, or gender identity.            Thank you!     Thank you for choosing [facility]  for your care. Our goal is always to provide you with excellent care. Hearing back from our patients is one way we can continue to improve our services. Please take a few minutes to complete the written survey that you may receive in the mail after your visit with us. Thank you!             Your Updated Medication List - Protect others around you: Learn how to safely use, store and throw away your medicines at www.disposemymeds.org.          This list is accurate as of 12/3/18  2:18 PM.  Always use your most recent med list.                   Brand Name Dispense Instructions for use Diagnosis    blood glucose monitoring test strip    TRUETRACK TEST    100 each    Use to test blood sugar 2 times daily or as directed.  Ok to substitute alternative if insurance prefers.    Uncontrolled type 2 diabetes mellitus without complication, without long-term current use of insulin (H)       doxazosin 4 MG tablet    CARDURA    90 tablet    Take 1 tablet (4 mg) by mouth At Bedtime    Essential hypertension with goal blood pressure less than 140/90, Benign non-nodular prostatic hyperplasia without lower urinary tract symptoms       fluticasone 50 MCG/ACT nasal spray    FLONASE    16 g    Spray 1-2 sprays into both nostrils daily    Throat pain       glipiZIDE 10 MG tablet    GLUCOTROL    180 tablet    Take 1 tablet (10 mg) by mouth 2 times daily (before meals)    Uncontrolled type 2 diabetes mellitus without complication, without long-term current use of insulin (H)       lisinopril 2.5 MG tablet    PRINIVIL/Zestril    90 tablet    Take 1 tablet (2.5 mg) by mouth daily    Essential hypertension with goal blood pressure less than 140/90       metoprolol tartrate 25 MG tablet    LOPRESSOR    45 tablet    TAKE 1/2 TABLET(12.5 MG) BY MOUTH DAILY    Atypical chest pain,  Essential hypertension with goal blood pressure less than 140/90       pioglitazone 30 MG tablet    ACTOS    30 tablet    Take 1 tablet (30 mg) by mouth daily    Uncontrolled type 2 diabetes mellitus without complication, without long-term current use of insulin (H)       pravastatin 20 MG tablet    PRAVACHOL    90 tablet    TAKE 1 TABLET(20 MG) BY MOUTH DAILY    Hyperlipidemia LDL goal <100       tamsulosin 0.4 MG capsule    FLOMAX    180 capsule    TAKE 2 CAPSULES(0.8 MG) BY MOUTH DAILY    Benign non-nodular prostatic hyperplasia without lower urinary tract symptoms

## 2018-12-03 NOTE — TELEPHONE ENCOUNTER
Requested Prescriptions   Pending Prescriptions Disp Refills     pioglitazone (ACTOS) 30 MG tablet [Pharmacy Med Name: PIOGLITAZONE 30MG TABLETS]  Medication may not be due for refill  Last Written Prescription Date:  12/3/2018  Last Fill Quantity: 30 tablet,  # refills: 5   Last office visit: 11/5/2018 with prescribing provider:  Joseluis   Future Office Visit:   Next 5 appointments (look out 90 days)     Dec 31, 2018  9:30 AM CST   SHORT with Kim Webster RPH   [facility] ([facility])    [address] [facility] [address]   [phone]                  90 tablet 5     Sig: TAKE 1 TABLET(30 MG) BY MOUTH DAILY    Thiazolidinedione Agents (TZDs)  Passed    12/3/2018  2:55 PM       Passed - Blood pressure less than 140/90 in past 6 months    BP Readings from Last 3 Encounters:   12/03/18 122/60   11/05/18 108/68   04/09/18 120/66                Passed - Patient has documented LDL within the past 12 mos.    Recent Labs   Lab Test  11/05/18   1455   LDL  75            Passed - Patient has a normal ALT within the past 12 mos.    Recent Labs   Lab Test  11/05/18   1455   ALT  43            Passed - Patient has a normal AST within the past 12 mos.     Recent Labs   Lab Test  11/05/18   1455   AST  30            Passed - Patient has had a Microalbumin in the past 12 mos.    Recent Labs   Lab Test  11/05/18   1454   MICROL  11   UMALCR  5.14            Passed - Patient has documented A1c within the specified period of time.    If HgbA1C is 8 or greater, it needs to be on file within the past 3 months.  If less than 8, must be on file within the past 6 months.     Recent Labs   Lab Test  11/05/18   1455   A1C  10.5*            Passed - Diagnosis not CHF       Passed - Patient is age 18 or older       Passed - Patient has a normal serum Creatinine in the past 12 months    Recent Labs   Lab Test  11/05/18   1455   CR  0.95            Passed - Recent (6 mo) or future (30 days)  "visit within the authorizing provider's specialty    Patient had office visit in the last 6 months or has a visit in the next 30 days with authorizing provider or within the authorizing provider's specialty.  See \"Patient Info\" tab in inbasket, or \"Choose Columns\" in Meds & Orders section of the refill encounter.              "

## 2018-12-03 NOTE — PATIENT INSTRUCTIONS
Recommendations from today's MTM visit:                                                    MTM (medication therapy management) is a service provided by a clinical pharmacist designed to help you get the most of out of your medicines.   Today we reviewed what your medicines are for, how to know if they are working, that your medicines are safe and how to make your medicine regimen as easy as possible.     1.  Current A1c is 10.5% ---Please stay glipizide 10mg twice daily at b-fast and dinner , lets ADD - Pioglitazone 30mg tablet --once daily , start with 1/2 tablet once daily x 1 week --see how you tolerate it --then if ok --increase to 1 full tab daily thereafter.     Check blood sugars once daily --fasting in am --Goal is ,  Then check next day -2 hours after main meal --Goal is < 160.   A1c goal is 7.5% or less is optimum but <8% is acceptable.       Next MTM visit:   Monday December 31st at 9;30am --bring blood sugar meter .    To schedule another MTM appointment, please call the clinic directly or you may call the MTM scheduling line at [phone] or toll-free at [phone].     My Clinical Pharmacist's contact information:                                                      It was a pleasure talking with you today!  Please feel free to contact me with any questions or concerns you have.      Kim Webster [facility].  Medication Therapy Management Provider  [phone]      You may receive a survey about the MTM services you received.  I would appreciate your feedback to help me serve you better in the future. Please fill it out and return it when you can. Your comments will be anonymous.

## 2018-12-03 NOTE — Clinical Note
Thanks for mtm referral ----added actos and will see over next 3 months if he needs insulin and if so will encourage NPH bid .  Keegan

## 2018-12-03 NOTE — PROGRESS NOTES
SUBJECTIVE/OBJECTIVE:                           Kushal Bush is a 73 year old male coming in for an initial visit for Medication Therapy Management.  He was referred to me from Dr.weitz Fatima is a 2018 [facility] patient .     Chief Complaint:  A1c elevated .  Cost issues as well are a great concern.     Question #1--pcp wants him to start on insulin --he really doesn't want to start this.    He also states he doesn't like taking all these pills he is on - doesn't fill his best when he does all of that .    Question #2--TURP on 1-23-19.   Will pcp pass him --most likely not with a1c on 10.5%    Question #3-- his chiropracter recommended rice and fruit diet ??     Question #4-- Why is their no pill to reduce his craving for sugar.       Personal Healthcare Goals:  Fix a1c to <8% to have TURP and avoid insulin if he can!    Allergies/ADRs: Reviewed in Epic  Tobacco: No tobacco use  Alcohol: not currently using  Caffeine: 2 cups/day of coffee-if not painting , 1 cup of tea prn.   Activity: working out 4 days /week --[employer] - walking , jogging, weights   PMH: Reviewed in Epic    Medication Adherence/Access:  no issues reported    Diabetes:  Pt currently taking glipizide 10mg bid . Pt is not experiencing side effects.  SMBG: maybe once /day .   Ranges (patient reported): 160- 2 hours after working out this am after sotelo and protein powder.   Patient is not experiencing hypoglycemia  Recent symptoms of high blood sugar? none  Eye exam: up to date  Foot exam: due  ACEi/ARB: Yes: Lisinopril 2.5mg./day .    Urine Albumin:   Lab Results   Component Value Date    UMALCR 5.14 11/05/2018      Aspirin: Not taking due to age  Diet/Exercise: exercise -see activity above,  Diet wise he is  sugar addicted he states .       Lab Results   Component Value Date    A1C 10.5 11/05/2018    A1C 8.3 10/10/2017    A1C 8.1 03/20/2017    A1C 9.2 11/21/2016    A1C 9.7 07/06/2016         Hyperlipidemia: Current therapy includes Pravastatin 20mg  "once daily.  Pt reports no significant myalgias or other side effects.  The 10-year ASCVD risk score (Beckycaprice ARMANDO Jr, et al., 2013) is: 42%    Values used to calculate the score:      Age: 73 years      Sex: Male      Is Non- : No      Diabetic: Yes      Tobacco smoker: No      Systolic Blood Pressure: 122 mmHg      Is BP treated: Yes      HDL Cholesterol: 36 mg/dL      Total Cholesterol: 140 mg/dL    Recent Labs   Lab Test  11/05/18   1455  10/10/17   1555   01/14/13   0850  01/05/12   0827   CHOL  140  132   --   158  143   HDL  36*  40   --   37*  36*   LDL  75  66   < >  89  84   TRIG  147  128   --   160*  113   CHOLHDLRATIO   --    --    --   4.3  4.0    < > = values in this interval not displayed.             BP Readings from Last 1 Encounters:   12/03/18 122/60     Pulse Readings from Last 1 Encounters:   12/03/18 71     Wt Readings from Last 1 Encounters:   12/03/18 171 lb (77.6 kg)     Ht Readings from Last 1 Encounters:   11/05/18 5' 7\" (1.702 m)     Estimated body mass index is 26.78 kg/(m^2) as calculated from the following:    Height as of 11/5/18: 5' 7\" (1.702 m).    Weight as of this encounter: 171 lb (77.6 kg).    Temp Readings from Last 1 Encounters:   11/05/18 97.4  F (36.3  C) (Oral)         ASSESSMENT:                             Current medications were reviewed today.     Medication Adherence: excellent, no issues identified    Diabetes: Needs Improvement. Patient is not meeting A1c goal of < 8%. Self monitoring of blood glucose is not at goal of fasting  mg/dL and postprandial <160. Pt would benefit from minimum SMBG: Check blood sugars fasting, and occasionally 2 hours after starting a meal.  SFU (Glipizide) :  stay on the same dose.  Actos:  Start at dose : 30mg./day   Updated Hemoglobin A1c-10.5% now .  Weight loss recommended-gave patient my 1 page low carb diet .    FYI--We did discuss NPH insulin --he is averse to it at this point but may consider it if needed " to improve his a1c to allow for TURP surgery in 2019.      Hyperlipidemia: Stable. Pt is on moderate intensity statin which is indicated based on 2013 ACC/AHA guidelines for lipid management.        PLAN:    1.  Current A1c is 10.5% ---Please stay glipizide 10mg twice daily at b-fast and dinner , lets ADD - Pioglitazone 30mg tablet --once daily , start with 1/2 tablet once daily x 1 week --see how you tolerate it --then if ok --increase to 1 full tab daily thereafter.     Check blood sugars once daily --fasting in am --Goal is ,  Then check next day -2 hours after main meal --Goal is < 160.   A1c goal is 7.5% or less is optimum but <8% is acceptable.       Next MTM visit:   Monday December 31st at 9;30am --bring blood sugar meter .    I spent 50 minutes with this patient today. All changes were made via collaborative practice agreement with Dr. Huggins. A copy of the visit note was provided to the patient's primary care provider.        The patient was given a summary of these recommendations as an after visit summary.         Kim Webster [facility].  Medication Therapy Management Provider  [phone]

## 2018-12-04 RX ORDER — PIOGLITAZONEHYDROCHLORIDE 30 MG/1
TABLET ORAL
Qty: 90 TABLET | Refills: 1 | Status: SHIPPED | OUTPATIENT
Start: 2018-12-04 | End: 2019-07-22

## 2018-12-31 ENCOUNTER — OFFICE VISIT (OUTPATIENT)
Dept: PHARMACY | Facility: CLINIC | Age: 73
End: 2018-12-31
Payer: COMMERCIAL

## 2018-12-31 VITALS
SYSTOLIC BLOOD PRESSURE: 139 MMHG | DIASTOLIC BLOOD PRESSURE: 80 MMHG | WEIGHT: 175 LBS | OXYGEN SATURATION: 97 % | BODY MASS INDEX: 27.41 KG/M2 | HEART RATE: 63 BPM

## 2018-12-31 DIAGNOSIS — E78.5 HYPERLIPIDEMIA LDL GOAL <100: ICD-10-CM

## 2018-12-31 PROCEDURE — 99607 MTMS BY PHARM ADDL 15 MIN: CPT | Performed by: PHARMACIST

## 2018-12-31 PROCEDURE — 99606 MTMS BY PHARM EST 15 MIN: CPT | Performed by: PHARMACIST

## 2018-12-31 RX ORDER — GLIPIZIDE 10 MG/1
10 TABLET ORAL
Qty: 270 TABLET | Refills: 1 | Status: SHIPPED | OUTPATIENT
Start: 2018-12-31 | End: 2019-02-04

## 2018-12-31 NOTE — PATIENT INSTRUCTIONS
Recommendations from today's MTM visit:                                                        1. FYI--blood sugars are looking better --lets increase your glipizide to 10mg. --1 tab three times daily with main meals.   Stay on pioglitazone 30mg./day .    Check blood sugars 2 x day --before breakfast and 2 hours after any main meal.     Please consider reading The Diabetes Code   By Chaka Ceja --how to prevent and treat Type-2 diabetes --see if you find any good information in it to help you .     2. FYI--Usually we only give either flomax or doxasozin to help relax urethra -- but if your struggling to go to the bathroom -- ok to try both daily in PM as this seems         Next MTM visit:  Monday -February 4th -2019 - 10;00am --please bring blood sugar meter .     To schedule another MTM appointment, please call the clinic directly or you may call the MTM scheduling line at [phone] or toll-free at [phone].     My Clinical Pharmacist's contact information:                                                      It was a pleasure talking with you today!  Please feel free to contact me with any questions or concerns you have.      Kim Webster [facility].  Medication Therapy Management Provider  [phone]      You may receive a survey about the MTM services you received.  I would appreciate your feedback to help me serve you better in the future. Please fill it out and return it when you can. Your comments will be anonymous.

## 2018-12-31 NOTE — PROGRESS NOTES
SUBJECTIVE/OBJECTIVE:                           Kushal Bush is a 73 year old male coming in for an follow-up (12-3-18) visit for Medication Therapy Management.  He was referred to me from Dr.weitz Vasquezbarrera is a 2018 [facility] patient .     Chief Complaint:  Here for 3 weeks bs f/up.    Mad he had to can omar his turp procedure --struggling with Flomax /Cardura --states he has tried both on some days and felt improvement.       Personal Healthcare Goals:  Fix a1c to <8% to have TURP and avoid insulin if he can!    Allergies/ADRs: Reviewed in Epic  Tobacco: No tobacco use  Alcohol: not currently using  Caffeine: 2 cups/day of coffee-if not painting , 1 cup of tea prn.   Activity: working out 4 days /week --[employer] - walking , jogging, weights   PMH: Reviewed in Epic    Medication Adherence/Access:  no issues reported    Diabetes:  Pt currently taking glipizide 10mg. BID(prescribed ) but admits increased it toTid ac, pioglitazone 30mg ./day . Pt is not experiencing side effects.  SMB-2 x day .   Ranges (patient reported): see bg chart below.     Date FBG/ 2hours post Lunch/2hours post Dinner /2hours post    18 149      12-30 154 7am 132 4pm 191 10pm    1229 219 6am  108 6pm    1228 176 630am      12 219      12 180 630am  117 5pm       112 545pm          Date FBG/ 2hours post Lunch/2hours post Dinner /2hours post    18   131 630pm    12   96 5;15pm    12-22   105 5;35pm    12-21   116 520pm    12-20 187  201    12-19 183 645am      12-18 167 6am              Patient is not experiencing hypoglycemia  Recent symptoms of high blood sugar? none  Eye exam: up to date  Foot exam: due  ACEi/ARB: Yes: Lisinopril 2.5mg./day .    Urine Albumin:   Lab Results   Component Value Date    UMALCR 5.14 2018      Aspirin: Not taking due to age  Diet/Exercise: exercise -see activity above,  Diet wise he is  sugar addicted he states .       Lab Results   Component Value Date    A1C 10.5 2018    A1C  "8.3 10/10/2017    A1C 8.1 03/20/2017    A1C 9.2 11/21/2016    A1C 9.7 07/06/2016         Hyperlipidemia: Current therapy includes Pravastatin 20mg once daily.  Pt reports no significant myalgias or other side effects.  The 10-year ASCVD risk score (Becky ARMANDO Jr., et al., 2013) is: 49.7%    Values used to calculate the score:      Age: 73 years      Sex: Male      Is Non- : No      Diabetic: Yes      Tobacco smoker: No      Systolic Blood Pressure: 139 mmHg      Is BP treated: Yes      HDL Cholesterol: 36 mg/dL      Total Cholesterol: 140 mg/dL    Recent Labs   Lab Test  11/05/18   1455  10/10/17   1555   01/14/13   0850  01/05/12   0827   CHOL  140  132   --   158  143   HDL  36*  40   --   37*  36*   LDL  75  66   < >  89  84   TRIG  147  128   --   160*  113   CHOLHDLRATIO   --    --    --   4.3  4.0    < > = values in this interval not displayed.             BP Readings from Last 1 Encounters:   12/31/18 139/80     Pulse Readings from Last 1 Encounters:   12/31/18 63     Wt Readings from Last 1 Encounters:   12/31/18 175 lb (79.4 kg)     Ht Readings from Last 1 Encounters:   11/05/18 5' 7\" (1.702 m)     Estimated body mass index is 27.41 kg/m  as calculated from the following:    Height as of 11/5/18: 5' 7\" (1.702 m).    Weight as of this encounter: 175 lb (79.4 kg).    Temp Readings from Last 1 Encounters:   11/05/18 97.4  F (36.3  C) (Oral)           ASSESSMENT:                             Current medications were reviewed today.     Medication Adherence: excellent, no issues identified    Diabetes: Improving/Needs Improvement. Patient is not meeting A1c goal of < 8%. Self monitoring of blood glucose is not at goal of fasting  mg/dL and postprandial <160. Pt would benefit from minimum SMBG: Check blood sugars fasting, and occasionally 2 hours after starting a meal.  SFU (Glipizide) :  Increase dose to 10mg tid ac.   Actos:  Stay on 30mg./day .     Weight loss recommended-suggested " patient read the diabetes code book -see plan.         Hyperlipidemia: Stable. Pt is on moderate intensity statin which is indicated based on 2013 ACC/AHA guidelines for lipid management.        PLAN:    1. FYI--blood sugars are looking better --lets increase your glipizide to 10mg. --1 tab three times daily with main meals.   Stay on pioglitazone 30mg./day .    Check blood sugars 2 x day --before breakfast and 2 hours after any main meal.     Please consider reading The Diabetes Code   By Chaka Ceja --how to prevent and treat Type-2 diabetes --see if you find any good information in it to help you .     2. FYI--Usually we only give either flomax or doxasozin to help relax urethra -- but if your struggling to go to the bathroom -- ok to try both daily in PM as this seems         Next [facility] visit:  Monday -February 4th -2019 - 10;00am --please bring blood sugar meter .   I spent 50 minutes with this patient today. All changes were made via collaborative practice agreement with Dr. Huggins. A copy of the visit note was provided to the patient's primary care provider.        The patient was given a summary of these recommendations as an after visit summary.         Kim Webster [facility].  Medication Therapy Management Provider  [phone]

## 2019-02-04 ENCOUNTER — OFFICE VISIT (OUTPATIENT)
Dept: PHARMACY | Facility: CLINIC | Age: 74
End: 2019-02-04
Payer: COMMERCIAL

## 2019-02-04 VITALS
WEIGHT: 179.4 LBS | BODY MASS INDEX: 28.1 KG/M2 | HEART RATE: 60 BPM | SYSTOLIC BLOOD PRESSURE: 112 MMHG | DIASTOLIC BLOOD PRESSURE: 62 MMHG | OXYGEN SATURATION: 95 %

## 2019-02-04 DIAGNOSIS — E78.5 HYPERLIPIDEMIA LDL GOAL <100: ICD-10-CM

## 2019-02-04 DIAGNOSIS — I10 ESSENTIAL HYPERTENSION: ICD-10-CM

## 2019-02-04 PROCEDURE — 99607 MTMS BY PHARM ADDL 15 MIN: CPT | Performed by: PHARMACIST

## 2019-02-04 PROCEDURE — 99605 MTMS BY PHARM NP 15 MIN: CPT | Performed by: PHARMACIST

## 2019-02-04 RX ORDER — GLIPIZIDE 10 MG/1
10 TABLET ORAL
Qty: 270 TABLET | Refills: 1 | Status: SHIPPED | OUTPATIENT
Start: 2019-02-04 | End: 2019-08-10

## 2019-02-04 NOTE — Clinical Note
tavo--he's doing better on the diabetes book I gave him --making diet changes etc.--I think a1c will be much improved at next recheck.-Kim

## 2019-02-04 NOTE — PATIENT INSTRUCTIONS
Recommendations from today's MTM visit:                                                        1. FYI--Blood sugars look much improved -- keep following the diabetes code and fasting suggestions and keep taking pioglitazone and glipizide as is , check fasting AM blood sugar --try to check a few 2 hour AFTER meal blood sugars during the week.        Next MTM visit:  See me in 1 month for blood sugar recheck- Monday - March 4th -2019 at 10;00am .   Please bring blood sugar meter to your visit.     To schedule another MTM appointment, please call the clinic directly or you may call the MTM scheduling line at [phone] or toll-free at [phone].     My Clinical Pharmacist's contact information:                                                      It was a pleasure talking with you today!  Please feel free to contact me with any questions or concerns you have.      Kim Webster [facility].  Medication Therapy Management Provider  [phone]      You may receive a survey about the MTM services you received.  I would appreciate your feedback to help me serve you better in the future. Please fill it out and return it when you can. Your comments will be anonymous.

## 2019-02-04 NOTE — PROGRESS NOTES
"SUBJECTIVE/OBJECTIVE:                           Kushal Bush is a 73 year old male coming in for an follow-up (18) visit for Medication Therapy Management.  He was referred to me from Dr.weitz Fatima is a 2019 [facility] patient .     Chief Complaint:  Here for 5 weeks bs f/up.      He is reading the \"the diabetes code \" and trying fasting as they suggest - trying to reduce anything from 6pm-6am.   If he eats a small b-fast and lunch--that seems to work for him and a low carb dinner.             He's Mad he had to cancel his turp procedure(a1c too high) --struggling with Flomax /Cardura --states he has tried both on some days and felt improvement.     Personal Healthcare Goals:  Fix a1c to <8% to have TURP and avoid insulin if he can!    Allergies/ADRs: Reviewed in Epic  Tobacco: No tobacco use  Alcohol: not currently using  Caffeine: 2 cups/day of coffee-if not painting , 1 cup of tea prn.   Activity: working out 4 days /week --[employer] - walking , jogging, weights   PMH: Reviewed in Epic    Medication Adherence/Access:  no issues reported    Diabetes:  Pt currently taking glipizide 10mg. TID(prescribed ) but admits increased it sometime to QID pc--am ;lunch afternoon and hs-rare  , pioglitazone 30mg ./day . Pt is not experiencing side effects.  SMB-2 x day .   Ranges (patient reported): see bg chart below.       Date FBG/ 2hours post Lunch/2hours post Pre-Dinner /2hours post    19 155 630am      2-3-19   98 5pm    19 154 630am  197 5pm    19 152 6am  75 4pm    19   108 5pm    19   112 6pm        Date FBG/ 2hours post Lunch/2hours post Dinner /2hours post    19   95 4pm    19   70 530pm--lowest bs --felt ok -a little shaky -ate something    19   87 530pm    19 143 630am  93 5pm    19 141 7am  158 6pm                            Date FBG/ 2hours post Lunch/2hours post Dinner /2hours post    18 149       154 7am 132 4pm 191 10pm     219 " 6am  108 6pm    12-28 176 630am      12-27 219      12-26 180 630am  117 5pm    12-25   112 545pm          Date FBG/ 2hours post Lunch/2hours post Dinner /2hours post    12-24-18   131 630pm    12-23   96 5;15pm    12-22   105 5;35pm    12-21   116 520pm    12-20 187  201    12-19 183 645am      12-18 167 6am              Patient is not experiencing hypoglycemia  Recent symptoms of high blood sugar? none  Eye exam: up to date  Foot exam: due  ACEi/ARB: Yes: Lisinopril 2.5mg./day .    Urine Albumin:   Lab Results   Component Value Date    UMALCR 5.14 11/05/2018      Aspirin: Not taking due to age  Diet/Exercise: exercise -see activity above,  Diet wise he is  sugar addicted he states .       Lab Results   Component Value Date    A1C 10.5 11/05/2018    A1C 8.3 10/10/2017    A1C 8.1 03/20/2017    A1C 9.2 11/21/2016    A1C 9.7 07/06/2016         Hyperlipidemia: Current therapy includes Pravastatin 20mg once daily.  Pt reports no significant myalgias or other side effects.  The 10-year ASCVD risk score ([place] PRABHA Jr., et al., 2013) is: 37.3%    Values used to calculate the score:      Age: 73 years      Sex: Male      Is Non- : No      Diabetic: Yes      Tobacco smoker: No      Systolic Blood Pressure: 112 mmHg      Is BP treated: Yes      HDL Cholesterol: 36 mg/dL      Total Cholesterol: 140 mg/dL    Recent Labs   Lab Test  11/05/18   1455  10/10/17   1555   01/14/13   0850  01/05/12   0827   CHOL  140  132   --   158  143   HDL  36*  40   --   37*  36*   LDL  75  66   < >  89  84   TRIG  147  128   --   160*  113   CHOLHDLRATIO   --    --    --   4.3  4.0    < > = values in this interval not displayed.       Hypertension: Current medications include lisinopril 2.5mg qday , metoprolol 25mg -1/2 tab daily .  Patient does not self-monitor BP.  Patient reports no current medication side effects.    BP Readings from Last 3 Encounters:   02/04/19 112/62   12/31/18 139/80   12/03/18 122/60         BP  "Readings from Last 1 Encounters:   02/04/19 112/62     Pulse Readings from Last 1 Encounters:   02/04/19 60     Wt Readings from Last 1 Encounters:   02/04/19 179 lb 6.4 oz (81.4 kg)     Ht Readings from Last 1 Encounters:   11/05/18 5' 7\" (1.702 m)     Estimated body mass index is 28.1 kg/m  as calculated from the following:    Height as of 11/5/18: 5' 7\" (1.702 m).    Weight as of this encounter: 179 lb 6.4 oz (81.4 kg).    Temp Readings from Last 1 Encounters:   11/05/18 97.4  F (36.3  C) (Oral)           ASSESSMENT:                             Current medications were reviewed today.     Medication Adherence: excellent, no issues identified    Diabetes: Improving/Needs Improvement. Patient is not meeting A1c goal of < 8%. Self monitoring of blood glucose is not at goal of fasting  mg/dL and postprandial <160. Pt would benefit from minimum SMBG: Check blood sugars fasting, and occasionally 2 hours after starting a meal.  SFU (Glipizide) :  Stay on 10mg tid ac or pc and try to not take 4th tab at hs.   Actos:  Stay on 30mg./day .     Weight loss recommended-suggested patient read the diabetes code --he is doing great on this --keep going with it and only thing he isnt doing is checking 2 hour post meal blood sugars .         Hyperlipidemia: Stable. Pt is on moderate intensity statin which is indicated based on 2013 ACC/AHA guidelines for lipid management.      Hypertension: Stable. Patient is meeting BP goal of < 140/90mmHg.         PLAN:    1. FYI--Blood sugars look much improved -- keep following the diabetes code and fasting suggestions and keep taking pioglitazone and glipizide as is , check fasting AM blood sugar --try to check a few 2 hour AFTER meal blood sugars during the week.        Next MTM visit:  See me in 1 month for blood sugar recheck- Monday - March 4th -2019 at 10;00am .   Please bring blood sugar meter to your visit.   I spent 30 minutes with this patient today. All changes were made via " collaborative practice agreement with Dr. Huggins. A copy of the visit note was provided to the patient's primary care provider.        The patient was given a summary of these recommendations as an after visit summary.         Kim Webster [facility].  Medication Therapy Management Provider  [phone]

## 2019-02-27 ENCOUNTER — TRANSFERRED RECORDS (OUTPATIENT)
Dept: HEALTH INFORMATION MANAGEMENT | Facility: CLINIC | Age: 74
End: 2019-02-27

## 2019-03-03 NOTE — PROGRESS NOTES
"SUBJECTIVE/OBJECTIVE:                           Kushal Bush is a 73 year old male coming in for an follow-up (19) visit for Medication Therapy Management.  He was referred to me from Dr.weitz Fatima is a 2019 [facility] patient .     Chief Complaint:  Here for 4 weeks bs f/up.      He is reading the \"the diabetes code \" and trying fasting as they suggest - trying to reduce anything from 6pm-6am.   If he eats a small b-fast and lunch--that seems to work for him and a low carb dinner.     He is reading Fat for fuel -dr. louis .  He finds the book very confusing /conflicting ideas.             He's Mad he had to cancel his turp procedure(a1c too high) --struggling with Flomax /Cardura --states he has tried both on some days and felt improvement.     Personal Healthcare Goals:  Fix a1c to <8% to have TURP and avoid insulin if he can!    Allergies/ADRs: Reviewed in Epic  Tobacco: No tobacco use  Alcohol: not currently using  Caffeine: 2 cups/day of coffee-if not painting , 1 cup of tea prn.   Activity: working out 4 days /week --[employer] - walking , jogging, weights   PMH: Reviewed in Epic    Medication Adherence/Access:  no issues reported    Diabetes:  Pt currently taking glipizide 10mg. TID(prescribed ) but admits increased it sometime to QID pc--am ;lunch afternoon and hs-rare  , pioglitazone 30mg ./day . Pt is not experiencing side effects.  SMB-2 x day .   Ranges (patient reported): see bg chart below.     30days avg. =146.  14 days =163.         Date FBG/ 2hours post Lunch/2hours post Dinner /2hours post    3-4-19 212 617am(celebration after wifes concert)      3-3-19   109 607pm    3-2-19 162 634am      19 154 617am      19 146 6-2am      19 141 613am  94 529pm    19 146 7am            Date FBG/ 2hours post Lunch/2hours post Pre-Dinner /2hours post    19 155 630am      2-3-19   98 5pm    19 154 630am  197 5pm    19 152 6am  75 4pm    19   108 5pm  "   1-26-19   112 6pm        Date FBG/ 2hours post Lunch/2hours post Dinner /2hours post    1-25-19   95 4pm    1-24-19   70 530pm--lowest bs --felt ok -a little shaky -ate something    1-22-19   87 530pm    1-21-19 143 630am  93 5pm    1-20-19 141 7am  158 6pm                            Date FBG/ 2hours post Lunch/2hours post Dinner /2hours post    12-31-18 149      12-30 154 7am 132 4pm 191 10pm    12-29 219 6am  108 6pm    12-28 176 630am      12-27 219      12-26 180 630am  117 5pm    12-25   112 545pm          Date FBG/ 2hours post Lunch/2hours post Dinner /2hours post    12-24-18   131 630pm    12-23   96 5;15pm    12-22   105 5;35pm    12-21   116 520pm    12-20 187  201    12-19 183 645am      12-18 167 6am              Patient is not experiencing hypoglycemia  Recent symptoms of high blood sugar? none  Eye exam: up to date  Foot exam: due  ACEi/ARB: Yes: Lisinopril 2.5mg./day .    Urine Albumin:   Lab Results   Component Value Date    UMALCR 5.14 11/05/2018      Aspirin: Not taking due to age  Diet/Exercise: exercise -see activity above,  Diet wise he is  sugar addicted he states .       Lab Results   Component Value Date    A1C 10.5 11/05/2018    A1C 8.3 10/10/2017    A1C 8.1 03/20/2017    A1C 9.2 11/21/2016    A1C 9.7 07/06/2016         Hyperlipidemia: Current therapy includes Pravastatin 20mg once daily.  Pt reports no significant myalgias or other side effects.  The 10-year ASCVD risk score ([place] PRABHA Jr., et al., 2013) is: 41%    Values used to calculate the score:      Age: 73 years      Sex: Male      Is Non- : No      Diabetic: Yes      Tobacco smoker: No      Systolic Blood Pressure: 120 mmHg      Is BP treated: Yes      HDL Cholesterol: 36 mg/dL      Total Cholesterol: 140 mg/dL    Recent Labs   Lab Test  11/05/18   1455  10/10/17   1555   01/14/13   0850  01/05/12   0827   CHOL  140  132   --   158  143   HDL  36*  40   --   37*  36*   LDL  75  66   < >  89  84   TRIG  147   "128   --   160*  113   CHOLHDLRATIO   --    --    --   4.3  4.0    < > = values in this interval not displayed.       Hypertension: Current medications include lisinopril 2.5mg qday , metoprolol 25mg -1/2 tab daily .  Patient does not self-monitor BP.  Patient reports no current medication side effects.    BP Readings from Last 3 Encounters:   03/04/19 120/76   02/04/19 112/62   12/31/18 139/80         .  BP Readings from Last 1 Encounters:   03/04/19 120/76     Pulse Readings from Last 1 Encounters:   03/04/19 60     Wt Readings from Last 1 Encounters:   03/04/19 171 lb (77.6 kg)     Ht Readings from Last 1 Encounters:   11/05/18 5' 7\" (1.702 m)     Estimated body mass index is 26.78 kg/m  as calculated from the following:    Height as of 11/5/18: 5' 7\" (1.702 m).    Weight as of this encounter: 171 lb (77.6 kg).    Temp Readings from Last 1 Encounters:   11/05/18 97.4  F (36.3  C) (Oral)           ASSESSMENT:                             Current medications were reviewed today.     Medication Adherence: excellent, no issues identified    Diabetes: Improving/Needs Improvement. Patient is not meeting A1c goal of < 8%. Self monitoring of blood glucose is not at goal of fasting  mg/dL and postprandial <160. Pt would benefit from minimum SMBG: Check blood sugars fasting, and occasionally 2 hours after starting a meal.  SFU (Glipizide) :  Stay on 10mg tid ac or pc and try to not take 4th tab at hs.   Actos:  Stay on 30mg./day .     Weight loss recommended-suggested patient read the diabetes code --he is doing great on this --keep going with it and only thing he isnt doing is checking 2 hour post meal blood sugars .         Hyperlipidemia: Stable. Pt is on moderate intensity statin which is indicated based on 2013 ACC/AHA guidelines for lipid management.      Hypertension: Stable. Patient is meeting BP goal of < 140/90mmHg.         PLAN:    1. Blood sugars improving --keep working on the Diabetes code and fat for " fuel books -- your making great diet changes , keep exercising daily.     Stay on same diabetes meds for now .    Lets do  A1c lab next visit.     2. FYI--please research pre-bio thrive --prebiotic fiber supplement --I use it --works great .       Next MT visit:  See me Monday April 1st -2019 at 9;30am.   I spent 30 minutes with this patient today. All changes were made via collaborative practice agreement with Dr. Huggins. A copy of the visit note was provided to the patient's primary care provider.        The patient was given a summary of these recommendations as an after visit summary.         Kim Webster [facility].  Medication Therapy Management Provider  [phone]

## 2019-03-04 ENCOUNTER — OFFICE VISIT (OUTPATIENT)
Dept: PHARMACY | Facility: CLINIC | Age: 74
End: 2019-03-04
Payer: COMMERCIAL

## 2019-03-04 VITALS
DIASTOLIC BLOOD PRESSURE: 76 MMHG | SYSTOLIC BLOOD PRESSURE: 120 MMHG | WEIGHT: 171 LBS | HEART RATE: 60 BPM | OXYGEN SATURATION: 96 % | BODY MASS INDEX: 26.78 KG/M2

## 2019-03-04 DIAGNOSIS — E78.5 HYPERLIPIDEMIA LDL GOAL <100: ICD-10-CM

## 2019-03-04 DIAGNOSIS — I10 ESSENTIAL HYPERTENSION: ICD-10-CM

## 2019-03-04 PROCEDURE — 99606 MTMS BY PHARM EST 15 MIN: CPT | Performed by: PHARMACIST

## 2019-03-04 PROCEDURE — 99607 MTMS BY PHARM ADDL 15 MIN: CPT | Performed by: PHARMACIST

## 2019-03-04 NOTE — PATIENT INSTRUCTIONS
Recommendations from today's MTM visit:                                                        1. Blood sugars improving --keep working on the Diabetes code and fat for fuel books -- your making great diet changes , keep exercising daily.     Stay on same diabetes meds for now .    Lets do A1c lab next visit.     2. FYI--please research pre-bio thrive --prebiotic fiber supplement --I use it --works great .       Next MTM visit:  See me Monday April 1st -2019 at 9;30am.     To schedule another MTM appointment, please call the clinic directly or you may call the MTM scheduling line at [phone] or toll-free at [phone].     My Clinical Pharmacist's contact information:                                                      It was a pleasure talking with you today!  Please feel free to contact me with any questions or concerns you have.      Kim Webster [facility].  Medication Therapy Management Provider  [phone]      You may receive a survey about the MTM services you received by email and/or US Mail.  I would appreciate your feedback to help me serve you better in the future. Your comments will be anonymous.

## 2019-03-04 NOTE — Clinical Note
a1c recheck 4-1-19--lets see if he is surgery eligible then?Thx.Kim Webster [facility].Medication Therapy Management [phone]

## 2019-03-11 ENCOUNTER — TELEPHONE (OUTPATIENT)
Dept: FAMILY MEDICINE | Facility: CLINIC | Age: 74
End: 2019-03-11

## 2019-03-31 NOTE — PROGRESS NOTES
SUBJECTIVE/OBJECTIVE:                           Kushal Bush is a 73 year old male coming in for an follow-up (3-4-19) visit for Medication Therapy Management.  He was referred to me from     Kushal is a 2019 [facility] patient .     Chief Complaint:  Here for A1c  Lab recheck. He forgot his bs meter today for this visit.              He's Mad he had to cancel his turp procedure(a1c too high) --struggling with Flomax /Cardura --states he has tried both on some days and felt improvement.     Personal Healthcare Goals:  Fix a1c to <8% to have TURP and avoid insulin if he can!    Allergies/ADRs: Reviewed in Epic  Tobacco: No tobacco use  Alcohol: not currently using  Caffeine: 2 cups/day of coffee-if not painting , 1 cup of tea prn.   Activity: working out 4 days /week --[employer] - walking , jogging, weights   PMH: Reviewed in Epic    Medication Adherence/Access:  no issues reported    Diabetes:  Pt currently taking glipizide 10mg. TID(prescribed ) but admits increased it sometime to QID pc--am ;lunch afternoon and hs-rare  , pioglitazone 30mg ./day . Pt is not experiencing side effects.  SMB-2 x day .   Ranges (patient reported): see bg chart below.       He reports some recent fasting am bs's --mostly <130. Today on 19.         30days avg. =146.  14 days =163.         Date FBG/ 2hours post Lunch/2hours post Dinner /2hours post    3-4-19 212 617am(celebration after wifes concert)      3-3-19   109 607pm    3-2-19 162 634am      19 154 617am      19 146 6-2am      19 141 613am  94 529pm    19 146 7am                Patient is not experiencing hypoglycemia  Recent symptoms of high blood sugar? none  Eye exam: up to date  Foot exam: due  ACEi/ARB: Yes: Lisinopril 2.5mg./day .    Urine Albumin:   Lab Results   Component Value Date    UMALCR 5.14 2018      Aspirin: Not taking due to age  Diet/Exercise: exercise -see activity above,  Diet wise he is  sugar addicted he states --but he  "took my suggestion of reading the \" diabetes code book \" and is trying some intermittent fasting with lower carb diet and its working.       Lab Results   Component Value Date    A1C 7.8 04/01/2019    A1C 10.5 11/05/2018    A1C 8.3 10/10/2017    A1C 8.1 03/20/2017    A1C 9.2 11/21/2016         Hyperlipidemia: Current therapy includes Pravastatin 20mg once daily.  Pt reports no significant myalgias or other side effects.  The 10-year ASCVD risk score (Becky ARMANDO Jr., et al., 2013) is: 27%    Values used to calculate the score:      Age: 73 years      Sex: Male      Is Non- : No      Diabetic: Yes      Tobacco smoker: No      Systolic Blood Pressure: 90 mmHg      Is BP treated: Yes      HDL Cholesterol: 36 mg/dL      Total Cholesterol: 140 mg/dL    Recent Labs   Lab Test  11/05/18   1455  10/10/17   1555   01/14/13   0850  01/05/12   0827   CHOL  140  132   --   158  143   HDL  36*  40   --   37*  36*   LDL  75  66   < >  89  84   TRIG  147  128   --   160*  113   CHOLHDLRATIO   --    --    --   4.3  4.0    < > = values in this interval not displayed.       Hypertension: Current medications include lisinopril 2.5mg qday , metoprolol 25mg -1/2 tab daily .  Patient does not self-monitor BP.  Patient reports no current medication side effects.    BP Readings from Last 3 Encounters:   04/01/19 90/70   03/04/19 120/76   02/04/19 112/62         .  BP Readings from Last 1 Encounters:   04/01/19 90/70     Pulse Readings from Last 1 Encounters:   04/01/19 60     Wt Readings from Last 1 Encounters:   04/01/19 177 lb 12.8 oz (80.6 kg)     Ht Readings from Last 1 Encounters:   11/05/18 5' 7\" (1.702 m)     Estimated body mass index is 27.85 kg/m  as calculated from the following:    Height as of 11/5/18: 5' 7\" (1.702 m).    Weight as of this encounter: 177 lb 12.8 oz (80.6 kg).    Temp Readings from Last 1 Encounters:   11/05/18 97.4  F (36.3  C) (Oral)           ASSESSMENT:                             Current " medications were reviewed today.     Medication Adherence: excellent, no issues identified    Diabetes: Stable. Patient is meeting A1c goal of < 8%. Self monitoring of blood glucose is at goal of fasting  mg/dL and postprandial <160. Pt would benefit from minimum SMBG: Check blood sugars fasting, and occasionally 2 hours after starting a meal.  SFU (Glipizide) :  Stay on 10mg tid ac and if hypoglycemia occurs we will reduce dose by 50%.  Actos:  Stay on 30mg./day .     Weight loss recommended-suggested patient read the diabetes code --he is doing great on this --keep going with it and only thing he isnt doing is checking 2 hour post meal blood sugars .         Hyperlipidemia: Stable. Pt is on moderate intensity statin which is indicated based on 2013 ACC/AHA guidelines for lipid management.      Hypertension: Stable. Patient is meeting BP goal of < 140/90mmHg.         PLAN:        1. A1c today = 7.8% --down from 10.5% --congratulations --keep up great work your doing diet and exercise wise.   You are now TURP surgery eligible.         Next MT visit:   Monday July 22nd -2019 at 9;30am --repeat A1c lab .             I spent 30 minutes with this patient today. All changes were made via collaborative practice agreement with Dr. Huggins. A copy of the visit note was provided to the patient's primary care provider.        The patient was given a summary of these recommendations as an after visit summary.         Kim Webster Rph.  Medication Therapy Management Provider  [phone]

## 2019-04-01 ENCOUNTER — OFFICE VISIT (OUTPATIENT)
Dept: PHARMACY | Facility: CLINIC | Age: 74
End: 2019-04-01
Payer: COMMERCIAL

## 2019-04-01 VITALS
OXYGEN SATURATION: 96 % | DIASTOLIC BLOOD PRESSURE: 70 MMHG | WEIGHT: 177.8 LBS | BODY MASS INDEX: 27.85 KG/M2 | SYSTOLIC BLOOD PRESSURE: 90 MMHG | HEART RATE: 60 BPM

## 2019-04-01 DIAGNOSIS — I10 ESSENTIAL HYPERTENSION: ICD-10-CM

## 2019-04-01 DIAGNOSIS — E78.5 HYPERLIPIDEMIA LDL GOAL <100: ICD-10-CM

## 2019-04-01 LAB — HBA1C MFR BLD: 7.8 % (ref 0–5.6)

## 2019-04-01 PROCEDURE — 83036 HEMOGLOBIN GLYCOSYLATED A1C: CPT | Performed by: FAMILY MEDICINE

## 2019-04-01 PROCEDURE — 36415 COLL VENOUS BLD VENIPUNCTURE: CPT | Performed by: FAMILY MEDICINE

## 2019-04-01 PROCEDURE — 99607 MTMS BY PHARM ADDL 15 MIN: CPT | Performed by: PHARMACIST

## 2019-04-01 PROCEDURE — 99606 MTMS BY PHARM EST 15 MIN: CPT | Performed by: PHARMACIST

## 2019-04-01 NOTE — PATIENT INSTRUCTIONS
Recommendations from today's MTM visit:                                                        1. A1c today = 7.8% --down from 10.5% --congratulations --keep up great work your doing diet and exercise wise.   You are now surgery eligible.         Next MTM visit:   Monday July 22nd -2019 at 9;30am --repeat A1c lab .     To schedule another MTM appointment, please call the clinic directly or you may call the MTM scheduling line at [phone] or toll-free at [phone].     My Clinical Pharmacist's contact information:                                                      It was a pleasure talking with you today!  Please feel free to contact me with any questions or concerns you have.      Kim Webster Rph.  Medication Therapy Management Provider  [phone]      You may receive a survey about the MTM services you received by email and/or US Mail.  I would appreciate your feedback to help me serve you better in the future. Your comments will be anonymous.

## 2019-04-29 ENCOUNTER — TRANSFERRED RECORDS (OUTPATIENT)
Dept: HEALTH INFORMATION MANAGEMENT | Facility: CLINIC | Age: 74
End: 2019-04-29

## 2019-04-29 LAB — RETINOPATHY: NEGATIVE

## 2019-04-30 DIAGNOSIS — I10 ESSENTIAL HYPERTENSION WITH GOAL BLOOD PRESSURE LESS THAN 140/90: ICD-10-CM

## 2019-04-30 DIAGNOSIS — N40.0 BENIGN NON-NODULAR PROSTATIC HYPERPLASIA WITHOUT LOWER URINARY TRACT SYMPTOMS: ICD-10-CM

## 2019-04-30 DIAGNOSIS — R07.89 ATYPICAL CHEST PAIN: ICD-10-CM

## 2019-04-30 NOTE — TELEPHONE ENCOUNTER
"tamsulosin (FLOMAX) 0.4 MG capsule  Last Written Prescription Date:  11/07/18  Last Fill Quantity: 180 capsule,  # refills: 1   Last office visit: 11/5/2018 with prescribing provider:  Joseluis Julian Office Visit:       metoprolol tartrate (LOPRESSOR) 25 MG tablet  Last Written Prescription Date:  11/05/18  Last Fill Quantity: 45 tablet,  # refills: 1   Last office visit: 11/5/2018 with prescribing provider:  Joseluis Julian Office Visit:   Next 5 appointments (look out 90 days)    Jul 22, 2019  9:30 AM CDT  SHORT with Kim Webster [facility] ([facility]) [address] [facility] [address] [phone]             Requested Prescriptions   Pending Prescriptions Disp Refills     tamsulosin (FLOMAX) 0.4 MG capsule [Pharmacy Med Name: TAMSULOSIN 0.4MG CAPSULES] 180 capsule 0     Sig: TAKE 2 CAPSULES(0.8 MG) BY MOUTH DAILY       Alpha Blockers Passed - 4/30/2019  8:55 AM        Passed - Blood pressure under 140/90 in past 12 months     BP Readings from Last 3 Encounters:   04/01/19 90/70   03/04/19 120/76   02/04/19 112/62                 Passed - Recent (12 mo) or future (30 days) visit within the authorizing provider's specialty     Patient had office visit in the last 12 months or has a visit in the next 30 days with authorizing provider or within the authorizing provider's specialty.  See \"Patient Info\" tab in inbasket, or \"Choose Columns\" in Meds & Orders section of the refill encounter.              Passed - Patient does not have Tadalafil, Vardenafil, or Sildenafil on their medication list        Passed - Medication is active on med list        Passed - Patient is 18 years of age or older        metoprolol tartrate (LOPRESSOR) 25 MG tablet [Pharmacy Med Name: METOPROLOL TARTRATE 25MG TABLETS] 45 tablet 0     Sig: TAKE 1/2 TABLET(12.5 MG) BY MOUTH DAILY       Beta-Blockers Protocol Passed - 4/30/2019  8:55 AM        Passed - Blood pressure under 140/90 in " "past 12 months     BP Readings from Last 3 Encounters:   04/01/19 90/70   03/04/19 120/76   02/04/19 112/62                 Passed - Patient is age 6 or older        Passed - Recent (12 mo) or future (30 days) visit within the authorizing provider's specialty     Patient had office visit in the last 12 months or has a visit in the next 30 days with authorizing provider or within the authorizing provider's specialty.  See \"Patient Info\" tab in inbasket, or \"Choose Columns\" in Meds & Orders section of the refill encounter.              Passed - Medication is active on med list          "

## 2019-05-01 RX ORDER — METOPROLOL TARTRATE 25 MG/1
TABLET, FILM COATED ORAL
Qty: 45 TABLET | Refills: 1 | Status: SHIPPED | OUTPATIENT
Start: 2019-05-01 | End: 2019-12-31

## 2019-05-01 RX ORDER — TAMSULOSIN HYDROCHLORIDE 0.4 MG/1
CAPSULE ORAL
Qty: 180 CAPSULE | Refills: 1 | Status: SHIPPED | OUTPATIENT
Start: 2019-05-01 | End: 2019-12-31

## 2019-05-01 NOTE — TELEPHONE ENCOUNTER
Prescription approved per [facility] Refill Protocol.    Leeann Davis RN -- [facility]

## 2019-05-06 ENCOUNTER — TELEPHONE (OUTPATIENT)
Dept: FAMILY MEDICINE | Facility: CLINIC | Age: 74
End: 2019-05-06

## 2019-05-06 RX ORDER — LANCETS
EACH MISCELLANEOUS
Qty: 100 EACH | Refills: 11 | Status: SHIPPED | OUTPATIENT
Start: 2019-05-06 | End: 2022-02-02

## 2019-05-06 RX ORDER — GLUCOSAMINE HCL/CHONDROITIN SU 500-400 MG
CAPSULE ORAL
Qty: 100 EACH | Refills: 3 | Status: SHIPPED | OUTPATIENT
Start: 2019-05-06 | End: 2022-02-02

## 2019-05-06 NOTE — TELEPHONE ENCOUNTER
Fax from [employer] that True Metrix test strips are not covered.  Please advise.  Dorothy Kraft RN      Phone- [phone]  ID- [id number]

## 2019-05-17 RX ORDER — PIOGLITAZONEHYDROCHLORIDE 30 MG/1
TABLET ORAL
Qty: 90 TABLET | Refills: 0 | Status: SHIPPED | OUTPATIENT
Start: 2019-05-17 | End: 2019-08-10

## 2019-05-17 NOTE — TELEPHONE ENCOUNTER
Prescription approved per [facility] Refill Protocol.  Has upcoming MTM visit.  Shelly Spencer RN

## 2019-05-17 NOTE — TELEPHONE ENCOUNTER
Last Written Prescription Date:  12/04/18  Last Fill Quantity: 90,  # refills: 1   Last office visit: 4/1/2019 with prescribing provider:  Dr Huggins    Future Office Visit:   Next 5 appointments (look out 90 days)    Jul 22, 2019  9:30 AM CDT  SHORT with Kim Webster RPH  [facility] ([facility]) [address] [facility] [address] [phone]             Requested Prescriptions   Pending Prescriptions Disp Refills     pioglitazone (ACTOS) 30 MG tablet [Pharmacy Med Name: PIOGLITAZONE 30MG TABLETS] 30 tablet 0     Sig: TAKE 1 TABLET(30 MG) BY MOUTH DAILY       Thiazolidinedione Agents (TZDs)  Passed - 5/17/2019  9:01 AM        Passed - Blood pressure less than 140/90 in past 6 months     BP Readings from Last 3 Encounters:   04/01/19 90/70   03/04/19 120/76   02/04/19 112/62                 Passed - Patient has documented LDL within the past 12 mos.     Recent Labs   Lab Test 11/05/18  1455   LDL 75             Passed - Patient has a normal ALT within the past 12 mos.     Recent Labs   Lab Test 11/05/18  1455   ALT 43             Passed - Patient has a normal AST within the past 12 mos.      Recent Labs   Lab Test 11/05/18  1455   AST 30             Passed - Patient has had a Microalbumin in the past 12 mos.     Recent Labs   Lab Test 11/05/18  1454   MICROL 11   UMALCR 5.14             Passed - Patient has documented A1c within the specified period of time.     If HgbA1C is 8 or greater, it needs to be on file within the past 3 months.  If less than 8, must be on file within the past 6 months.     Recent Labs   Lab Test 04/01/19  0931   A1C 7.8*             Passed - Diagnosis not CHF        Passed - Medication is active on med list        Passed - Patient is age 18 or older        Passed - Patient has a normal serum Creatinine in the past 12 months     Recent Labs   Lab Test 11/05/18  1455   CR 0.95             Passed - Recent (6 mo) or future (30 days) visit  "within the authorizing provider's specialty     Patient had office visit in the last 6 months or has a visit in the next 30 days with authorizing provider or within the authorizing provider's specialty.  See \"Patient Info\" tab in inbasket, or \"Choose Columns\" in Meds & Orders section of the refill encounter.              "

## 2019-06-18 DIAGNOSIS — N40.0 BENIGN NON-NODULAR PROSTATIC HYPERPLASIA WITHOUT LOWER URINARY TRACT SYMPTOMS: ICD-10-CM

## 2019-06-18 DIAGNOSIS — I10 ESSENTIAL HYPERTENSION WITH GOAL BLOOD PRESSURE LESS THAN 140/90: ICD-10-CM

## 2019-06-18 RX ORDER — DOXAZOSIN 4 MG/1
4 TABLET ORAL AT BEDTIME
Qty: 90 TABLET | Refills: 1 | OUTPATIENT
Start: 2019-06-18

## 2019-06-18 NOTE — TELEPHONE ENCOUNTER
Cardura  Routing refill request to provider for review/approval because:  Script is from 10/2017  A break in medication    Next 5 appointments (look out 90 days)    Jul 22, 2019  9:30 AM CDT  SHORT with Kim Webster RPH  [facility] ([facility]) [address] [facility] [address]  [phone]        Ana Millard, RN, BSN

## 2019-06-18 NOTE — TELEPHONE ENCOUNTER
"Last Written Prescription Date:  10/10/17  Last Fill Quantity: 90 tablet,  # refills: 1   Last office visit: 11/5/2018 with prescribing provider:  Joseluis   Future Office Visit:   Next 5 appointments (look out 90 days)    Jul 22, 2019  9:30 AM CDT  SHORT with Kim Webster RPH  [facility] ([facility]) [address] [address] [phone]         Requested Prescriptions   Pending Prescriptions Disp Refills     doxazosin (CARDURA) 4 MG tablet 90 tablet 1     Sig: Take 1 tablet (4 mg) by mouth At Bedtime       Alpha Blockers Passed - 6/18/2019  1:42 PM        Passed - Blood pressure under 140/90 in past 12 months     BP Readings from Last 3 Encounters:   04/01/19 90/70   03/04/19 120/76   02/04/19 112/62                 Passed - Recent (12 mo) or future (30 days) visit within the authorizing provider's specialty     Patient had office visit in the last 12 months or has a visit in the next 30 days with authorizing provider or within the authorizing provider's specialty.  See \"Patient Info\" tab in inbasket, or \"Choose Columns\" in Meds & Orders section of the refill encounter.              Passed - Patient does not have Tadalafil, Vardenafil, or Sildenafil on their medication list        Passed - Medication is active on med list        Passed - Patient is 18 years of age or older          "

## 2019-06-19 NOTE — TELEPHONE ENCOUNTER
Dr. Huggins sent below to pharmacy.  Dorothy Kraft, RN    Refused Medications      doxazosin (CARDURA) 4 MG tablet         Sig: Take 1 tablet (4 mg) by mouth At Bedtime    Disp:  90 tablet    Refills:  1    Start: 6/18/2019    Class: E-Prescribe    Refused by: Albert Huggins MD    Refusal reason: Adjustment in Therapy    For: Essential hypertension with goal blood pressure less than 140/90, Benign non-nodular prostatic hyperplasia without lower urinary tract symptoms        Fill requested from: [facility] [address] [place] [address] [id number] AT [place] [address]

## 2019-07-22 ENCOUNTER — OFFICE VISIT (OUTPATIENT)
Dept: PHARMACY | Facility: CLINIC | Age: 74
End: 2019-07-22
Payer: COMMERCIAL

## 2019-07-22 VITALS
OXYGEN SATURATION: 94 % | BODY MASS INDEX: 27.49 KG/M2 | HEART RATE: 62 BPM | DIASTOLIC BLOOD PRESSURE: 66 MMHG | SYSTOLIC BLOOD PRESSURE: 110 MMHG | WEIGHT: 175.5 LBS

## 2019-07-22 DIAGNOSIS — I10 ESSENTIAL HYPERTENSION: ICD-10-CM

## 2019-07-22 DIAGNOSIS — E55.9 VITAMIN D DEFICIENCY: ICD-10-CM

## 2019-07-22 DIAGNOSIS — E78.5 HYPERLIPIDEMIA LDL GOAL <100: ICD-10-CM

## 2019-07-22 LAB — HBA1C MFR BLD: 7.2 % (ref 0–5.6)

## 2019-07-22 PROCEDURE — 99606 MTMS BY PHARM EST 15 MIN: CPT | Performed by: PHARMACIST

## 2019-07-22 PROCEDURE — 83036 HEMOGLOBIN GLYCOSYLATED A1C: CPT | Performed by: FAMILY MEDICINE

## 2019-07-22 PROCEDURE — 36415 COLL VENOUS BLD VENIPUNCTURE: CPT | Performed by: FAMILY MEDICINE

## 2019-07-22 PROCEDURE — 99607 MTMS BY PHARM ADDL 15 MIN: CPT | Performed by: PHARMACIST

## 2019-07-22 NOTE — PATIENT INSTRUCTIONS
"Recommendations from today's MT visit:                                                        1. A1c today = 7.2% --congrats --your improving rapidly !!   Keep up great intermittent fasting diet and daily exercise.  Good sleep--8+ hrs./day will help your energy level.  If you want to start a daily vitamin -D pill--ok to take 2000 unit pill/day .    I can check your Vitamin- D level next office visit.     2. FYI--Only thing to change is less glipizide to avoid blood sugars <70--decrease to 1 -glipizide pill with breakfast and dinner , no more lunch pill.      1. Insulin is your body's fat storage hormone. When you eat meals high in carbohydrates your pancreas releases insulin to store the excess food energy as fat. By intermittent fasting, or eating all of your daily food in an 8 hour period while fasting the remaining 16, you reduce the amount of insulin your body is releasing, resulting in less storage of food energy as fat. Focusing on a diet lower as low in carbohydrate as you can tolerate will also help reduce the amount of insulin your pancreas releases.      2. When you feel hungry, try drinking a zero calorie liquid, like flavored water, first. If you still feel hungry, especially if it is outside of your eating window, try eating a low carbohydrate snack like pickles.      3. Check out [email or web address] or \"the diabetes code\" by Dr. Chaka Ceja for more details.          Next MT visit:  See me this fall- November- 4th -2019 at 9:50am.   If you want ok to fast for yearly labs.     To schedule another [facility] appointment, please call the clinic directly or you may call the MT scheduling line at [phone] or toll-free at [phone].     My Clinical Pharmacist's contact information:                                                      It was a pleasure talking with you today!  Please feel free to contact me with any questions or concerns you have.      Kim Webster Rph.  Medication Therapy Management " Provider  [phone]      You may receive a survey about the MTM services you received by email and/or US Mail.  I would appreciate your feedback to help me serve you better in the future. Your comments will be anonymous.

## 2019-08-10 NOTE — LETTER
August 12, 2019      Kushal Bush  [address] [address]        Dear Kushal,     We recently received a call from your pharmacy requesting a refill of Pioglitazone and Glipizide.     A review of your chart indicates that an appointment is required with your provider for yearly visit with Dr. Huggins by 11/5/19. Please call the clinic at [phone] to schedule your appointment.     We have authorized one refill of your medication to allow time for you to schedule your appointment.      Taking care of your health is important to us and ongoing visits with your provider are vital to your care.  We look forward to seeing you in the near future.     Sincerely,        Albert Huggins MD/Dorothy Kraft RN

## 2019-08-12 RX ORDER — GLIPIZIDE 10 MG/1
TABLET ORAL
Qty: 270 TABLET | Refills: 0 | Status: SHIPPED | OUTPATIENT
Start: 2019-08-12 | End: 2019-12-31

## 2019-08-12 RX ORDER — PIOGLITAZONEHYDROCHLORIDE 30 MG/1
TABLET ORAL
Qty: 90 TABLET | Refills: 0 | Status: SHIPPED | OUTPATIENT
Start: 2019-08-12 | End: 2019-12-31

## 2019-08-12 NOTE — TELEPHONE ENCOUNTER
Requested Prescriptions   Pending Prescriptions Disp Refills     pioglitazone (ACTOS) 30 MG tablet [Pharmacy Med Name: PIOGLITAZONE 30MG TABLETS]  Last Written Prescription Date:  5/17/2019  Last Fill Quantity: 90 tablet,  # refills: 0   Last office visit: 11/5/2018 with prescribing provider:  Joseluis   Future Office Visit:   Next 5 appointments (look out 90 days)    Nov 04, 2019  9:50 AM CST  Pharmacist visit with Kim Webster RPH, CR EXAM ROOM 30  [facility] ([facility]) [address] [facility] [address]  [phone]          90 tablet 0     Sig: TAKE 1 TABLET BY MOUTH ONCE DAILY       Thiazolidinedione Agents (TZDs)  Passed - 8/10/2019  6:25 PM        Passed - Blood pressure less than 140/90 in past 6 months     BP Readings from Last 3 Encounters:   07/22/19 110/66   04/01/19 90/70   03/04/19 120/76                 Passed - Patient has documented LDL within the past 12 mos.     Recent Labs   Lab Test 11/05/18  1455   LDL 75             Passed - Patient has a normal ALT within the past 12 mos.     Recent Labs   Lab Test 11/05/18  1455   ALT 43             Passed - Patient has a normal AST within the past 12 mos.      Recent Labs   Lab Test 11/05/18  1455   AST 30             Passed - Patient has had a Microalbumin in the past 12 mos.     Recent Labs   Lab Test 11/05/18  1454   MICROL 11   UMALCR 5.14             Passed - Patient has documented A1c within the specified period of time.     If HgbA1C is 8 or greater, it needs to be on file within the past 3 months.  If less than 8, must be on file within the past 6 months.     Recent Labs   Lab Test 07/22/19  0927   A1C 7.2*             Passed - Diagnosis not CHF        Passed - Medication is active on med list        Passed - Patient is age 18 or older        Passed - Patient has a normal serum Creatinine in the past 12 months     Recent Labs   Lab Test 11/05/18  1455   CR 0.95             Passed - Recent (6 mo)  "or future (30 days) visit within the authorizing provider's specialty     Patient had office visit in the last 6 months or has a visit in the next 30 days with authorizing provider or within the authorizing provider's specialty.  See \"Patient Info\" tab in inbasket, or \"Choose Columns\" in Meds & Orders section of the refill encounter.            glipiZIDE (GLUCOTROL) 10 MG tablet [Pharmacy Med Name: GLIPIZIDE 10MG TABLETS]  Last Written Prescription Date:  2/4/2019  Last Fill Quantity: 270 tablet,  # refills: 1   Last office visit: 11/5/2018 with prescribing provider:  Joseluis   Future Office Visit:   Next 5 appointments (look out 90 days)    Nov 04, 2019  9:50 AM CST  Pharmacist visit with Kim Webster RPH, CR EXAM ROOM 30  [facility] ([facility]) [address] [facility] [address]  [phone]          270 tablet 0     Sig: TAKE 1 TABLET(10 MG) BY MOUTH THREE TIMES DAILY BEFORE MEALS       Sulfonylurea Agents Passed - 8/10/2019  6:25 PM        Passed - Blood pressure less than 140/90 in past 6 months     BP Readings from Last 3 Encounters:   07/22/19 110/66   04/01/19 90/70   03/04/19 120/76                 Passed - Patient has documented LDL within the past 12 mos.     Recent Labs   Lab Test 11/05/18  1455   LDL 75             Passed - Patient has had a Microalbumin in the past 15 mos.     Recent Labs   Lab Test 11/05/18  1454   MICROL 11   UMALCR 5.14             Passed - Patient has documented A1c within the specified period of time.     If HgbA1C is 8 or greater, it needs to be on file within the past 3 months.  If less than 8, must be on file within the past 6 months.     Recent Labs   Lab Test 07/22/19  0927   A1C 7.2*             Passed - Medication is active on med list        Passed - Patient is age 18 or older        Passed - Patient has a recent creatinine (normal) within the past 12 mos.     Recent Labs   Lab Test 11/05/18  1455   CR 0.95             " "Passed - Recent (6 mo) or future (30 days) visit within the authorizing provider's specialty     Patient had office visit in the last 6 months or has a visit in the next 30 days with authorizing provider or within the authorizing provider's specialty.  See \"Patient Info\" tab in inbasket, or \"Choose Columns\" in Meds & Orders section of the refill encounter.              "

## 2019-09-05 ENCOUNTER — MYC MEDICAL ADVICE (OUTPATIENT)
Dept: PHARMACY | Facility: CLINIC | Age: 74
End: 2019-09-05

## 2019-09-05 NOTE — TELEPHONE ENCOUNTER
9-5-19    Malick Alvarez,    Last visit(7/22/19) you mentioned a protein shake that you take for breakfast in the morning and I did not write it down.  would you remind me what the supplement is so that I might order it over the internet?     Back from [place]....getting back on my fasting regimen.    Best wishes,  Ed      mtm will send ed protein shake info today .    Kim Webster [facility].  Medication Therapy Management Provider  [phone]  '

## 2019-10-21 ENCOUNTER — TELEPHONE (OUTPATIENT)
Dept: FAMILY MEDICINE | Facility: CLINIC | Age: 74
End: 2019-10-21

## 2019-10-21 NOTE — TELEPHONE ENCOUNTER
Dr. Huggins -   Patient has an appointment with MT on 11/4/19. Does he still need to see you? Please advise.    Cheyenne Sotelo, MSN, RN, PHN

## 2019-10-21 NOTE — TELEPHONE ENCOUNTER
Hunter will you fill for patient till appt with you, or should we sent to Joseluis when back in office? Would a phone visit be fine for a med check to get something in for patient to get refill if OV is required    Nehal Bullard/JOHN

## 2019-10-21 NOTE — TELEPHONE ENCOUNTER
I would recommend patient be seen by Dr. Huggins if possible. However, if cannot see him for some time, patient should keep his appt with me.  I have seen patient only 1 time 2 years ago for throat pain. I would recommend sending refill request to Dr. Huggins or his covering pool to see if refill is appropriate until follow up appt.     -Hunter Tomas, PAC

## 2019-10-21 NOTE — TELEPHONE ENCOUNTER
Please see ZB message, if patient has PAL have them follow up with patient    Nehal Bullard/JOHN

## 2019-10-21 NOTE — TELEPHONE ENCOUNTER
Reason for call:  Other   Patient called regarding (reason for call): appointment  Additional comments: Patient received a card indicating he needed an appt in order to renew his medications.  There are no appts with any [facility] PCP until the middle of November.  He will run out before then.  An appt was scheduled for him to see Hunter Tomas (his primary is Dr. Huggins), but he would like to see if an earlier appt or a med renewal can be done before that so he doesn't run out of his medications.    Phone number to reach patient:  Cell number on file:    Telephone Information:   Mobile [phone]         Best Time:  Anytime    Can we leave a detailed message on this number?  YES

## 2019-10-25 NOTE — TELEPHONE ENCOUNTER
TC/MA- please get on schedule with Dr. Huggins also since over a year since last provider visit.  Can cancel with Hunter and see Dr. Huggins when able.  Thanks, Dorothy Kraft RN

## 2019-10-25 NOTE — TELEPHONE ENCOUNTER
Yes, I would think so. However, access issues exist, and this pt can have care with MTM, the next visit with provider  Albert Huggins MD

## 2019-10-25 NOTE — TELEPHONE ENCOUNTER
Patient has not seen a clinic provider since 11/5/18.  Do they not need to be seen once a year in clinic?  Please advise.  Dorothy Kraft RN

## 2019-11-04 ENCOUNTER — OFFICE VISIT (OUTPATIENT)
Dept: PHARMACY | Facility: CLINIC | Age: 74
End: 2019-11-04
Payer: COMMERCIAL

## 2019-11-04 VITALS
OXYGEN SATURATION: 97 % | DIASTOLIC BLOOD PRESSURE: 66 MMHG | WEIGHT: 180.2 LBS | SYSTOLIC BLOOD PRESSURE: 110 MMHG | HEART RATE: 61 BPM | BODY MASS INDEX: 28.22 KG/M2

## 2019-11-04 DIAGNOSIS — E53.8 VITAMIN B12 DEFICIENCY (NON ANEMIC): ICD-10-CM

## 2019-11-04 DIAGNOSIS — E55.9 VITAMIN D DEFICIENCY: ICD-10-CM

## 2019-11-04 DIAGNOSIS — N40.0 BENIGN NON-NODULAR PROSTATIC HYPERPLASIA WITHOUT LOWER URINARY TRACT SYMPTOMS: ICD-10-CM

## 2019-11-04 DIAGNOSIS — Z23 ENCOUNTER FOR IMMUNIZATION: ICD-10-CM

## 2019-11-04 DIAGNOSIS — E78.5 HYPERLIPIDEMIA LDL GOAL <100: ICD-10-CM

## 2019-11-04 DIAGNOSIS — I10 ESSENTIAL HYPERTENSION: ICD-10-CM

## 2019-11-04 LAB
ALBUMIN SERPL-MCNC: 3.8 G/DL (ref 3.4–5)
ALP SERPL-CCNC: 76 U/L (ref 40–150)
ALT SERPL W P-5'-P-CCNC: 35 U/L (ref 0–70)
ANION GAP SERPL CALCULATED.3IONS-SCNC: 8 MMOL/L (ref 3–14)
AST SERPL W P-5'-P-CCNC: 23 U/L (ref 0–45)
BILIRUB SERPL-MCNC: 0.6 MG/DL (ref 0.2–1.3)
BUN SERPL-MCNC: 24 MG/DL (ref 7–30)
CALCIUM SERPL-MCNC: 9.1 MG/DL (ref 8.5–10.1)
CHLORIDE SERPL-SCNC: 104 MMOL/L (ref 94–109)
CHOLEST SERPL-MCNC: 234 MG/DL
CO2 SERPL-SCNC: 26 MMOL/L (ref 20–32)
CREAT SERPL-MCNC: 0.97 MG/DL (ref 0.66–1.25)
CREAT UR-MCNC: 114 MG/DL
GFR SERPL CREATININE-BSD FRML MDRD: 76 ML/MIN/{1.73_M2}
GLUCOSE SERPL-MCNC: 139 MG/DL (ref 70–99)
HBA1C MFR BLD: 7.1 % (ref 0–5.6)
HDLC SERPL-MCNC: 49 MG/DL
LDLC SERPL CALC-MCNC: 160 MG/DL
MICROALBUMIN UR-MCNC: 6 MG/L
MICROALBUMIN/CREAT UR: 5.7 MG/G CR (ref 0–17)
NONHDLC SERPL-MCNC: 185 MG/DL
POTASSIUM SERPL-SCNC: 4.6 MMOL/L (ref 3.4–5.3)
PROT SERPL-MCNC: 7.3 G/DL (ref 6.8–8.8)
SODIUM SERPL-SCNC: 138 MMOL/L (ref 133–144)
T4 FREE SERPL-MCNC: 0.95 NG/DL (ref 0.76–1.46)
TRIGL SERPL-MCNC: 125 MG/DL
TSH SERPL DL<=0.005 MIU/L-ACNC: 4.34 MU/L (ref 0.4–4)
VIT B12 SERPL-MCNC: 258 PG/ML (ref 193–986)

## 2019-11-04 PROCEDURE — 82306 VITAMIN D 25 HYDROXY: CPT | Performed by: FAMILY MEDICINE

## 2019-11-04 PROCEDURE — 36415 COLL VENOUS BLD VENIPUNCTURE: CPT | Performed by: FAMILY MEDICINE

## 2019-11-04 PROCEDURE — 80053 COMPREHEN METABOLIC PANEL: CPT | Performed by: FAMILY MEDICINE

## 2019-11-04 PROCEDURE — 80061 LIPID PANEL: CPT | Performed by: FAMILY MEDICINE

## 2019-11-04 PROCEDURE — 83036 HEMOGLOBIN GLYCOSYLATED A1C: CPT | Performed by: FAMILY MEDICINE

## 2019-11-04 PROCEDURE — 99606 MTMS BY PHARM EST 15 MIN: CPT | Performed by: PHARMACIST

## 2019-11-04 PROCEDURE — 82043 UR ALBUMIN QUANTITATIVE: CPT | Performed by: FAMILY MEDICINE

## 2019-11-04 PROCEDURE — 99607 MTMS BY PHARM ADDL 15 MIN: CPT | Performed by: PHARMACIST

## 2019-11-04 PROCEDURE — 82607 VITAMIN B-12: CPT | Performed by: FAMILY MEDICINE

## 2019-11-04 PROCEDURE — 84439 ASSAY OF FREE THYROXINE: CPT | Performed by: FAMILY MEDICINE

## 2019-11-04 PROCEDURE — 84443 ASSAY THYROID STIM HORMONE: CPT | Performed by: FAMILY MEDICINE

## 2019-11-04 RX ORDER — TAMSULOSIN HYDROCHLORIDE 0.4 MG/1
CAPSULE ORAL
Qty: 180 CAPSULE | Refills: 0 | Status: SHIPPED | OUTPATIENT
Start: 2019-11-04 | End: 2020-01-30

## 2019-11-04 NOTE — PROGRESS NOTES
"SUBJECTIVE/OBJECTIVE:                           Kushal Bush is a 74 year old male coming in for an follow-up (19) visit for Medication Therapy Management.  He was referred to me from Dr.weitz Fatima is a 2019 [facility] patient .     Chief Complaint:  Here for all yearly fasting labs recheck. Traveling a lot .   Wants to lose 10 more lbs to get to his \"goal\" weight .         Personal Healthcare Goals:  Fix a1c to <8% to have TURP and avoid insulin if he can!  Weight goal 165lbs. And off sfu drug if possible?    Allergies/ADRs: Reviewed in Epic  Tobacco: No tobacco use  Alcohol: not currently using  Caffeine: 2 cups/day of coffee-if not painting , 1 cup of tea prn.   Activity: working out 4 days /week --[employer] - walking , jogging, weights   PMH: Reviewed in Epic    Medication Adherence/Access:  no issues reported    Diabetes:  Pt currently taking glipizide 10mg. BID now  , pioglitazone 30mg ./day . Pt is not experiencing side effects.  SMB-2 x day .   Ranges (patient reported): see bg chart below. Has brand Flywheel metrix bs meter now .           Date FBG/ 2hours post Lunch/2hours post Dinner /2hours post    10-17-19   129 6pm    10-18-19  115 2pm     10-  95 4pm     10-20-19 128 12;30pm  96 5pm    10-   76 4pm    10-24 95      19 139 9am                Patient is not experiencing hypoglycemia  Recent symptoms of high blood sugar? none  Eye exam: up to date  Foot exam: due  ACEi/ARB: Yes: Lisinopril 2.5mg./day .    Urine Albumin:   Lab Results   Component Value Date    UMALCR 5.14 2018      Aspirin: Not taking due to age  Diet/Exercise: exercise -see activity above,  Diet wise he likes b-fast and very light lunch and regular dinner.     Lab Results   Component Value Date    A1C 7.1 2019    A1C 7.2 2019    A1C 7.8 2019    A1C 10.5 2018    A1C 8.3 10/10/2017         Immunizations:  Needs 2019 hi dose flu shot , had original shingles shot --doesn't feel like he " "needs it .       Hyperlipidemia: Current therapy includes : he stopped statin earlier this year .  Pt reports no significant myalgias or other side effects.  The 10-year ASCVD risk score (Becky ARMANDO JrTiana, et al., 2013) is: 38.5%    Values used to calculate the score:      Age: 74 years      Sex: Male      Is Non- : No      Diabetic: Yes      Tobacco smoker: No      Systolic Blood Pressure: 110 mmHg      Is BP treated: Yes      HDL Cholesterol: 36 mg/dL      Total Cholesterol: 140 mg/dL    Recent Labs   Lab Test  11/05/18   1455  10/10/17   1555   01/14/13   0850  01/05/12   0827   CHOL  140  132   --   158  143   HDL  36*  40   --   37*  36*   LDL  75  66   < >  89  84   TRIG  147  128   --   160*  113   CHOLHDLRATIO   --    --    --   4.3  4.0    < > = values in this interval not displayed.     Recheck fasting lab today .     Hypertension: Current medications include lisinopril 2.5mg qday(most days of week) , metoprolol 25mg -1/2 tab daily .  Patient does not self-monitor BP.  Patient reports no current medication side effects.    BP Readings from Last 3 Encounters:   11/04/19 110/66   07/22/19 110/66   04/01/19 90/70       Vitamin D3: level has not been drawn--but wife feels he would have more energy if he took it ?   We discussed sleep in 8 hour nightly range would give him more energy --that he has been trying and notices that is true and also he likes a small 1 hour cat nap everyafternoon.  Recheck D3 lab today .    Vitamin B12: level has not been drawn. Baseline lab today .           BP Readings from Last 1 Encounters:   11/04/19 110/66     Pulse Readings from Last 1 Encounters:   11/04/19 61     Wt Readings from Last 1 Encounters:   11/04/19 180 lb 3.2 oz (81.7 kg)     Ht Readings from Last 1 Encounters:   11/05/18 5' 7\" (1.702 m)     Estimated body mass index is 28.22 kg/m  as calculated from the following:    Height as of 11/5/18: 5' 7\" (1.702 m).    Weight as of this encounter: 180 lb " 3.2 oz (81.7 kg).    Temp Readings from Last 1 Encounters:   11/05/18 97.4  F (36.3  C) (Oral)             ASSESSMENT:                             Current medications were reviewed today.     Medication Adherence: excellent, no issues identified    Diabetes: Stable. Patient is meeting A1c goal of < 8%. Self monitoring of blood glucose is at goal of fasting  mg/dL and postprandial <160. Pt would benefit from minimum SMBG: Check blood sugars fasting, and occasionally 2 hours after starting a meal.  SFU (Glipizide) :  Reduce daily dose to 5mg bid to avoid hypo numbers while doing intermittent fasting--see plan.   Actos:  Stay on 30mg./day .     Weight loss recommended-suggested patient read the diabetes code --he did and is doing great on this!!  Increase exercise as tolerated at the [facility].      Immunizations:  Needs 2019 flu shot and shingrix --he will stop at our retial phcy. Today for cost of vaccines.       Hyperlipidemia: OFF statin this year --checking fasting lab today -result is pending.   He declines now at age 74 to take a statin --feels not necessary for primary prevention.   Pt is on moderate intensity statin which is indicated based on 2013 ACC/AHA guidelines for lipid management.          Hypertension: Stable. Patient is meeting BP goal of < 140/90mmHg.     Vitamin D3: level has not been drawn.  Baseline check today . Result is pending.    Vitamin B12: level has not been drawn . Baseline check today . Result is pending.        PLAN:        1. A1c today = 7.1% --excellent .   Please reduce your Glipizide dose from 10-5mg. Twice daily .     2. FYI--please call your insurance company to find out cost of a TURP surgery.     3. FYI--Please go to our pharmacy for 2019 flu shot and ask about cost of shingrix (shingles) vaccine. If affordable please get it .     Please watch [employer] for other lab results/plan.    It was great to speak with you today.  I value your experience and would be very thankful for  your time with providing feedback on our clinic survey. You may receive a survey via email or text message in the next few days.     Next MTM visit: see me next - 2- at 9;20am for A1c and weight recheck.         I spent 45 minutes with this patient today. All changes were made via collaborative practice agreement with Dr. Huggins. A copy of the visit note was provided to the patient's primary care provider.        The patient was given a summary of these recommendations as an after visit summary.         Kim Webster Rph.  Medication Therapy Management Provider  [phone]

## 2019-11-04 NOTE — TELEPHONE ENCOUNTER
Prescription approved per [facility], [facility] or [facility] refill protocol.  Jessica JOHNSON - Registered Nurse  [facility]  Acute and Diagnostic Services

## 2019-11-04 NOTE — PATIENT INSTRUCTIONS
Recommendations from today's MTM visit:                                                      1. A1c today = 7.1% --excellent .   Please reduce your Glipizide dose from 10-5mg. Twice daily .     2. FYI--please call your insurance company to find out cost of a TURP surgery.     3. FYI--Please go to our pharmacy for 2019 flu shot and ask about cost of shingrix (shingles) vaccine. If affordable please get it .     Please watch mychart for other lab results/plan.    It was great to speak with you today.  I value your experience and would be very thankful for your time with providing feedback on our clinic survey. You may receive a survey via email or text message in the next few days.     Next MTM visit: see me next - 2- at 9;20am for A1c and weight recheck.     To schedule another MTM appointment, please call the clinic directly or you may call the MTM scheduling line at [phone] or toll-free at [phone].     My Clinical Pharmacist's contact information:                                                      It was a pleasure talking with you today!  Please feel free to contact me with any questions or concerns you have.      Kim Webster Rph.  Medication Therapy Management Provider  [phone]

## 2019-11-04 NOTE — TELEPHONE ENCOUNTER
"Requested Prescriptions   Pending Prescriptions Disp Refills     tamsulosin (FLOMAX) 0.4 MG capsule [Pharmacy Med Name: TAMSULOSIN 0.4MG CAPSULES]  Last Written Prescription Date:  5/1/2019  Last Fill Quantity: 180 capsule,  # refills: 1   Last office visit: 11/5/2018 with prescribing provider:  Joseluis   Future Office Visit:   Next 5 appointments (look out 90 days)    Nov 14, 2019  1:25 PM CST  Office Visit with Florentin Tomas PA-C, CR EXAM ROOM 13  [facility]) [address] [place] [id number] [phone]          180 capsule 0     Sig: TAKE 2 CAPSULES(0.8 MG) BY MOUTH DAILY       Alpha Blockers Passed - 11/4/2019  5:28 AM        Passed - Blood pressure under 140/90 in past 12 months     BP Readings from Last 3 Encounters:   11/04/19 110/66   07/22/19 110/66   04/01/19 90/70                 Passed - Recent (12 mo) or future (30 days) visit within the authorizing provider's specialty     Patient has had an office visit with the authorizing provider or a provider within the authorizing providers department within the previous 12 mos or has a future within next 30 days. See \"Patient Info\" tab in inbasket, or \"Choose Columns\" in Meds & Orders section of the refill encounter.              Passed - Patient does not have Tadalafil, Vardenafil, or Sildenafil on their medication list        Passed - Medication is active on med list        Passed - Patient is 18 years of age or older          "

## 2019-11-04 NOTE — Clinical Note
tavo--junaid doing great --we are reducing his glipizide dose today to avoid hypo numbers and increase weight loss --rechekc a1c feb -2020.Keegan

## 2019-11-05 LAB — DEPRECATED CALCIDIOL+CALCIFEROL SERPL-MC: 20 UG/L (ref 20–75)

## 2019-11-07 RX ORDER — PIOGLITAZONEHYDROCHLORIDE 30 MG/1
TABLET ORAL
Qty: 90 TABLET | Refills: 0 | Status: SHIPPED | OUTPATIENT
Start: 2019-11-07 | End: 2020-02-10

## 2019-11-11 ENCOUNTER — TELEPHONE (OUTPATIENT)
Dept: FAMILY MEDICINE | Facility: CLINIC | Age: 74
End: 2019-11-11

## 2019-11-11 NOTE — TELEPHONE ENCOUNTER
Patient received letter stating office visit was needed. Patient scheduled with Hunter Tomas because he was available sooner than Dr. Huggins so was scheduled for Thursday 11/14/19. Hunter had a schedule change and will now not be in clinic on 11/14/19, had to call clinic to r/s appointment. Patient scheduled next available appointment with PCP on Tuesday 12/31/19 will need refills to get him to visit. Ruth Behrens.

## 2019-11-11 NOTE — TELEPHONE ENCOUNTER
Called patient back and advised to call his pharmacy when refill needed.  Patient states this is taken care of and not sure why he is getting so many calls.  Dorothy Kraft RN

## 2019-12-31 ENCOUNTER — OFFICE VISIT (OUTPATIENT)
Dept: FAMILY MEDICINE | Facility: CLINIC | Age: 74
End: 2019-12-31
Payer: MEDICARE

## 2019-12-31 VITALS
SYSTOLIC BLOOD PRESSURE: 121 MMHG | DIASTOLIC BLOOD PRESSURE: 74 MMHG | WEIGHT: 176 LBS | HEIGHT: 68 IN | OXYGEN SATURATION: 97 % | BODY MASS INDEX: 26.67 KG/M2 | TEMPERATURE: 97.7 F | HEART RATE: 55 BPM | RESPIRATION RATE: 16 BRPM

## 2019-12-31 DIAGNOSIS — R07.89 ATYPICAL CHEST PAIN: ICD-10-CM

## 2019-12-31 DIAGNOSIS — E11.9 CONTROLLED TYPE 2 DIABETES MELLITUS WITHOUT COMPLICATION, WITHOUT LONG-TERM CURRENT USE OF INSULIN (H): ICD-10-CM

## 2019-12-31 DIAGNOSIS — I10 ESSENTIAL HYPERTENSION WITH GOAL BLOOD PRESSURE LESS THAN 140/90: ICD-10-CM

## 2019-12-31 DIAGNOSIS — N40.0 BENIGN NON-NODULAR PROSTATIC HYPERPLASIA WITHOUT LOWER URINARY TRACT SYMPTOMS: ICD-10-CM

## 2019-12-31 PROCEDURE — 99207 C FOOT EXAM  NO CHARGE: CPT | Mod: 25 | Performed by: FAMILY MEDICINE

## 2019-12-31 PROCEDURE — 99214 OFFICE O/P EST MOD 30 MIN: CPT | Performed by: FAMILY MEDICINE

## 2019-12-31 RX ORDER — TAMSULOSIN HYDROCHLORIDE 0.4 MG/1
CAPSULE ORAL
Qty: 180 CAPSULE | Refills: 1 | Status: CANCELLED | OUTPATIENT
Start: 2019-12-31

## 2019-12-31 RX ORDER — PIOGLITAZONEHYDROCHLORIDE 30 MG/1
30 TABLET ORAL DAILY
Qty: 90 TABLET | Refills: 3 | Status: SHIPPED | OUTPATIENT
Start: 2019-12-31 | End: 2020-11-23

## 2019-12-31 RX ORDER — DOXAZOSIN 4 MG/1
4 TABLET ORAL AT BEDTIME
Qty: 90 TABLET | Refills: 1 | Status: SHIPPED | OUTPATIENT
Start: 2019-12-31 | End: 2020-11-23

## 2019-12-31 RX ORDER — GLIPIZIDE 5 MG/1
TABLET ORAL
Qty: 270 TABLET | Refills: 1 | Status: SHIPPED | OUTPATIENT
Start: 2019-12-31 | End: 2020-11-23

## 2019-12-31 RX ORDER — METOPROLOL TARTRATE 25 MG/1
TABLET, FILM COATED ORAL
Qty: 45 TABLET | Refills: 1 | Status: SHIPPED | OUTPATIENT
Start: 2019-12-31 | End: 2020-07-31

## 2019-12-31 ASSESSMENT — MIFFLIN-ST. JEOR: SCORE: 1507.83

## 2019-12-31 NOTE — PROGRESS NOTES
Subjective     Kushal Bush is a 74 year old male who presents to clinic today for the following health issues:      Diabetes:   He presents for follow up of diabetes.  He is checking home blood glucose one time daily. He checks blood glucose before meals.  Blood glucose is never over 200 and never under 70. He is aware of hypoglycemia symptoms including shakiness. He has no concerns regarding his diabetes at this time.  He is not experiencing numbness or burning in feet, excessive thirst, blurry vision, weight changes or redness, sores or blisters on feet. The patient has had a diabetic eye exam in the last 12 months. Eye exam performed on April 2019.    Diabetes Management Resources  History of Present Illness        Diabetes:   He presents for follow up of diabetes.  He is checking home blood glucose one time daily. He checks blood glucose before meals.  Blood glucose is never over 200 and never under 70. He is aware of hypoglycemia symptoms including shakiness. He has no concerns regarding his diabetes at this time.  He is not experiencing numbness or burning in feet, excessive thirst, blurry vision, weight changes or redness, sores or blisters on feet. The patient has had a diabetic eye exam in the last 12 months. Eye exam performed on April 2019.    Diabetes Management Resources     Wants to talk about having Turp surgery.      controlled type 2 diabetes mellitus without complication, without long-term current use of insulin (H)- No cramps. No swelling. Checking blood glucose in the afternoon most days.  Metformin caused nausea in the past. Eye exam one month ago at [facility] in [place], [place].  Lab Results   Component Value Date    A1C 7.1 11/04/2019    A1C 7.2 07/22/2019    A1C 7.8 04/01/2019    A1C 10.5 11/05/2018    A1C 8.3 10/10/2017       Essential hypertension with goal blood pressure less than 140/90-   BP Readings from Last 3 Encounters:   12/31/19 121/74   11/04/19 110/66   07/22/19 110/66        Benign non-nodular prostatic hyperplasia without lower urinary tract symptoms- Patient wonders about TURP procedure. Nocturia x2 per night.    Atypical chest pain-absent with current therapies.  He wonders about stopping his beta-blocker          Past Medical History:   Diagnosis Date     Asthma      Benign non-nodular prostatic hyperplasia without lower urinary tract symptoms 7/18/2016     BPH (benign prostatic hyperplasia) 9/14/2011     Campylobacter diarrhea 7/18/2016     CMC arthritis, thumb, degenerative 11/25/2013     Diabetes type 2, uncontrolled (H) 7/18/2016     Essential hypertension with goal blood pressure less than 140/90 7/18/2016     Fatigue, unspecified type 7/18/2016     Heart palpitations 9/14/2011     HTN (hypertension) 9/14/2011     HTN, goal below 140/90 1/4/2012     Hyperglycemia 10/27/2011     Hyperlipidemia LDL goal <100 10/27/2011     Impacted cerumen 9/14/2011     Intermittent asthma 9/14/2011     Lumbar radiculopathy 11/7/2011     Mumps      Palpitations      Post-nasal drainage 9/14/2011     Presbycusis 9/14/2011     Seborrheic keratoses 9/14/2011     Tubular adenoma of colon      Type 2 diabetes mellitus without complications (H) 10/12/2015     Type 2 diabetes, HbA1c goal < 7% (H) 1/6/2012     Type II diabetes mellitus with HbA1C goal between 7 and 8 6/18/2013     Uncontrolled type 2 diabetes mellitus without complication, without long-term current use of insulin (H) 12/27/2016       Past Surgical History:   Procedure Laterality Date     COLONOSCOPY  1/29/14     COLONOSCOPY  1/29/2014    Procedure: COMBINED COLONOSCOPY, SINGLE BIOPSY/POLYPECTOMY BY BIOPSY;  COLONOSCOPY two polyps with jumbo forceps;  Surgeon: Alina Hopkins MD;  Location:  GI     EYE SURGERY Right 12/14/16    cataract removal      ingrown toenail         Family History   Problem Relation Age of Onset     Cancer Mother         age 89, uterine/breast     Heart Disease Father         51       Social History  "    Tobacco Use     Smoking status: Never Smoker     Smokeless tobacco: Never Used   Substance Use Topics     Alcohol use: Yes     Comment: occas.         Reviewed and updated as needed this visit by Provider       Review of Systems   CONSTITUTIONAL: NEGATIVE for fever, chills  RESP: NEGATIVE for significant cough or SOB  CV: NEGATIVE for chest pain, palpitations or peripheral edema  GI: NEGATIVE for nausea, abdominal pain, heartburn, or change in bowel habits      This document serves as a record of the services and decisions personally performed and made by Albert Huggins MD. It was created on his behalf by Sarbjit Holley, a trained medical scribe. The creation of this document is based on the provider's statements to the medical scribe.  Sarbjit Holley December 31, 2019 10:06 AM        Objective    /74 (BP Location: Right arm, Patient Position: Sitting, Cuff Size: Adult Large)   Pulse 55   Temp 97.7  F (36.5  C) (Oral)   Resp 16   Ht 1.727 m (5' 8\")   Wt 79.8 kg (176 lb)   SpO2 97%   BMI 26.76 kg/m    Body mass index is 26.76 kg/m .     Physical Exam   Diabetic foot exam: thready DP pulses and no trophic changes or ulcerative lesions        Diagnostic Test Results:  Labs reviewed in Epic  No results found for this or any previous visit (from the past 24 hour(s)).        Assessment & Plan   Problem List Items Addressed This Visit        Nervous and Auditory    Atypical chest pain     Absent with beta-blockade.  Previous evaluation, now remote, demonstrated PVCs         Relevant Medications    metoprolol tartrate (LOPRESSOR) 25 MG tablet       Endocrine    Controlled type 2 diabetes mellitus without complication, without long-term current use of insulin (H)     Lab Results   Component Value Date    A1C 7.1 11/04/2019    A1C 7.2 07/22/2019    A1C 7.8 04/01/2019    A1C 10.5 11/05/2018    A1C 8.3 10/10/2017   He did not tolerate metformin but diabetes is currently controlled.  Discussed natural history.  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         Relevant Medications    doxazosin (CARDURA) 4 MG tablet    metoprolol tartrate (LOPRESSOR) 25 MG tablet       Urinary    Benign non-nodular prostatic hyperplasia without lower urinary tract symptoms     Previously referred for laser ablation, he  demurred. Discussed laser TURP, re refer.         Relevant Medications    doxazosin (CARDURA) 4 MG tablet    Other Relevant Orders    UROLOGY ADULT REFERRAL         Discussed Shingles vaccine.    Return in about 4 months (around 4/30/2020).    The information in this document, created by the medical scribe for me, accurately reflects the services I personally performed and the decisions made by me. I have reviewed and approved this document for accuracy prior to leaving the patient care area.  December 31, 2019 10:06 AM    Albert Huggins MD  [facility]

## 2020-01-01 PROBLEM — R07.89 ATYPICAL CHEST PAIN: Status: ACTIVE | Noted: 2020-01-01

## 2020-01-01 NOTE — ASSESSMENT & PLAN NOTE
Lab Results   Component Value Date    A1C 7.1 11/04/2019    A1C 7.2 07/22/2019    A1C 7.8 04/01/2019    A1C 10.5 11/05/2018    A1C 8.3 10/10/2017   He did not tolerate metformin but diabetes is currently controlled.  Discussed natural history.  Positive strokes for his medical compliance

## 2020-01-30 DIAGNOSIS — N40.0 BENIGN NON-NODULAR PROSTATIC HYPERPLASIA WITHOUT LOWER URINARY TRACT SYMPTOMS: ICD-10-CM

## 2020-01-30 RX ORDER — TAMSULOSIN HYDROCHLORIDE 0.4 MG/1
CAPSULE ORAL
Qty: 180 CAPSULE | Refills: 2 | Status: SHIPPED | OUTPATIENT
Start: 2020-01-30 | End: 2020-10-29

## 2020-01-30 NOTE — TELEPHONE ENCOUNTER
"Requested Prescriptions   Pending Prescriptions Disp Refills     tamsulosin (FLOMAX) 0.4 MG capsule [Pharmacy Med Name: TAMSULOSIN 0.4MG CAPSULES] 180 capsule 0     Sig: TAKE 2 CAPSULES(0.8 MG) BY MOUTH DAILY       Alpha Blockers Passed - 1/30/2020  9:19 AM        Passed - Blood pressure under 140/90 in past 12 months     BP Readings from Last 3 Encounters:   12/31/19 121/74   11/04/19 110/66   07/22/19 110/66                 Passed - Recent (12 mo) or future (30 days) visit within the authorizing provider's specialty     Patient has had an office visit with the authorizing provider or a provider within the authorizing providers department within the previous 12 mos or has a future within next 30 days. See \"Patient Info\" tab in inbasket, or \"Choose Columns\" in Meds & Orders section of the refill encounter.              Passed - Patient does not have Tadalafil, Vardenafil, or Sildenafil on their medication list        Passed - Medication is active on med list        Passed - Patient is 18 years of age or older          "

## 2020-02-07 NOTE — PROGRESS NOTES
"SUBJECTIVE/OBJECTIVE:                           Kushal Bush is a 74 year old male coming in for an follow-up (19) visit for Medication Therapy Management.  He was referred to me from     Kushal is a 2020 [facility] patient .     Chief Complaint:  Here for A1c lab today . Traveling a lot .   Wants to lose 10 more lbs to get to his \"goal\" weight .     Has appt. This week with urology for TURP .  pcp wanted him to get the shingles vaccine --he isnt sure about this?      Personal Healthcare Goals:  Fix a1c to <8% to have TURP and avoid insulin if he can!  Weight goal 165lbs. And off sfu drug if possible?    Allergies/ADRs: Reviewed in Epic  Tobacco: No tobacco use  Alcohol: not currently using  Caffeine: 2 cups/day of coffee-if not painting , 1 cup of tea prn.   Activity: working out 6-7 days /week --[facility] - walking , jogging, weights   PMH: Reviewed in Epic    Medication Adherence/Access:  no issues reported    Diabetes:  Pt currently taking glipizide 5mg. BID now  , pioglitazone 30mg ./day . Pt is not experiencing side effects.  SMB-2 x day .   Ranges (patient reported): see bg chart below. Has brand new [employer] true metrix bs meter now .          Date FBG/ 2hours post Lunch/2hours post Dinner /2hours post    2-9 131      2-8 108      2-6 139      2-4 153      2-2 115      2-1 99      1-31- 99                   Date FBG/ 2hours post Lunch/2hours post Dinner /2hours post    10-17-19   129 6pm    10-18-19  115 2pm     10-19  95 4pm     10-19 128 12;30pm  96 5pm    10-23   76 4pm    10-24 95      -19 139 9am                Patient is not experiencing hypoglycemia  Recent symptoms of high blood sugar? none  Eye exam: up to date  Foot exam: due  ACEi/ARB: no .  Urine Albumin:   Lab Results   Component Value Date    UMALCR 5.70 2019      Aspirin: Not taking due to age  Diet/Exercise: 6-7 days week working out at [employer], watching diet --doing IF --working well. Goal is 10 more lbs. Weight loss in " 2020.   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     Lab Results   Component Value Date    A1C 6.9 02/10/2020    A1C 7.1 11/04/2019    A1C 7.2 07/22/2019    A1C 7.8 04/01/2019    A1C 10.5 11/05/2018         Immunizations:  Had flu shot , will ask [facility] about cost of shingles shot.     Hyperlipidemia: Current therapy includes : he stopped statin earlier this year(pcp ok with this )-he just started 600mg. Pill daily of red yeast rice .   Pt reports no significant myalgias or other side effects.  The 10-year ASCVD risk score (Naplescaprice ARMANDO Jr., et al., 2013) is: 46.2%    Values used to calculate the score:      Age: 74 years      Sex: Male      Is Non- : No      Diabetic: Yes      Tobacco smoker: No      Systolic Blood Pressure: 121 mmHg      Is BP treated: Yes      HDL Cholesterol: 49 mg/dL      Total Cholesterol: 234 mg/dL    Recent Labs   Lab Test 11/04/19  0951 11/05/18  1455  01/14/13  0850 01/05/12  0827   CHOL 234* 140   < > 158 143   HDL 49 36*   < > 37* 36*   * 75   < > 89 84   TRIG 125 147   < > 160* 113   CHOLHDLRATIO  --   --   --  4.3 4.0    < > = values in this interval not displayed.         Hypertension: Current medications include lisinopril 2.5mg qday(most days of week) , metoprolol 25mg -1/2 tab daily .  Patient does not self-monitor BP.  Patient reports no current medication side effects.    BP Readings from Last 3 Encounters:   02/10/20 98/60   12/31/19 121/74   11/04/19 110/66       Vitamin D3: level is 20 . Low . He isnt currently taking any daily otc med.     Vitamin B12: level is 258.  He isnt currently taking any daily otc med . But is interested if it will help energy /memory.         BP Readings from Last 1 Encounters:   02/10/20 98/60     Pulse Readings from Last 1 Encounters:   02/10/20 58     Wt Readings from Last 1 Encounters:   02/10/20 179 lb 4.8 oz (81.3 kg)     Ht Readings from Last 1 Encounters:   12/31/19 5'  "8\" (1.727 m)     Estimated body mass index is 27.26 kg/m  as calculated from the following:    Height as of 12/31/19: 5' 8\" (1.727 m).    Weight as of this encounter: 179 lb 4.8 oz (81.3 kg).    Temp Readings from Last 1 Encounters:   12/31/19 97.7  F (36.5  C) (Oral)             ASSESSMENT:                             Current medications were reviewed today.     Medication Adherence: excellent, no issues identified    Diabetes: Stable. Patient is meeting A1c goal of < 8%. Self monitoring of blood glucose is at goal of fasting  mg/dL and postprandial <160. Pt would benefit from minimum SMBG: Check blood sugars fasting, and occasionally 2 hours after starting a meal.  SFU (Glipizide) :  Stay on same daily dose .   Actos:  Stay on 30mg./day .     Weight loss recommended-suggested patient read the diabetes code --he did and is doing great on this!!continue as is with IF diet plan.  Increase exercise as tolerated at the [facility].      Immunizations:  Is interested in the shingles vaccine -- will check with [employer] for cost .     Hyperlipidemia: Stable. Pt is not on any  intensity statin(ok with pcp and mtm ) which is indicated based on 2019 ACC/AHA guidelines for lipid management.    He did however just start RYR daily pill this month .      Patient refuses medication at this time and wants to work on lifestyle changes. Pt will be due for fasting labs in 6 months.        Hypertension: Stable. Patient is meeting BP goal of < 140/90mmHg.     Vitamin D3: is not at goal of 50-75ng/mL. Pt would benefit from initiation of OTC vitamin D3 5000IU daily.      Vitamin B12: is not at goal of 600-1000pg/mL. Pt would benefit from initiation of OTC vitamin B12 at 500mcg daily.          PLAN:      1. A1c today = 6.9% --excellent --keep up great work !      2. FYI--your vitamin D level is 20 - low --please start a daily vitamin-D 5000 unit pill    3. FYI--Your Vitamin B-12 (energy/memory vitamin) level is low at 258 -please start a " daily 500mcg. Pill.     4. FYI--check with [employer] on cost of shingles vaccine.       It was great to speak with you today.  I value your experience and would be very thankful for your time with providing feedback on our clinic survey. You may receive a survey via email or text message in the next few days.     Next [facility] visit:   5- at 9;55am . Review meds/labs.       I spent 30 minutes with this patient today. All changes were made via collaborative practice agreement with Dr. Huggins. A copy of the visit note was provided to the patient's primary care provider.        The patient was given a summary of these recommendations as an after visit summary.         Kim Webster Rph.  Medication Therapy Management Provider  [phone]

## 2020-02-10 ENCOUNTER — OFFICE VISIT (OUTPATIENT)
Dept: PHARMACY | Facility: CLINIC | Age: 75
End: 2020-02-10
Payer: COMMERCIAL

## 2020-02-10 VITALS
BODY MASS INDEX: 27.26 KG/M2 | SYSTOLIC BLOOD PRESSURE: 98 MMHG | HEART RATE: 58 BPM | DIASTOLIC BLOOD PRESSURE: 60 MMHG | WEIGHT: 179.3 LBS | OXYGEN SATURATION: 97 %

## 2020-02-10 DIAGNOSIS — E53.8 VITAMIN B12 DEFICIENCY (NON ANEMIC): ICD-10-CM

## 2020-02-10 DIAGNOSIS — Z23 ENCOUNTER FOR IMMUNIZATION: ICD-10-CM

## 2020-02-10 DIAGNOSIS — I10 ESSENTIAL HYPERTENSION: ICD-10-CM

## 2020-02-10 DIAGNOSIS — E78.5 HYPERLIPIDEMIA LDL GOAL <100: ICD-10-CM

## 2020-02-10 DIAGNOSIS — E55.9 VITAMIN D DEFICIENCY: ICD-10-CM

## 2020-02-10 LAB — HBA1C MFR BLD: 6.9 % (ref 0–5.6)

## 2020-02-10 PROCEDURE — 99607 MTMS BY PHARM ADDL 15 MIN: CPT | Performed by: PHARMACIST

## 2020-02-10 PROCEDURE — 36415 COLL VENOUS BLD VENIPUNCTURE: CPT | Performed by: FAMILY MEDICINE

## 2020-02-10 PROCEDURE — 99605 MTMS BY PHARM NP 15 MIN: CPT | Performed by: PHARMACIST

## 2020-02-10 PROCEDURE — 83036 HEMOGLOBIN GLYCOSYLATED A1C: CPT | Performed by: FAMILY MEDICINE

## 2020-02-10 RX ORDER — AMPICILLIN TRIHYDRATE 250 MG
600 CAPSULE ORAL DAILY
COMMUNITY
End: 2023-04-24

## 2020-02-10 NOTE — Clinical Note
fyi--a1c great , doing well on the IF diet plan.Will start some otc vits and have phcy tell him cost of shingrix --if affordable he will have it .marcelai--he did start a daily red yeast rice pill for cholesterol help.Keegan

## 2020-02-10 NOTE — PATIENT INSTRUCTIONS
Recommendations from today's MTM visit:                                                      1. A1c today = 6.9% --excellent --keep up great work !      2. FYI--your vitamin D level is 20 - low --please start a daily vitamin-D 5000 unit pill    3. FYI--Your Vitamin B-12 (energy/memory vitamin) level is low at 258 -please start a daily 500mcg. Pill.     4. FYI--check with [facility] on cost of shingles vaccine.       It was great to speak with you today.  I value your experience and would be very thankful for your time with providing feedback on our clinic survey. You may receive a survey via email or text message in the next few days.     Next MTM visit:   5- at 9;55am . Review meds/labs.     To schedule another MTM appointment, please call the clinic directly or you may call the MTM scheduling line at [phone] or toll-free at [phone].     My Clinical Pharmacist's contact information:                                                      It was a pleasure talking with you today!  Please feel free to contact me with any questions or concerns you have.      Kim Webster Rph.  Medication Therapy Management Provider  [phone]

## 2020-02-12 ENCOUNTER — OFFICE VISIT (OUTPATIENT)
Dept: UROLOGY | Facility: CLINIC | Age: 75
End: 2020-02-12
Attending: FAMILY MEDICINE
Payer: MEDICARE

## 2020-02-12 VITALS
HEIGHT: 68 IN | WEIGHT: 175 LBS | BODY MASS INDEX: 26.52 KG/M2 | DIASTOLIC BLOOD PRESSURE: 74 MMHG | SYSTOLIC BLOOD PRESSURE: 140 MMHG | HEART RATE: 62 BPM | OXYGEN SATURATION: 95 %

## 2020-02-12 DIAGNOSIS — N40.0 ENLARGED PROSTATE: ICD-10-CM

## 2020-02-12 DIAGNOSIS — N40.0 ENLARGED PROSTATE: Primary | ICD-10-CM

## 2020-02-12 LAB
ALBUMIN UR-MCNC: NEGATIVE MG/DL
APPEARANCE UR: CLEAR
BILIRUB UR QL STRIP: NEGATIVE
COLOR UR AUTO: YELLOW
GLUCOSE UR STRIP-MCNC: NEGATIVE MG/DL
HGB UR QL STRIP: NEGATIVE
KETONES UR STRIP-MCNC: NEGATIVE MG/DL
LEUKOCYTE ESTERASE UR QL STRIP: NEGATIVE
NITRATE UR QL: NEGATIVE
PH UR STRIP: 6 PH (ref 5–7)
RESIDUAL VOLUME (RV) (EXTERNAL): 88
SOURCE: NORMAL
SP GR UR STRIP: >1.03 (ref 1–1.03)
UROBILINOGEN UR STRIP-ACNC: 1 EU/DL (ref 0.2–1)

## 2020-02-12 PROCEDURE — 84153 ASSAY OF PSA TOTAL: CPT | Performed by: UROLOGY

## 2020-02-12 PROCEDURE — 81003 URINALYSIS AUTO W/O SCOPE: CPT | Mod: QW | Performed by: UROLOGY

## 2020-02-12 PROCEDURE — 51798 US URINE CAPACITY MEASURE: CPT | Performed by: UROLOGY

## 2020-02-12 PROCEDURE — 99214 OFFICE O/P EST MOD 30 MIN: CPT | Mod: 25 | Performed by: UROLOGY

## 2020-02-12 PROCEDURE — 36415 COLL VENOUS BLD VENIPUNCTURE: CPT | Performed by: UROLOGY

## 2020-02-12 RX ORDER — FINASTERIDE 5 MG/1
5 TABLET, FILM COATED ORAL DAILY
Qty: 90 TABLET | Refills: 3 | Status: SHIPPED | OUTPATIENT
Start: 2020-02-12 | End: 2020-11-23

## 2020-02-12 ASSESSMENT — MIFFLIN-ST. JEOR: SCORE: 1503.29

## 2020-02-12 ASSESSMENT — PAIN SCALES - GENERAL: PAINLEVEL: NO PAIN (0)

## 2020-02-12 NOTE — LETTER
2/12/2020       RE: Ksuhal Bush  [address]  [facility] [address]     Dear Colleague,    Thank you for referring your patient, Kushal Bush, to the [facility] [place] [facility]. Please see a copy of my visit note below.    Office Visit Note  [facility]  [phone]    UROLOGIC DIAGNOSES:   Enlarged prostate and difficulty urinating, nocturia    CURRENT INTERVENTIONS:   Flomax    HISTORY:   Ed returns to clinic today because he wishes to discuss his treatment options for enlarged prostate again.  He continues to complain of a slow urinary stream.  He had photo vaporization of the prostate scheduled in 2017 but he canceled the procedure because his hemoglobin A1c was high.  He has questions about medications for his enlarged prostate as well.  He continues on the Flomax.        PAST MEDICAL HISTORY:   Past Medical History:   Diagnosis Date     Asthma      Benign non-nodular prostatic hyperplasia without lower urinary tract symptoms 7/18/2016     BPH (benign prostatic hyperplasia) 9/14/2011     Campylobacter diarrhea 7/18/2016     CMC arthritis, thumb, degenerative 11/25/2013     Diabetes type 2, uncontrolled (H) 7/18/2016     Essential hypertension with goal blood pressure less than 140/90 7/18/2016     Fatigue, unspecified type 7/18/2016     Heart palpitations 9/14/2011     HTN (hypertension) 9/14/2011     HTN, goal below 140/90 1/4/2012     Hyperglycemia 10/27/2011     Hyperlipidemia LDL goal <100 10/27/2011     Impacted cerumen 9/14/2011     Intermittent asthma 9/14/2011     Lumbar radiculopathy 11/7/2011     Mumps      Palpitations      Post-nasal drainage 9/14/2011     Presbycusis 9/14/2011     Seborrheic keratoses 9/14/2011     Tubular adenoma of colon      Type 2 diabetes mellitus without complications (H) 10/12/2015     Type 2 diabetes, HbA1c goal < 7% (H) 1/6/2012     Type II diabetes mellitus  with HbA1C goal between 7 and 8 6/18/2013     Uncontrolled type 2 diabetes mellitus without complication, without long-term current use of insulin (H) 12/27/2016       PAST SURGICAL HISTORY:   Past Surgical History:   Procedure Laterality Date     COLONOSCOPY  1/29/14     COLONOSCOPY  1/29/2014    Procedure: COMBINED COLONOSCOPY, SINGLE BIOPSY/POLYPECTOMY BY BIOPSY;  COLONOSCOPY two polyps with jumbo forceps;  Surgeon: Alina Hopkins MD;  Location: [facility]     EYE SURGERY Right 12/14/16    cataract removal      ingrown toenail         FAMILY HISTORY:   Family History   Problem Relation Age of Onset     Cancer Mother         age 89, uterine/breast     Heart Disease Father         51       SOCIAL HISTORY:   Social History     Tobacco Use     Smoking status: Never Smoker     Smokeless tobacco: Never Used   Substance Use Topics     Alcohol use: Yes     Comment: occas.         Current Outpatient Medications   Medication     alcohol swab prep pads     ASPIRIN NOT PRESCRIBED (INTENTIONAL)     blood glucose (NO BRAND SPECIFIED) test strip     blood glucose calibration (NO BRAND SPECIFIED) solution     doxazosin (CARDURA) 4 MG tablet     glipiZIDE (GLUCOTROL) 5 MG tablet     metoprolol tartrate (LOPRESSOR) 25 MG tablet     pioglitazone (ACTOS) 30 MG tablet     red yeast rice 600 MG CAPS     STATIN NOT PRESCRIBED (INTENTIONAL)     tamsulosin (FLOMAX) 0.4 MG capsule     thin (NO BRAND SPECIFIED) lancets     No current facility-administered medications for this visit.          PHYSICAL EXAM:    There were no vitals taken for this visit.    Constitutional: Well developed. Conversant and in no acute distress  Eyes: Anicteric sclera, conjunctiva clear, normal extraocular movements  ENT: Normocephalic and atraumatic,   Skin: Warm and dry. No rashes or lesions  Cardiac: No peripheral edema  Back/Flank: Not done  CNS/PNS: Normal musculature and movements, moves all extremities normally  Respiratory: Normal non-labored  breathing  Abdomen: Soft nontender and nondistended  Peripheral Vascular: No peripheral edema  Mental Status/Psych: Alert and Oriented x 3. Normal mood and affect    Penis: Not done  Scrotal Skin: Not done  Testicles: Not done  Epididymis: Not done  Digital Rectal Exam: Deferred because he needs a PSA checked today    Cystoscopy: Not done    Imaging: None    Urinalysis: UA RESULTS:  Recent Labs   Lab Test 04/09/18  1110  04/30/12  1222   COLOR Yellow   < > Yellow   APPEARANCE Clear   < > Clear   URINEGLC 500*   < > 500*   URINEBILI Negative   < > Negative   URINEKETONE Trace*   < > Trace*   SG 1.020   < > 1.025   UBLD Negative   < > Trace*   URINEPH 5.5   < > 5.5   PROTEIN Negative   < > Negative   UROBILINOGEN 0.2   < > 0.2   NITRITE Negative   < > Negative   LEUKEST Negative   < > Negative   RBCU  --   --  O - 2   WBCU  --   --  O - 2    < > = values in this interval not displayed.       PSA: last PSA was in 2017    Post Void Residual: 88mL    Other labs: None today      IMPRESSION:  Enlarged prostate, slow urinary stream    PLAN:  We discussed his options again for his enlarged prostate.  He still thinks he would like to proceed with a photo vaporization of the prostate, but likely in the fall.  He is leaving for [place] again this summer.  In the meantime he would like to try finasteride.  I discussed the medication with him and gave him a prescription.  He will take Flomax and finasteride and I will see him back in the fall.  He will have a PSA checked on his way out today as well.      Dejuan Felix M.D.

## 2020-02-12 NOTE — NURSING NOTE
Chief Complaint   Patient presents with     Benign Prostatic Hyperplasia     Post Void Residual: 88 mL    Rizwana Devlin, EMT

## 2020-02-13 LAB — PSA SERPL-MCNC: 1.55 UG/L (ref 0–4)

## 2020-03-01 ENCOUNTER — HEALTH MAINTENANCE LETTER (OUTPATIENT)
Age: 75
End: 2020-03-01

## 2020-06-16 ENCOUNTER — TELEPHONE (OUTPATIENT)
Dept: FAMILY MEDICINE | Facility: CLINIC | Age: 75
End: 2020-06-16

## 2020-06-16 NOTE — TELEPHONE ENCOUNTER
Patient Quality Outreach Summary      Summary:    Patient is due/failing the following:   Eye Exam    Type of outreach:    up to date routed to abstract    Questions for provider review:    None                                                                                                                    CAMMIE Mendez      Chart routed to Care Team.

## 2020-07-31 DIAGNOSIS — R07.89 ATYPICAL CHEST PAIN: ICD-10-CM

## 2020-07-31 DIAGNOSIS — I10 ESSENTIAL HYPERTENSION WITH GOAL BLOOD PRESSURE LESS THAN 140/90: ICD-10-CM

## 2020-07-31 RX ORDER — METOPROLOL TARTRATE 25 MG/1
TABLET, FILM COATED ORAL
Qty: 45 TABLET | Refills: 0 | Status: SHIPPED | OUTPATIENT
Start: 2020-07-31 | End: 2020-10-29

## 2020-07-31 NOTE — TELEPHONE ENCOUNTER
juanis message sent to patient to call and schedule virtual appointment for follow up     Nehal Bullard/

## 2020-07-31 NOTE — TELEPHONE ENCOUNTER
Routing refill request to provider for review/approval because:  Labs out of range:  bp  BP Readings from Last 3 Encounters:   02/12/20 (!) 140/74   02/10/20 98/60   12/31/19 121/74     Luis Yost RN, BSN

## 2020-08-27 NOTE — TELEPHONE ENCOUNTER
One refill sent.  Does need to see primary at least yearly.  Letter sent.  Dorothy Kraft RN     Principal Discharge DX:	Shortness of breath  Secondary Diagnosis:	Deep vein thrombosis (DVT) of other vein of both lower extremities

## 2020-10-28 DIAGNOSIS — R07.89 ATYPICAL CHEST PAIN: ICD-10-CM

## 2020-10-28 DIAGNOSIS — N40.0 BENIGN NON-NODULAR PROSTATIC HYPERPLASIA WITHOUT LOWER URINARY TRACT SYMPTOMS: ICD-10-CM

## 2020-10-28 DIAGNOSIS — I10 ESSENTIAL HYPERTENSION WITH GOAL BLOOD PRESSURE LESS THAN 140/90: ICD-10-CM

## 2020-10-29 RX ORDER — TAMSULOSIN HYDROCHLORIDE 0.4 MG/1
0.8 CAPSULE ORAL DAILY
Qty: 180 CAPSULE | Refills: 0 | Status: SHIPPED | OUTPATIENT
Start: 2020-10-29 | End: 2020-11-23

## 2020-10-29 RX ORDER — METOPROLOL TARTRATE 25 MG/1
TABLET, FILM COATED ORAL
Qty: 45 TABLET | Refills: 0 | Status: SHIPPED | OUTPATIENT
Start: 2020-10-29 | End: 2020-11-23

## 2020-10-29 NOTE — TELEPHONE ENCOUNTER
Routing refill request to provider for review/approval because:  BP elevated > [facility] protocol for RN refill

## 2020-10-30 NOTE — TELEPHONE ENCOUNTER
Scheduled pt for wellness video visit with PCP on 11/23/20.  Nerissa Vera MA on 10/30/2020 at 10:38 AM'

## 2020-11-23 ENCOUNTER — VIRTUAL VISIT (OUTPATIENT)
Dept: FAMILY MEDICINE | Facility: CLINIC | Age: 75
End: 2020-11-23
Payer: MEDICARE

## 2020-11-23 DIAGNOSIS — Z00.00 ENCOUNTER FOR MEDICARE ANNUAL WELLNESS EXAM: Primary | ICD-10-CM

## 2020-11-23 DIAGNOSIS — R07.89 ATYPICAL CHEST PAIN: ICD-10-CM

## 2020-11-23 DIAGNOSIS — E11.9 CONTROLLED TYPE 2 DIABETES MELLITUS WITHOUT COMPLICATION, WITHOUT LONG-TERM CURRENT USE OF INSULIN (H): ICD-10-CM

## 2020-11-23 DIAGNOSIS — I10 ESSENTIAL HYPERTENSION WITH GOAL BLOOD PRESSURE LESS THAN 140/90: ICD-10-CM

## 2020-11-23 DIAGNOSIS — N40.0 BENIGN NON-NODULAR PROSTATIC HYPERPLASIA WITHOUT LOWER URINARY TRACT SYMPTOMS: ICD-10-CM

## 2020-11-23 PROCEDURE — G0439 PPPS, SUBSEQ VISIT: HCPCS | Mod: 95 | Performed by: FAMILY MEDICINE

## 2020-11-23 RX ORDER — PIOGLITAZONEHYDROCHLORIDE 30 MG/1
30 TABLET ORAL DAILY
Qty: 90 TABLET | Refills: 3 | Status: SHIPPED | OUTPATIENT
Start: 2020-11-23 | End: 2022-02-02

## 2020-11-23 RX ORDER — METOPROLOL TARTRATE 25 MG/1
TABLET, FILM COATED ORAL
Qty: 45 TABLET | Refills: 0 | Status: SHIPPED | OUTPATIENT
Start: 2020-11-23 | End: 2021-01-27

## 2020-11-23 RX ORDER — FINASTERIDE 5 MG/1
5 TABLET, FILM COATED ORAL DAILY
Qty: 90 TABLET | Refills: 3 | Status: SHIPPED | OUTPATIENT
Start: 2020-11-23 | End: 2022-02-02

## 2020-11-23 RX ORDER — DOXAZOSIN 4 MG/1
4 TABLET ORAL AT BEDTIME
Qty: 90 TABLET | Refills: 1 | Status: SHIPPED | OUTPATIENT
Start: 2020-11-23 | End: 2021-01-27

## 2020-11-23 RX ORDER — TAMSULOSIN HYDROCHLORIDE 0.4 MG/1
0.8 CAPSULE ORAL DAILY
Qty: 180 CAPSULE | Refills: 0 | Status: SHIPPED | OUTPATIENT
Start: 2020-11-23 | End: 2021-02-09

## 2020-11-23 RX ORDER — GLIPIZIDE 5 MG/1
TABLET ORAL
Qty: 270 TABLET | Refills: 3 | Status: SHIPPED | OUTPATIENT
Start: 2020-11-23 | End: 2022-02-02

## 2020-11-23 NOTE — PROGRESS NOTES
"Kushal Bush is a 75 year old male who is being evaluated via a billable video visit.      The patient has been notified of following:     \"This video visit will be conducted via a call between you and your physician/provider. We have found that certain health care needs can be provided without the need for an in-person physical exam.  This service lets us provide the care you need with a video conversation.  If a prescription is necessary we can send it directly to your pharmacy.  If lab work is needed we can place an order for that and you can then stop by our lab to have the test done at a later time.    Video visits are billed at different rates depending on your insurance coverage.  Please reach out to your insurance provider with any questions.    If during the course of the call the physician/provider feels a video visit is not appropriate, you will not be charged for this service.\"    Patient has given verbal consent for Video visit? Yes  How would you like to obtain your AVS? MyChart  If you are dropped from the video visit, the video invite should be resent to: Text to cell phone: [phone]  Will anyone else be joining your video visit? No      Subjective     Kushal Bush is a 75 year old male who presents today via video visit for the following health issues:    HPI     Annual Wellness Visit    Patient has been advised of split billing requirements and indicates understanding: Yes     Are you in the first 12 months of your Medicare Part B coverage?  No    Physical Health:    In general, how would you rate your overall physical health? excellent    Outside of work, how many days during the week do you exercise?6-7 days/week    Outside of work, approximately how many minutes a day do you exercise?30-45 minutes    If you drink alcohol do you typically have >3 drinks per day or >7 drinks per week? No    Do you usually eat at least 4 servings of fruit and vegetables a day, include whole grains & " "fiber and avoid regularly eating high fat or \"junk\" foods? NO    Do you have any problems taking medications regularly? No    Do you have any side effects from medications? none    Needs assistance for the following daily activities: no assistance needed    Which of the following safety concerns are present in your home?  none identified     Hearing impairment: No    In the past 6 months, have you been bothered by leaking of urine? no    There were no vitals taken for this visit.  Weight: Provided by patient  Height: Provided by patient  BMI: Based on patient-provided information  Blood Pressure: Unable to obtain due to video visit    Mental Health:    In general, how would you rate your overall mental or emotional health? excellent  PHQ-2 Score: 0    Do you feel safe in your environment? Yes    Have you ever done Advance Care Planning? (For example, a Health Directive, POLST, or a discussion with a medical provider or your loved ones about your wishes)? Yes, advance care planning is on file.    Fall risk:  Fallen 2 or more times in the past year?: No  Any fall with injury in the past year?: No  click delete button to remove this line now  Cognitive Screening: Unable to complete due to virtual visit; need for additional assessment in future face-to-face visit    Do you have sleep apnea, excessive snoring or daytime drowsiness?: no    Current providers sharing in care for this patient include:   Patient Care Team:  Albert Huggins MD as PCP - General (Family Practice)  Albert Huggins MD as Assigned PCP  Kim Webster RPH as Pharmacist (Pharmacist)  Dejuan Felix MD as MD (Urology)  Albert Huggins MD as Referring Physician (Family Practice)  Dejuan Felix MD as Assigned Surgical Provider    Patient has been advised of split billing requirements and indicates understanding: Yes       Video Start Time: 10:10 AM    Past Medical History:   Diagnosis Date     Asthma      Benign non-nodular " prostatic hyperplasia without lower urinary tract symptoms 7/18/2016     BPH (benign prostatic hyperplasia) 9/14/2011     Campylobacter diarrhea 7/18/2016     CMC arthritis, thumb, degenerative 11/25/2013     Diabetes type 2, uncontrolled (H) 7/18/2016     Essential hypertension with goal blood pressure less than 140/90 7/18/2016     Fatigue, unspecified type 7/18/2016     Heart palpitations 9/14/2011     HTN (hypertension) 9/14/2011     HTN, goal below 140/90 1/4/2012     Hyperglycemia 10/27/2011     Hyperlipidemia LDL goal <100 10/27/2011     Impacted cerumen 9/14/2011     Intermittent asthma 9/14/2011     Lumbar radiculopathy 11/7/2011     Mumps      Palpitations      Post-nasal drainage 9/14/2011     Presbycusis 9/14/2011     Seborrheic keratoses 9/14/2011     Tubular adenoma of colon      Type 2 diabetes mellitus without complications (H) 10/12/2015     Type 2 diabetes, HbA1c goal < 7% (H) 1/6/2012     Type II diabetes mellitus with HbA1C goal between 7 and 8 6/18/2013     Uncontrolled type 2 diabetes mellitus without complication, without long-term current use of insulin 12/27/2016       Past Surgical History:   Procedure Laterality Date     COLONOSCOPY  1/29/14     COLONOSCOPY  1/29/2014    Procedure: COMBINED COLONOSCOPY, SINGLE BIOPSY/POLYPECTOMY BY BIOPSY;  COLONOSCOPY two polyps with jumbo forceps;  Surgeon: Alina Hopkins MD;  Location:  GI     EYE SURGERY Right 12/14/16    cataract removal      ingrown toenail         Family History   Problem Relation Age of Onset     Cancer Mother         age 89, uterine/breast     Heart Disease Father         51       Social History     Tobacco Use     Smoking status: Never Smoker     Smokeless tobacco: Never Used   Substance Use Topics     Alcohol use: Yes     Comment: occas.           Review of Systems   He is feeling well.  He has no visual auditory gastrointestinal pulmonary cardiac musculoskeletal  GI neurologic complaints      Objective    Vitals -  "Patient Reported  Weight (Patient Reported): 78.5 kg (173 lb)  Height (Patient Reported): 172.7 cm (5' 8\")  BMI (Based on Pt Reported Ht/Wt): 26.3      Vitals:  No vitals were obtained today due to virtual visit.    Physical Exam     GENERAL: Healthy, alert and no distress  EYES: Eyes grossly normal to inspection.  No discharge or erythema, or obvious scleral/conjunctival abnormalities.  RESP: No audible wheeze, cough, or visible cyanosis.  No visible retractions or increased work of breathing.    SKIN: Visible skin clear. No significant rash, abnormal pigmentation or lesions.  NEURO: Cranial nerves grossly intact.  Mentation and speech appropriate for age.  PSYCH: Mentation appears normal, affect normal/bright, judgement and insight intact, normal speech and appearance well-groomed.              Assessment & Plan   Problem List Items Addressed This Visit     Essential hypertension with goal blood pressure less than 140/90     Adequately controlled at last measure         Relevant Medications    doxazosin (CARDURA) 4 MG tablet    metoprolol tartrate (LOPRESSOR) 25 MG tablet    Benign non-nodular prostatic hyperplasia without lower urinary tract symptoms      Nocturia x2 on dual medical therapy         Relevant Medications    doxazosin (CARDURA) 4 MG tablet    finasteride (PROSCAR) 5 MG tablet    tamsulosin (FLOMAX) 0.4 MG capsule    Controlled type 2 diabetes mellitus without complication, without long-term current use of insulin (H)     Assess anew.         Relevant Medications    glipiZIDE (GLUCOTROL) 5 MG tablet    pioglitazone (ACTOS) 30 MG tablet    Atypical chest pain     This has resolved         Relevant Medications    metoprolol tartrate (LOPRESSOR) 25 MG tablet      Other Visit Diagnoses     Encounter for Medicare annual wellness exam    -  Primary             BMI:   Estimated body mass index is 26.61 kg/m  as calculated from the following:    Height as of 2/12/20: 1.727 m (5' 8\").    Weight as of 2/12/20: " 79.4 kg (175 lb).   Weight management plan: maintain             Return in about 53 weeks (around 11/29/2021) for Annual Wellness Visit. 6 mos if lab OK. He will aCCEPT IMMUNIZATION    Albert Huggins MD  [facility]      Video-Visit Details    Type of service:  Video Visit    Video End Time:10:31 AM    Originating Location (pt. Location): Home    Distant Location (provider location):  [facility]     Platform used for Video Visit: Johnny

## 2020-11-23 NOTE — PATIENT INSTRUCTIONS
Patient Education   Personalized Prevention Plan  You are due for the preventive services outlined below.  Your care team is available to assist you in scheduling these services.  If you have already completed any of these items, please share that information with your care team to update in your medical record.  Health Maintenance Due   Topic Date Due     Hepatitis C Screening  10/29/1963     Zoster (Shingles) Vaccine (2 of 3) 06/11/2012     Discuss Advance Care Planning  01/21/2020     Eye Exam  04/29/2020     A1C Lab  08/10/2020     Flu Vaccine (1) 09/01/2020     Basic Metabolic Panel  11/04/2020     Cholesterol Lab  11/04/2020     Kidney Microalbumin Urine Test  11/04/2020

## 2020-11-23 NOTE — PROGRESS NOTES
Answers for HPI/ROS submitted by the patient on 11/23/2020   Chronic problems general questions HPI Form  How many servings of fruits and vegetables do you eat daily?: 2-3  On average, how many sweetened beverages do you drink each day (Examples: soda, juice, sweet tea, etc.  Do NOT count diet or artificially sweetened beverages)?: 0  How many minutes a day do you exercise enough to make your heart beat faster?: 30 to 60  How many days a week do you exercise enough to make your heart beat faster?: 5  How many days per week do you miss taking your medication?: 0

## 2020-11-23 NOTE — PROGRESS NOTES
"  SUBJECTIVE:   Kushal Bush is a 75 year old male who presents for Preventive Visit.    {Split Bill scripting  The purpose of this visit is to discuss your medical history and prevent health problems before you are sick. You may be responsible for a co-pay, coinsurance, or deductible if your visit today includes services such as checking on a sore throat, having an x-ray or lab test, or treating and evaluating a new or existing condition :656200}  Patient has been advised of split billing requirements and indicates understanding: {Yes and No:258299}  Are you in the first 12 months of your Medicare Part B coverage?  { :744028::\"No\"}    Physical Health:    In general, how would you rate your overall physical health? { :533470}    Outside of work, how many days during the week do you exercise? { :513428}    Outside of work, approximately how many minutes a day do you exercise?{ :168792}    If you drink alcohol do you typically have >3 drinks per day or >7 drinks per week? { :657155}    Do you usually eat at least 4 servings of fruit and vegetables a day, include whole grains & fiber and avoid regularly eating high fat or \"junk\" foods? { :107823::\"Yes\"}    Do you have any problems taking medications regularly?  { :485910::\"No\"}    Do you have any side effects from medications? { :171545}    Needs assistance for the following daily activities: { :857716}    Which of the following safety concerns are present in your home?  { :108904::\"none identified\"}     Hearing impairment: { :739249}    In the past 6 months, have you been bothered by leaking of urine? { :872810}    Mental Health:    In general, how would you rate your overall mental or emotional health? { :368814}  PHQ-2 Score: 0    Do you feel safe in your environment? { :964880}    Have you ever done Advance Care Planning? (For example, a Health Directive, POLST, or a discussion with a medical provider or your loved ones about your wishes): { " ":045269}    Additional concerns to address?  {If YES, notify patient they may be responsible for a copay, coinsurance or deductible if the visit today includes services such as checking on a sore throat, having an x-ray or lab test, or treating and evaluating a new or existing condition :591760::\"No\"}    Fall risk:  { :379337}  {If any of the above assessments are answered yes, consider ordering appropriate referrals (Optional):962099::\"click delete button to remove this line now\"}  Cognitive Screening: { :510246}    {Do you have sleep apnea, excessive snoring or daytime drowsiness? (Optional):049763}    {Outside tests to abstract? :868711}    {additional problems to add (Optional):597945}    Reviewed and updated as needed this visit by clinical staff  Tobacco  Allergies    Med Hx  Surg Hx  Fam Hx  Soc Hx        Reviewed and updated as needed this visit by Provider                Social History     Tobacco Use     Smoking status: Never Smoker     Smokeless tobacco: Never Used   Substance Use Topics     Alcohol use: Yes     Comment: occas.                             Current providers sharing in care for this patient include: {Rooming staff:  Please update Care Team in Rooming Activity, refresh this note and then delete this statement}  Patient Care Team:  Albert Huggins MD as PCP - General (Family Practice)  Albert Huggins MD as Assigned PCP  Kim Webster [facility] as Pharmacist (Pharmacist)  Dejuan Felix MD as MD (Urology)  Albert Huggins MD as Referring Physician (Family Practice)  Dejuan Felix MD as Assigned Surgical Provider    The following health maintenance items are reviewed in Epic and correct as of today:  Health Maintenance   Topic Date Due     HEPATITIS C SCREENING  10/29/1963     ZOSTER IMMUNIZATION (2 of 3) 06/11/2012     MEDICARE ANNUAL WELLNESS VISIT  11/05/2019     ADVANCE CARE PLANNING  01/21/2020     EYE EXAM  04/29/2020     A1C  08/10/2020     INFLUENZA VACCINE (1) " "2020     BMP  2020     LIPID  2020     MICROALBUMIN  2020     DIABETIC FOOT EXAM  2020     ANNUAL REVIEW OF HM ORDERS  2020     FALL RISK ASSESSMENT  2020     DTAP/TDAP/TD IMMUNIZATION (2 - Td) 2023     COLORECTAL CANCER SCREENING  2024     PHQ-2  Completed     Pneumococcal Vaccine: 65+ Years  Completed     Pneumococcal Vaccine: Pediatrics (0 to 5 Years) and At-Risk Patients (6 to 64 Years)  Aged Out     IPV IMMUNIZATION  Aged Out     MENINGITIS IMMUNIZATION  Aged Out     {Chronicprobdata (Optional):455595}  {Decision Support (Optional):623251}    ROS:  {ROS COMP:934348}    OBJECTIVE:   There were no vitals taken for this visit. Estimated body mass index is 26.61 kg/m  as calculated from the following:    Height as of 20: 1.727 m (5' 8\").    Weight as of 20: 79.4 kg (175 lb).  EXAM:   {Exam :826523}    {Diagnostic Test Results (Optional):535408::\"Diagnostic Test Results:\",\"Labs reviewed in Epic\"}    ASSESSMENT / PLAN:   {Dia Picklist:860180}    Patient has been advised of split billing requirements and indicates understanding: {YES / NO:041041::\"Yes\"}    COUNSELING:  {Medicare Counselin}    Estimated body mass index is 26.61 kg/m  as calculated from the following:    Height as of 20: 1.727 m (5' 8\").    Weight as of 20: 79.4 kg (175 lb).    {Weight Management Plan (ACO) Complete if BMI is abnormal-  Ages 18-64  BMI >24.9.  Age 65+ with BMI <23 or >30 (Optional):378715}    He reports that he has never smoked. He has never used smokeless tobacco.    Appropriate preventive services were discussed with this patient, including applicable screening as appropriate for cardiovascular disease, diabetes, osteopenia/osteoporosis, and glaucoma.  As appropriate for age/gender, discussed screening for colorectal cancer, prostate cancer, breast cancer, and cervical cancer. Checklist reviewing preventive services available has been given to the " patient.    Reviewed patients plan of care and provided an AVS. The {CarePlan:995596} for Kushal meets the Care Plan requirement. This Care Plan has been established and reviewed with the {PATIENT, FAMILY MEMBER, CAREGIVER:575530}.    Counseling Resources:  ATP IV Guidelines  Pooled Cohorts Equation Calculator  Breast Cancer Risk Calculator  BRCA-Related Cancer Risk Assessment: FHS-7 Tool  FRAX Risk Assessment  ICSI Preventive Guidelines  Dietary Guidelines for Americans, 2010  USDA's MyPlate  ASA Prophylaxis  Lung CA Screening    Albert Huggins MD  [facility]

## 2020-11-24 NOTE — PROGRESS NOTES
Controlled type 2 diabetes mellitus without complication, without long-term current use of insulin (H) he is no longer trying to lose weight.  However, he feels well without urinary or visual symptoms  Hemoglobin A1C   Date Value Ref Range Status   02/10/2020 6.9 (H) 0 - 5.6 % Final     Comment:     Normal <5.7% Prediabetes 5.7-6.4%  Diabetes 6.5% or higher - adopted from ADA   consensus guidelines.       He is due to be reassessed  There were no vitals taken for this visit.  ASSESSMENT / PLAN:      (E11.9) Controlled type 2 diabetes mellitus without complication, without long-term current use of insulin (H)  Comment: May need laboratory study  Plan: glipiZIDE (GLUCOTROL) 5 MG tablet, pioglitazone        (ACTOS) 30 MG tablet     Lab only            Albert Huggins MD

## 2020-12-14 ENCOUNTER — HEALTH MAINTENANCE LETTER (OUTPATIENT)
Age: 75
End: 2020-12-14

## 2021-01-04 ENCOUNTER — MYC MEDICAL ADVICE (OUTPATIENT)
Dept: FAMILY MEDICINE | Facility: CLINIC | Age: 76
End: 2021-01-04

## 2021-01-04 DIAGNOSIS — H90.3 ASYMMETRICAL SENSORINEURAL HEARING LOSS: Primary | ICD-10-CM

## 2021-01-25 ENCOUNTER — TRANSFERRED RECORDS (OUTPATIENT)
Dept: HEALTH INFORMATION MANAGEMENT | Facility: CLINIC | Age: 76
End: 2021-01-25

## 2021-01-25 LAB — RETINOPATHY: NEGATIVE

## 2021-01-27 ENCOUNTER — PATIENT OUTREACH (OUTPATIENT)
Dept: FAMILY MEDICINE | Facility: CLINIC | Age: 76
End: 2021-01-27

## 2021-01-27 NOTE — TELEPHONE ENCOUNTER
Call made to pt to really the following:    Meds sent 90 d  Lab 7 mos overdue  Needs F2F visit OR lab only followed by virtual visit  Referred to       [facility]   [address] [place] [address]   Phone: [phone]         Pt eschews medicine: his compliance needs to be acknowledged and rewarded  Professional   He is a COVID candidate: we will know tomorrow, perhaps  Albert Huggins MD                 Dear Dr Huggins,  Three short items:  On my way to [facility] to renew two medications: Metoprolol 25mg and Doxazosin 4mg.  Both need your approval to renew.  My wife and I are wondering if we can get on a call list at [facility] for a Covid vaccine when it becomes available...if there is such a list.  We're supposedly eligible due to age, but trying to get on the [place]'s crashed website is impossible.  We're not overly anxious, but would rather sign up through [facility] for when such a vaccine becomes available.  3rd item: No word from ear specialist about possible hearing issues in my left ear.  Can you please ask them to call and leave a short message.  We will call back.  Kind regards,  Ed H     Message left for pt to call this RN and to schedule appointments for lab and a virtual visit w/Dr. Huggins.  Pt told [facility] currently does not have the Covid vaccine and to continue to call in and check on the progress and may also call RN.    Pt also told referral made to [facility] for pt's left ear issue.      Madisyn Hansen, Registered Nurse, Patient Advocate Liaison [facility]  [phone]

## 2021-01-28 ENCOUNTER — PATIENT OUTREACH (OUTPATIENT)
Dept: FAMILY MEDICINE | Facility: CLINIC | Age: 76
End: 2021-01-28

## 2021-01-28 NOTE — TELEPHONE ENCOUNTER
Pt returned call and scheduled appointment for fasting labs (lipids, Hepatitis C Antibody, BMP, urine albumin & hbg A1C) on Mon 2/1/2021 @ 0800 and a f/u appointment w/Dr. Huggins for a med check/review labs on Tue 2/2/2021 @ 11:00 AM and a 10:40 arrival time.    Pt transferred to central scheduling [phone] to schedule his Covid vaccination.    Madisyn Hansen, Registered Nurse, Patient Advocate Liaison [facility] [phone]

## 2021-01-28 NOTE — TELEPHONE ENCOUNTER
Madisyn Hansen, Registered Nurse, Patient Advocate Liaison [facility] [phone]

## 2021-01-30 ENCOUNTER — IMMUNIZATION (OUTPATIENT)
Dept: NURSING | Facility: CLINIC | Age: 76
End: 2021-01-30
Payer: MEDICARE

## 2021-01-30 PROCEDURE — 91300 PR COVID VAC PFIZER DIL RECON 30 MCG/0.3 ML IM: CPT

## 2021-01-30 PROCEDURE — 0001A PR COVID VAC PFIZER DIL RECON 30 MCG/0.3 ML IM: CPT

## 2021-02-01 DIAGNOSIS — E11.9 CONTROLLED TYPE 2 DIABETES MELLITUS WITHOUT COMPLICATION, WITHOUT LONG-TERM CURRENT USE OF INSULIN (H): ICD-10-CM

## 2021-02-01 DIAGNOSIS — Z00.00 ENCOUNTER FOR MEDICARE ANNUAL WELLNESS EXAM: ICD-10-CM

## 2021-02-01 DIAGNOSIS — I10 ESSENTIAL HYPERTENSION WITH GOAL BLOOD PRESSURE LESS THAN 140/90: ICD-10-CM

## 2021-02-01 LAB
ANION GAP SERPL CALCULATED.3IONS-SCNC: 5 MMOL/L (ref 3–14)
BUN SERPL-MCNC: 25 MG/DL (ref 7–30)
CALCIUM SERPL-MCNC: 9.8 MG/DL (ref 8.5–10.1)
CHLORIDE SERPL-SCNC: 105 MMOL/L (ref 94–109)
CHOLEST SERPL-MCNC: 235 MG/DL
CO2 SERPL-SCNC: 27 MMOL/L (ref 20–32)
CREAT SERPL-MCNC: 0.98 MG/DL (ref 0.66–1.25)
CREAT UR-MCNC: 87 MG/DL
GFR SERPL CREATININE-BSD FRML MDRD: 74 ML/MIN/{1.73_M2}
GLUCOSE SERPL-MCNC: 146 MG/DL (ref 70–99)
HBA1C MFR BLD: 7.5 % (ref 0–5.6)
HCV AB SERPL QL IA: NONREACTIVE
HDLC SERPL-MCNC: 52 MG/DL
LDLC SERPL CALC-MCNC: 158 MG/DL
MICROALBUMIN UR-MCNC: <5 MG/L
MICROALBUMIN/CREAT UR: NORMAL MG/G CR (ref 0–17)
NONHDLC SERPL-MCNC: 183 MG/DL
POTASSIUM SERPL-SCNC: 4.4 MMOL/L (ref 3.4–5.3)
SODIUM SERPL-SCNC: 137 MMOL/L (ref 133–144)
TRIGL SERPL-MCNC: 124 MG/DL

## 2021-02-01 PROCEDURE — 80061 LIPID PANEL: CPT | Performed by: FAMILY MEDICINE

## 2021-02-01 PROCEDURE — 36415 COLL VENOUS BLD VENIPUNCTURE: CPT | Performed by: FAMILY MEDICINE

## 2021-02-01 PROCEDURE — 86803 HEPATITIS C AB TEST: CPT | Performed by: FAMILY MEDICINE

## 2021-02-01 PROCEDURE — 83036 HEMOGLOBIN GLYCOSYLATED A1C: CPT | Performed by: FAMILY MEDICINE

## 2021-02-01 PROCEDURE — 80048 BASIC METABOLIC PNL TOTAL CA: CPT | Performed by: FAMILY MEDICINE

## 2021-02-01 PROCEDURE — 82043 UR ALBUMIN QUANTITATIVE: CPT | Performed by: FAMILY MEDICINE

## 2021-02-02 NOTE — RESULT ENCOUNTER NOTE
Diabetes is a little worse:  that is what it does.  Still good enough, though.  Albert Huggins MD

## 2021-02-09 ENCOUNTER — OFFICE VISIT (OUTPATIENT)
Dept: FAMILY MEDICINE | Facility: CLINIC | Age: 76
End: 2021-02-09
Payer: MEDICARE

## 2021-02-09 VITALS
TEMPERATURE: 97.5 F | WEIGHT: 182 LBS | BODY MASS INDEX: 27.58 KG/M2 | RESPIRATION RATE: 18 BRPM | DIASTOLIC BLOOD PRESSURE: 70 MMHG | HEART RATE: 71 BPM | HEIGHT: 68 IN | SYSTOLIC BLOOD PRESSURE: 153 MMHG

## 2021-02-09 DIAGNOSIS — R07.89 ATYPICAL CHEST PAIN: ICD-10-CM

## 2021-02-09 DIAGNOSIS — L57.0 ACTINIC KERATOSIS: Primary | ICD-10-CM

## 2021-02-09 DIAGNOSIS — I10 ESSENTIAL HYPERTENSION WITH GOAL BLOOD PRESSURE LESS THAN 140/90: ICD-10-CM

## 2021-02-09 DIAGNOSIS — H40.003 GLAUCOMA SUSPECT, BILATERAL: ICD-10-CM

## 2021-02-09 DIAGNOSIS — N40.0 BENIGN NON-NODULAR PROSTATIC HYPERPLASIA WITHOUT LOWER URINARY TRACT SYMPTOMS: ICD-10-CM

## 2021-02-09 DIAGNOSIS — E11.65 TYPE 2 DIABETES MELLITUS WITH HYPERGLYCEMIA, WITHOUT LONG-TERM CURRENT USE OF INSULIN (H): ICD-10-CM

## 2021-02-09 PROCEDURE — 99207 PR FOOT EXAM NO CHARGE: CPT | Mod: 25 | Performed by: FAMILY MEDICINE

## 2021-02-09 PROCEDURE — 99214 OFFICE O/P EST MOD 30 MIN: CPT | Performed by: FAMILY MEDICINE

## 2021-02-09 RX ORDER — DOXAZOSIN 4 MG/1
4 TABLET ORAL AT BEDTIME
Qty: 90 TABLET | Refills: 0 | Status: SHIPPED | OUTPATIENT
Start: 2021-02-09 | End: 2022-02-02

## 2021-02-09 RX ORDER — METOPROLOL TARTRATE 25 MG/1
TABLET, FILM COATED ORAL
Qty: 45 TABLET | Refills: 1 | Status: SHIPPED | OUTPATIENT
Start: 2021-02-09 | End: 2021-05-03 | Stop reason: DRUGHIGH

## 2021-02-09 RX ORDER — LATANOPROST 50 UG/ML
1 SOLUTION/ DROPS OPHTHALMIC DAILY
COMMUNITY
Start: 2021-02-09

## 2021-02-09 RX ORDER — TAMSULOSIN HYDROCHLORIDE 0.4 MG/1
0.8 CAPSULE ORAL DAILY
Qty: 180 CAPSULE | Refills: 3 | Status: SHIPPED | OUTPATIENT
Start: 2021-02-09 | End: 2022-02-02

## 2021-02-09 ASSESSMENT — MIFFLIN-ST. JEOR: SCORE: 1530.05

## 2021-02-09 NOTE — PROGRESS NOTES
Assessment & Plan   Problem List Items Addressed This Visit     Actinic keratosis - Primary     L cheek, erythematous papule, 1.5 cm. refer         Relevant Medications    timolol hemihydrate (BETIMOL) 0.25 % ophthalmic solution    latanoprost (XALATAN) 0.005 % ophthalmic solution    Other Relevant Orders    DERMATOLOGY ADULT REFERRAL    Atypical chest pain     Absent on beta blockade. Continue         Relevant Medications    metoprolol tartrate (LOPRESSOR) 25 MG tablet    Benign non-nodular prostatic hyperplasia without lower urinary tract symptoms     Follows with Urology         Relevant Medications    tamsulosin (FLOMAX) 0.4 MG capsule    doxazosin (CARDURA) 4 MG tablet    Essential hypertension with goal blood pressure less than 140/90     Borderline. Hypotensive last summer. Tolerate unless persistent         Relevant Medications    metoprolol tartrate (LOPRESSOR) 25 MG tablet    doxazosin (CARDURA) 4 MG tablet    Other Relevant Orders    MED THERAPY MANAGE REFERRAL    Glaucoma suspect, bilateral     His last note will not open. Meds changed, updated         Relevant Medications    timolol hemihydrate (BETIMOL) 0.25 % ophthalmic solution    latanoprost (XALATAN) 0.005 % ophthalmic solution    Type 2 diabetes mellitus with hyperglycemia, without long-term current use of insulin (H)     Adequately controlled at last measure. He would like to see MTM in 3 mos         Relevant Orders    FOOT EXAM (Completed)    MED THERAPY MANAGE REFERRAL                        Return in about 6 months (around 8/9/2021) for recheck.    Albert Huggins MD  [facility]    Agustin Fatima is a 75 year old who presents to clinic today for the following health issues     History of Present Illness       He eats 0-1 servings of fruits and vegetables daily.He consumes 0 sweetened beverage(s) daily.He exercises with enough effort to increase his heart rate 30 to 60 minutes per day.  He exercises with  "enough effort to increase his heart rate 7 days per week.   He is taking medications regularly.       -sore on left side of cheek past 6 months getting irritated when shaving     Diabetes Follow-up      How often are you checking your blood sugar? daily averages 120    What concerns do you have today about your diabetes? None     Do you have any of these symptoms? (Select all that apply)  No numbness or tingling in feet.  No redness, sores or blisters on feet.  No complaints of excessive thirst.  No reports of blurry vision.  No significant changes to weight.      BP Readings from Last 2 Encounters:   02/09/21 (!) 153/70   02/12/20 (!) 140/74     Hemoglobin A1C (%)   Date Value   02/01/2021 7.5 (H)   02/10/2020 6.9 (H)     LDL Cholesterol Calculated (mg/dL)   Date Value   02/01/2021 158 (H)   11/04/2019 160 (H)       Hypertension Follow-up      Do you check your blood pressure regularly outside of the clinic? No     Are you following a low salt diet? Yes    Are your blood pressures ever more than 140 on the top number (systolic) OR more   than 90 on the bottom number (diastolic), for example 140/90? No        Review of Systems     Great mood  no neuropathic cardiac systemic sx      Objective    BP (!) 153/70 (BP Location: Right arm, Patient Position: Chair, Cuff Size: Adult Large)   Pulse 71   Temp 97.5  F (36.4  C) (Oral)   Resp 18   Ht 1.727 m (5' 8\")   Wt 82.6 kg (182 lb)   BMI 27.67 kg/m    Body mass index is 27.67 kg/m .  Physical Exam     Feet warm dry. Subungual hematoma ole L II      Albert Huggins MD          "

## 2021-02-20 ENCOUNTER — IMMUNIZATION (OUTPATIENT)
Dept: NURSING | Facility: CLINIC | Age: 76
End: 2021-02-20
Attending: NURSE PRACTITIONER
Payer: MEDICARE

## 2021-02-20 PROCEDURE — 91300 PR COVID VAC PFIZER DIL RECON 30 MCG/0.3 ML IM: CPT

## 2021-02-20 PROCEDURE — 0002A PR COVID VAC PFIZER DIL RECON 30 MCG/0.3 ML IM: CPT

## 2021-04-29 NOTE — PROGRESS NOTES
Medication Therapy Management (MTM) Encounter    ASSESSMENT:                            Medication Adherence/Access: No issues identified    Type 2 Diabetes: Patient is meeting A1c goal of < 8%. Self monitoring of blood glucose is at goal of fasting  mg/dL and post prandial < 180 mg/dL but would benefit from weight loss. He would like to try Jardiance if able to get through  coupon or [facility] assistance program d/t 3 month supply is $455 instead of glipizide or pioglitazone. Because his A1c is higher today, did recommend increasing glipizide dose and taking prior to meals.     Hypertension: Borderline high so continue to monitor. Recommend weight loss and increasing exercise. Also recommend changing metoprolol tartrate to succinate d/t once daily dosing. Okay to split in half metoprolol succinate tablets.     Hyperlipidemia: Patient is not on moderate intensity statin which is indicated based on 2019 ACC/AHA guidelines for lipid management.  He declines statin therapy. Recommend weight loss and improving diet/exercise.     BPH: Continue to monitor. Once TURP, reconsider discontinuing tamsulosin or doxazosin d/t duplicate therapy.   Glaucoma: stable  Immunizations: Recommend checking cost of Shingrix at pharmacy.     PLAN:                            1. Check if Jardiance is an option to start instead of pioglitazone with [facility] assistance program.  2. Change metoprolol tartrate to metoprolol succinate 12.5mg daily.     Follow-up: Return in about 3 months (around 8/3/2021) for Medication Therapy Management Pharmacist, Lab Work.      SUBJECTIVE/OBJECTIVE:                          Kushal Bush is a 75 year old male coming in for an initial visit. He was referred to me from Dr. Huggins. Last visit was 2/10/20 with Kim Webster Rp.      Reason for visit: A1c lab.    Tobacco: He reports that he has never smoked. He has never used smokeless tobacco.  Alcohol: not currently using  Personal Healthcare Goals:  lose weight, keep A1c <8%, have more energy to exercise    Medication Adherence/Access: no issues reported but reports no taking glipizide or pioglitazone as prescribed, will not take medications if side effects or costly per patient    Type 2 Diabetes:  Currently taking glipizide 5mg once daily bedtime (only takes twice daily if blood glucose higher >130) and pioglitazone 15mg daily. Patient is not experiencing side effects. Was on Januvia but too expensive. Will not go on any medication if too expensive or side effects.   Blood sugar monitorin time(s) daily. Ranges (patient reported): Fasting- 100-120,  Post-prandial - 160  Symptoms of low blood sugar? none  Symptoms of high blood sugar? fatigue  Eye exam: up to date  Foot exam: up to date  Diet/Exercise: Raisin bran in the morning. proplant supplement with coconut milk for lunch. Dinner salad, peas, and salmon. Doesn't fast well and wants to get better. Has Diabetes code book and has read it. Brings it in today.   Aspirin: Not taking due to age  Statin: No   ACEi/ARB: No.   Urine Albumin:   Lab Results   Component Value Date    UMALCR Unable to calculate due to low value 2021      Lab Results   Component Value Date    A1C 7.8 2021    A1C 7.5 2021    A1C 6.9 02/10/2020    A1C 7.1 2019    A1C 7.2 2019     Wt Readings from Last 3 Encounters:   21 184 lb 12.8 oz (83.8 kg)   21 182 lb (82.6 kg)   20 175 lb (79.4 kg)       Hypertension: Current medications include doxazosin 4mg bedtime, metoprolol 12.5mg once daily.  Patient does not self-monitor blood pressure.  Patient reports no current medication side effects.  BP Readings from Last 3 Encounters:   21 136/78   21 (!) 153/70   20 (!) 140/74       Hyperlipidemia: Current therapy includes red yeast extract 2 times a week (not daily). Declines statin.  Patient reports no significant myalgias or other side effects.  The 10-year ASCVD risk score (Becky  PRABHA Parker, et al., 2013) is: 55%    Values used to calculate the score:      Age: 75 years      Sex: Male      Is Non- : No      Diabetic: Yes      Tobacco smoker: No      Systolic Blood Pressure: 136 mmHg      Is BP treated: Yes      HDL Cholesterol: 52 mg/dL      Total Cholesterol: 235 mg/dL  Recent Labs   Lab Test 02/01/21  0749 11/04/19  0951   CHOL 235* 234*   HDL 52 49   * 160*   TRIG 124 125       BPH: Patient taking doxazosin 4mg daily, finasteride 5mg daily, and tamsulosin 0.8mg once daily. On tamsulosin and doxazosin b/c was going to get TURP procedure last year but then didn't. Not sure if all these medications effective but thinks he'll need the surgery eventually. Didn't get b/c cost. Following urology.     Glaucoma: Patient taking latanoprost 0.005% both eyes daily and timolol 0.5% daily. No issues.     Immunizations: Due for Shingrix. Patient concerned w/ cost.  Most Recent Immunizations   Administered Date(s) Administered     COVID-19,PF,Pfizer 02/20/2021     FLU 6-35 months 09/04/2020     Influenza (High Dose) 3 valent vaccine 12/05/2019     Influenza (IIV3) PF 12/12/2012     Influenza Intranasal Vaccine 4 valent 10/30/2014     Influenza Vaccine IM > 6 months Valent IIV4 11/25/2013     Influenza, Quad, High Dose, Pf, 65yr + 09/06/2020     Pneumo Conj 13-V (2010&after) 11/21/2016     Pneumococcal 23 valent 04/16/2012     TDAP Vaccine (Adacel) 01/11/2013     Zoster vaccine, live 04/16/2012       Today's Vitals: /78   Wt 184 lb 12.8 oz (83.8 kg)   BMI 28.10 kg/m    ----------------    I spent 45 minutes with this patient today. All changes were made via collaborative practice agreement with Albert Huggins. A copy of the visit note was provided to the patient's primary care provider.    The patient was given a summary of these recommendations.     Lisa Celeste, PharmD  Medication Therapy Management Provider, [facility]  Pager:  [phone]        Medication Therapy Recommendations  Essential hypertension with goal blood pressure less than 140/90    Current Medication: metoprolol succinate ER (TOPROL-XL) 25 MG 24 hr tablet   Rationale: Dosage form inappropriate - Ineffective medication - Effectiveness   Recommendation: Change Medication Formulation  - metoprolol tartrate 12.5 mg Tabs half-tab - Change metoprolol tartrate to metoprolol succinate 12.5mg daily.   Status: Accepted per CPA         Type 2 diabetes mellitus with hyperglycemia, without long-term current use of insulin (H)    Current Medication: pioglitazone (ACTOS) 30 MG tablet   Rationale: Undesirable effect - Adverse medication event - Safety   Recommendation: Change Medication - Jardiance 25 MG Tabs - If affordable, change pioglitazone to Jardiance 12.5mg daily.   Status: Accepted per CPA

## 2021-05-03 ENCOUNTER — OFFICE VISIT (OUTPATIENT)
Dept: PHARMACY | Facility: CLINIC | Age: 76
End: 2021-05-03
Attending: FAMILY MEDICINE
Payer: COMMERCIAL

## 2021-05-03 VITALS — DIASTOLIC BLOOD PRESSURE: 78 MMHG | SYSTOLIC BLOOD PRESSURE: 136 MMHG | WEIGHT: 184.8 LBS | BODY MASS INDEX: 28.1 KG/M2

## 2021-05-03 DIAGNOSIS — H40.003 GLAUCOMA SUSPECT, BILATERAL: ICD-10-CM

## 2021-05-03 DIAGNOSIS — I10 ESSENTIAL HYPERTENSION WITH GOAL BLOOD PRESSURE LESS THAN 140/90: ICD-10-CM

## 2021-05-03 DIAGNOSIS — E11.65 TYPE 2 DIABETES MELLITUS WITH HYPERGLYCEMIA, WITHOUT LONG-TERM CURRENT USE OF INSULIN (H): Primary | ICD-10-CM

## 2021-05-03 DIAGNOSIS — E78.5 HYPERLIPIDEMIA LDL GOAL <100: ICD-10-CM

## 2021-05-03 DIAGNOSIS — N40.0 BENIGN NON-NODULAR PROSTATIC HYPERPLASIA WITHOUT LOWER URINARY TRACT SYMPTOMS: ICD-10-CM

## 2021-05-03 DIAGNOSIS — E11.65 TYPE 2 DIABETES MELLITUS WITH HYPERGLYCEMIA, WITHOUT LONG-TERM CURRENT USE OF INSULIN (H): ICD-10-CM

## 2021-05-03 DIAGNOSIS — Z23 ENCOUNTER FOR IMMUNIZATION: ICD-10-CM

## 2021-05-03 LAB — HBA1C MFR BLD: 7.8 % (ref 0–5.6)

## 2021-05-03 PROCEDURE — 36415 COLL VENOUS BLD VENIPUNCTURE: CPT | Performed by: FAMILY MEDICINE

## 2021-05-03 PROCEDURE — 99607 MTMS BY PHARM ADDL 15 MIN: CPT | Performed by: PHARMACIST

## 2021-05-03 PROCEDURE — 83036 HEMOGLOBIN GLYCOSYLATED A1C: CPT | Performed by: FAMILY MEDICINE

## 2021-05-03 PROCEDURE — 99605 MTMS BY PHARM NP 15 MIN: CPT | Performed by: PHARMACIST

## 2021-05-03 RX ORDER — TIMOLOL MALEATE 5 MG/ML
SOLUTION/ DROPS OPHTHALMIC
COMMUNITY
Start: 2021-01-25

## 2021-05-03 RX ORDER — METOPROLOL SUCCINATE 25 MG/1
12.5 TABLET, EXTENDED RELEASE ORAL DAILY
Qty: 90 TABLET | Refills: 1 | Status: SHIPPED | OUTPATIENT
Start: 2021-05-03 | End: 2021-06-17

## 2021-05-03 NOTE — PATIENT INSTRUCTIONS
Recommendations from today's MTM visit:                                                       1. Jardiance, Ozempic, Rybelsus and Januvia   2. Move your glipizide to before meals - about 10-15 min. Okay to take 5mg twice daily.   3. Change metoprolol to extended release     It was great to speak with you today.  I value your experience and would be very thankful for your time with providing feedback on our clinic survey. You may receive a survey via email or text message in the next few days.     To schedule another MTM appointment, please call the clinic directly or you may call the MTM scheduling line at [phone] or toll-free at [phone].     My Clinical Pharmacist's contact information:                                                      Please feel free to contact me with any questions or concerns you have.      Lisa Celeste, PharmD  Medication Therapy Management Provider, [facility]

## 2021-05-03 NOTE — Clinical Note
Malick Landa - Can you reach out to Kushal please to see if he would qualify for any  assistance program for Jardiance medication coverage? Otherwise it is costing him >$400 through his insurance. Thanks!

## 2021-06-17 DIAGNOSIS — I10 ESSENTIAL HYPERTENSION WITH GOAL BLOOD PRESSURE LESS THAN 140/90: ICD-10-CM

## 2021-06-17 RX ORDER — METOPROLOL SUCCINATE 25 MG/1
25 TABLET, EXTENDED RELEASE ORAL DAILY
Qty: 90 TABLET | Refills: 1 | Status: SHIPPED | OUTPATIENT
Start: 2021-06-17 | End: 2022-02-02

## 2021-06-17 NOTE — TELEPHONE ENCOUNTER
"\"Patient states they are taking 1 tablet daily not 1/2.  Please send new Prescription with correct dosing.\"    "

## 2021-07-17 DIAGNOSIS — I10 ESSENTIAL HYPERTENSION WITH GOAL BLOOD PRESSURE LESS THAN 140/90: ICD-10-CM

## 2021-07-17 DIAGNOSIS — R07.89 ATYPICAL CHEST PAIN: ICD-10-CM

## 2021-07-19 RX ORDER — METOPROLOL TARTRATE 25 MG/1
TABLET, FILM COATED ORAL
Qty: 45 TABLET | Refills: 0 | OUTPATIENT
Start: 2021-07-19

## 2021-07-19 NOTE — TELEPHONE ENCOUNTER
Change in therapy, Has refill on file. Patient now taking 1 tablet by mouth daily.   90 tablet 1 6/17/2021     Kalyan SHERIFF RN

## 2021-10-02 ENCOUNTER — HEALTH MAINTENANCE LETTER (OUTPATIENT)
Age: 76
End: 2021-10-02

## 2021-11-27 ENCOUNTER — HEALTH MAINTENANCE LETTER (OUTPATIENT)
Age: 76
End: 2021-11-27

## 2021-12-23 NOTE — TELEPHONE ENCOUNTER
Faxed to [facility] [phone]. Ruth Behrens.    [FreeTextEntry1] : I have seen and evaluated patient and have corroborated all nursing input into this note.  Patient had 3 days of rectal bleeding after pushing hard to have a bowel movement.  The bleeding stopped and the patient has been well for the past 3 weeks.  He has a moderate to large right anterior hemorrhoid.  However, he is now asymptomatic and his previous symptoms were related to a hard bowel movement.  Therefore, there is no indication for acute intervention today.  If the patient develops recurrent symptoms he will return to my office for internal hemorrhoid rubber band ligation.  I reviewed indications, risk, benefits, alternatives including but not limited to bleeding, infection, and failure.  All questions answered.

## 2022-01-22 ENCOUNTER — HEALTH MAINTENANCE LETTER (OUTPATIENT)
Age: 77
End: 2022-01-22

## 2022-02-01 NOTE — PROGRESS NOTES
Medication Therapy Management (MTM) Encounter    ASSESSMENT:                            Medication Adherence/Access: No issues identified    Type 2 Diabetes: Patient is meeting A1c goal of < 8% but borderline high. Self monitoring of blood glucose is at goal of post prandial < 180 mg/dL but unsure about fasting readings. Recommend checking variety of blood glucose readings and increasing glipizide dose to twice daily when eating higher carb meals.    Hypertension: Patient would benefit from the following changes - increasing the dose of metoprolol as prescribed as he runs borderline high and often higher readings than today's blood pressure reading in office.    Hyperlipidemia: Patient is not on moderate intensity statin which is indicated based on 2019 ACC/AHA guidelines for lipid management.  He declines statin therapy. Recommend weight loss and improving diet/exercise. Rechecking lipid panel today.     BPH: Will refill medications for now until annual PCP visit - due and recommended he schedule within 3 months.     Glaucoma: stable    PLAN:                            1. Increase metoprolol XL to 25 mg daily.   2. Increase glipizide 5mg to taking twice daily more frequently before meals or higher carb meals.   3. Check occasional fasting and post-prandial blood glucose. Blood glucose goals: fasting in the morning should be  mg/dL and 2-3 hours after a meal should be less than 180 mg/dL    Follow-up: Return in about 1 month (around 3/2/2022) for primary care doctor visit. - MTM in 6 months to recheck A1c    SUBJECTIVE/OBJECTIVE:                          Kushal Bush is a 76 year old male coming in for a follow-up visit.  Today's visit is a follow-up MTM visit from 5/3.     Reason for visit: Lab recheck.    Tobacco: He reports that he has never smoked. He has never used smokeless tobacco.  Alcohol: not currently using  Personal Healthcare Goals: lose weight, keep A1c <8%, have more energy to  exercise    Medication Adherence/Access: no issues reported but will not take medications if side effects or costly per patient - doesn't like to come to doctor office for visits, busy so gets close to running out of medications sometimes    Type 2 Diabetes:  Currently taking glipizide 5mg once daily and pioglitazone 30 mg daily. Patient is not experiencing side effects. Was on Januvia in the past but too expensive. Will not go on any medication if too expensive or side effects.   Blood sugar monitorin time(s) daily. Ranges (patient reported): post-prandial -   Symptoms of low blood sugar? none  Symptoms of high blood sugar? fatigue  Eye exam: due  Foot exam: due  Diet/Exercise: Has Diabetes code book and has read it. Small breakfast and small lunch - plant. Veggies every day. Rare donut or bagel - will have one today. Loves popcorn - tries to limit. Walks most days. Tries to exercise more which helps blood glucose.  Aspirin: Not taking due to age  Statin: No   ACEi/ARB: No.   Urine Albumin:   Lab Results   Component Value Date    UMALCR Unable to calculate due to low value 2021     Lab Results   Component Value Date    A1C 7.9 2022    A1C 7.8 2021    A1C 7.5 2021    A1C 6.9 02/10/2020    A1C 7.1 2019    A1C 7.2 2019     Wt Readings from Last 3 Encounters:   22 181 lb 4.8 oz (82.2 kg)   21 184 lb 12.8 oz (83.8 kg)   21 182 lb (82.6 kg)       Hypertension: Current medications include doxazosin 2 mg bedtime and metoprolol 12.5 mg once daily.  Patient does not self-monitor blood pressure but states normally his blood pressure is high - systolic in the 130-140's.  Patient reports no current medication side effects.  BP Readings from Last 3 Encounters:   22 136/82   21 136/78   21 (!) 153/70       Hyperlipidemia: Current therapy includes red yeast extract supplement some days. Declines statin.  Patient reports no significant myalgias or  other side effects.  Recent Labs   Lab Test 02/01/21  0749 11/04/19  0951   CHOL 235* 234*   HDL 52 49   * 160*   TRIG 124 125       BPH: Patient taking doxazosin 2mg daily, finasteride 5mg daily (although has not been taking every day d/t running low on supply, no refills), and tamsulosin 0.8mg once daily. On tamsulosin and doxazosin b/c was going to get TURP procedure but then didn't. Not sure if all these medications effective but thinks he'll need the surgery eventually. Didn't get b/c cost. Following urology.     Glaucoma: Patient taking latanoprost 0.005% both eyes daily and timolol 0.5% daily. No issues.       Today's Vitals: /82   Wt 181 lb 4.8 oz (82.2 kg)   BMI 27.57 kg/m    ----------------      I spent 40 minutes with this patient today. All changes were made via collaborative practice agreement with Albert Huggins MD. A copy of the visit note was provided to the patient's provider(s).    The patient was given a summary of these recommendations.     Lisa Celeste, PharmD  Medication Therapy Management Provider, [facility]  Pager: [phone]     Medication Therapy Recommendations  Essential hypertension with goal blood pressure less than 140/90    Current Medication: metoprolol succinate ER (TOPROL-XL) 25 MG 24 hr tablet   Rationale: Dose too low - Dosage too low - Effectiveness   Recommendation: Increase Dose   Status: Accepted per CPA         Type 2 diabetes mellitus with hyperglycemia, without long-term current use of insulin (H)    Current Medication: glipiZIDE (GLUCOTROL) 5 MG tablet   Rationale: Dose too low - Dosage too low - Effectiveness   Recommendation: Increase Frequency   Status: Accepted per CPA

## 2022-02-02 ENCOUNTER — LAB (OUTPATIENT)
Dept: LAB | Facility: CLINIC | Age: 77
End: 2022-02-02
Payer: MEDICARE

## 2022-02-02 ENCOUNTER — DOCUMENTATION ONLY (OUTPATIENT)
Dept: LAB | Facility: CLINIC | Age: 77
End: 2022-02-02
Payer: MEDICARE

## 2022-02-02 ENCOUNTER — OFFICE VISIT (OUTPATIENT)
Dept: PHARMACY | Facility: CLINIC | Age: 77
End: 2022-02-02
Payer: COMMERCIAL

## 2022-02-02 VITALS — BODY MASS INDEX: 27.57 KG/M2 | DIASTOLIC BLOOD PRESSURE: 82 MMHG | WEIGHT: 181.3 LBS | SYSTOLIC BLOOD PRESSURE: 136 MMHG

## 2022-02-02 DIAGNOSIS — N40.0 BENIGN NON-NODULAR PROSTATIC HYPERPLASIA WITHOUT LOWER URINARY TRACT SYMPTOMS: ICD-10-CM

## 2022-02-02 DIAGNOSIS — E11.65 TYPE 2 DIABETES MELLITUS WITH HYPERGLYCEMIA, WITHOUT LONG-TERM CURRENT USE OF INSULIN (H): Primary | ICD-10-CM

## 2022-02-02 DIAGNOSIS — E78.5 HYPERLIPIDEMIA LDL GOAL <100: ICD-10-CM

## 2022-02-02 DIAGNOSIS — E11.9 CONTROLLED TYPE 2 DIABETES MELLITUS WITHOUT COMPLICATION, WITHOUT LONG-TERM CURRENT USE OF INSULIN (H): ICD-10-CM

## 2022-02-02 DIAGNOSIS — I10 ESSENTIAL HYPERTENSION WITH GOAL BLOOD PRESSURE LESS THAN 140/90: ICD-10-CM

## 2022-02-02 DIAGNOSIS — H40.003 GLAUCOMA SUSPECT, BILATERAL: ICD-10-CM

## 2022-02-02 DIAGNOSIS — E11.65 TYPE 2 DIABETES MELLITUS WITH HYPERGLYCEMIA, WITHOUT LONG-TERM CURRENT USE OF INSULIN (H): ICD-10-CM

## 2022-02-02 LAB
ANION GAP SERPL CALCULATED.3IONS-SCNC: 1 MMOL/L (ref 3–14)
BUN SERPL-MCNC: 24 MG/DL (ref 7–30)
CALCIUM SERPL-MCNC: 9.4 MG/DL (ref 8.5–10.1)
CHLORIDE BLD-SCNC: 106 MMOL/L (ref 94–109)
CHOLEST SERPL-MCNC: 193 MG/DL
CO2 SERPL-SCNC: 31 MMOL/L (ref 20–32)
CREAT SERPL-MCNC: 1.02 MG/DL (ref 0.66–1.25)
CREAT UR-MCNC: 96 MG/DL
FASTING STATUS PATIENT QL REPORTED: YES
GFR SERPL CREATININE-BSD FRML MDRD: 76 ML/MIN/1.73M2
GLUCOSE BLD-MCNC: 164 MG/DL (ref 70–99)
HBA1C MFR BLD: 7.9 % (ref 0–5.6)
HDLC SERPL-MCNC: 46 MG/DL
LDLC SERPL CALC-MCNC: 122 MG/DL
MICROALBUMIN UR-MCNC: <5 MG/L
MICROALBUMIN/CREAT UR: NORMAL MG/G{CREAT}
NONHDLC SERPL-MCNC: 147 MG/DL
POTASSIUM BLD-SCNC: 4.5 MMOL/L (ref 3.4–5.3)
SODIUM SERPL-SCNC: 138 MMOL/L (ref 133–144)
TRIGL SERPL-MCNC: 123 MG/DL

## 2022-02-02 PROCEDURE — 80048 BASIC METABOLIC PNL TOTAL CA: CPT

## 2022-02-02 PROCEDURE — 99605 MTMS BY PHARM NP 15 MIN: CPT | Performed by: PHARMACIST

## 2022-02-02 PROCEDURE — 83036 HEMOGLOBIN GLYCOSYLATED A1C: CPT

## 2022-02-02 PROCEDURE — 36415 COLL VENOUS BLD VENIPUNCTURE: CPT

## 2022-02-02 PROCEDURE — 82043 UR ALBUMIN QUANTITATIVE: CPT

## 2022-02-02 PROCEDURE — 80061 LIPID PANEL: CPT

## 2022-02-02 PROCEDURE — 99607 MTMS BY PHARM ADDL 15 MIN: CPT | Performed by: PHARMACIST

## 2022-02-02 RX ORDER — TAMSULOSIN HYDROCHLORIDE 0.4 MG/1
0.8 CAPSULE ORAL DAILY
Qty: 180 CAPSULE | Refills: 0 | Status: SHIPPED | OUTPATIENT
Start: 2022-02-02 | End: 2022-04-27

## 2022-02-02 RX ORDER — FINASTERIDE 5 MG/1
5 TABLET, FILM COATED ORAL DAILY
Qty: 90 TABLET | Refills: 0 | Status: SHIPPED | OUTPATIENT
Start: 2022-02-02 | End: 2022-04-27

## 2022-02-02 RX ORDER — METOPROLOL SUCCINATE 25 MG/1
25 TABLET, EXTENDED RELEASE ORAL DAILY
Qty: 90 TABLET | Refills: 0 | Status: SHIPPED | OUTPATIENT
Start: 2022-02-02 | End: 2022-04-27

## 2022-02-02 RX ORDER — PIOGLITAZONEHYDROCHLORIDE 30 MG/1
30 TABLET ORAL DAILY
Qty: 90 TABLET | Refills: 0 | Status: SHIPPED | OUTPATIENT
Start: 2022-02-02 | End: 2022-04-27

## 2022-02-02 RX ORDER — GLIPIZIDE 5 MG/1
5 TABLET ORAL
Qty: 180 TABLET | Refills: 0 | Status: SHIPPED | OUTPATIENT
Start: 2022-02-02 | End: 2022-04-27

## 2022-02-02 RX ORDER — DOXAZOSIN 4 MG/1
2 TABLET ORAL AT BEDTIME
Qty: 90 TABLET | Refills: 0 | Status: SHIPPED | OUTPATIENT
Start: 2022-02-02 | End: 2022-04-27

## 2022-02-02 NOTE — PATIENT INSTRUCTIONS
Recommendations from today's MTM visit:                                                       1. Take metoprolol 25 mg daily.   2. Blood glucose goals: fasting in the morning should be  mg/dL and 2-3 hours after a meal should be less than 180 mg/dL  3. Take glipizide 5 mg twice daily before eating if eating meals higher in sugar/carbohydrates.     Follow-up: Return in about 1 month (around 3/2/2022) for primary care doctor visit.    It was great to speak with you today.  I value your experience and would be very thankful for your time with providing feedback on our clinic survey. You may receive a survey via email or text message in the next few days.     To schedule another MTM appointment, please call the clinic directly or you may call the MTM scheduling line at [phone] or toll-free at [phone].     My Clinical Pharmacist's contact information:                                                      Please feel free to contact me with any questions or concerns you have.      Lisa Celeste, PharmD  Medication Therapy Management Provider, [facility]

## 2022-02-03 NOTE — RESULT ENCOUNTER NOTE
All tests are quite acceptable.  Diabetes is at the top end, but good none for now.  Albert Huggins MD

## 2022-02-04 ENCOUNTER — OFFICE VISIT (OUTPATIENT)
Dept: URGENT CARE | Facility: URGENT CARE | Age: 77
End: 2022-02-04
Payer: MEDICARE

## 2022-02-04 VITALS
DIASTOLIC BLOOD PRESSURE: 56 MMHG | RESPIRATION RATE: 16 BRPM | WEIGHT: 181.5 LBS | TEMPERATURE: 98.2 F | BODY MASS INDEX: 27.6 KG/M2 | OXYGEN SATURATION: 98 % | SYSTOLIC BLOOD PRESSURE: 140 MMHG | HEART RATE: 75 BPM

## 2022-02-04 DIAGNOSIS — R42 VERTIGO: Primary | ICD-10-CM

## 2022-02-04 DIAGNOSIS — T14.8XXA SUPERFICIAL ABRASION: ICD-10-CM

## 2022-02-04 DIAGNOSIS — S09.90XA HEAD INJURY, INITIAL ENCOUNTER: ICD-10-CM

## 2022-02-04 PROCEDURE — 99213 OFFICE O/P EST LOW 20 MIN: CPT | Performed by: FAMILY MEDICINE

## 2022-02-04 NOTE — PROGRESS NOTES
SUBJECTIVE:  Chief Complaint   Patient presents with     Dizziness     Fall     Kushal Bush is a 76 year old male presents with a chief complaint of vertigo and concerns for concussion due to head injury.    Patient was sleeping and got up and suddenly developed spinning sensation, was getting up to go to bathroom around 3 am.  When patient stood up, ended up falling down as he was having vertigo, hit the back of his head and wrenched his back.  Went back into bed and waited for about 30 minutes and was able to get up slowly and then went to bathroom.  Still has some vertigo but waited and did subside after 20-30 seconds.     This was yesterday.  Patient denies any LOC.  Sustained a few small bumps on back of head, has scrapes on right back shoulder and right fore arm scraped.  Patient denies any headache.  Rested and slept for most of yesterday and did take it easy today.  Patient had applied topical antibiotic ointment to superficial abrasion.    Patient denies any prior history of vertigo.  Has hearing aid in right ear, no changes.  Due for eye exam but last time was unremarkable, had cataract surgery.  Did some reading yesterday and did notice vision changes briefly but resolved.    Had increase cardura to 4 mg over the past couple of days as his BP tends to run on the higher end and his primary had wanted him to increase it but had not started until recently.  In addition, did try to drink more fluids for the morning of 2/2 for blood draw.    Past Medical History:   Diagnosis Date     Asthma      Benign non-nodular prostatic hyperplasia without lower urinary tract symptoms 7/18/2016     BPH (benign prostatic hyperplasia) 9/14/2011     Campylobacter diarrhea 7/18/2016     CMC arthritis, thumb, degenerative 11/25/2013     Diabetes type 2, uncontrolled (H) 7/18/2016     Essential hypertension with goal blood pressure less than 140/90 7/18/2016     Fatigue, unspecified type 7/18/2016     Heart palpitations  9/14/2011     HTN (hypertension) 9/14/2011     HTN, goal below 140/90 1/4/2012     Hyperglycemia 10/27/2011     Hyperlipidemia LDL goal <100 10/27/2011     Impacted cerumen 9/14/2011     Intermittent asthma 9/14/2011     Lumbar radiculopathy 11/7/2011     Mumps      Palpitations      Post-nasal drainage 9/14/2011     Presbycusis 9/14/2011     Seborrheic keratoses 9/14/2011     Tubular adenoma of colon      Type 2 diabetes mellitus without complications (H) 10/12/2015     Type 2 diabetes, HbA1c goal < 7% (H) 1/6/2012     Type II diabetes mellitus with HbA1C goal between 7 and 8 6/18/2013     Uncontrolled type 2 diabetes mellitus without complication, without long-term current use of insulin 12/27/2016     Current Outpatient Medications   Medication Sig Dispense Refill     blood glucose (NO BRAND SPECIFIED) test strip Use to test blood sugar 2 times daily or as directed. To accompany: Blood Glucose Monitor Brands: per insurance. 100 strip 11     doxazosin (CARDURA) 4 MG tablet Take 0.5 tablets (2 mg) by mouth At Bedtime 90 tablet 0     finasteride (PROSCAR) 5 MG tablet Take 1 tablet (5 mg) by mouth daily 90 tablet 0     glipiZIDE (GLUCOTROL) 5 MG tablet Take 1 tablet (5 mg) by mouth 2 times daily (before meals) 180 tablet 0     latanoprost (XALATAN) 0.005 % ophthalmic solution Place 1 drop into both eyes daily       metoprolol succinate ER (TOPROL-XL) 25 MG 24 hr tablet Take 1 tablet (25 mg) by mouth daily 90 tablet 0     pioglitazone (ACTOS) 30 MG tablet Take 1 tablet (30 mg) by mouth daily 90 tablet 0     red yeast rice 600 MG CAPS Take 600 mg by mouth daily       STATIN NOT PRESCRIBED (INTENTIONAL) Patient declined .       tamsulosin (FLOMAX) 0.4 MG capsule Take 2 capsules (0.8 mg) by mouth daily 180 capsule 0     timolol maleate (TIMOPTIC) 0.5 % ophthalmic solution PLACE 1 DROP BOTH EYES ONCE DAILY       Social History     Tobacco Use     Smoking status: Never Smoker     Smokeless tobacco: Never Used     Tobacco  comment: no real history   Substance Use Topics     Alcohol use: Yes     Comment: Hardly ever...don't enjoy alcohol.       ROS:  Review of systems negative except as stated above.    EXAM:   BP (!) 140/56 (BP Location: Right arm, Patient Position: Chair, Cuff Size: Adult Regular)   Pulse 75   Temp 98.2  F (36.8  C) (Oral)   Resp 16   Wt 82.3 kg (181 lb 8 oz)   SpO2 98%   BMI 27.60 kg/m    Gen: healthy,alert,no distress  HEAD: faint linear abrasion wound on occiput with 2 small residual hematoma, non-tender  EARS: right ear canal with cerumen impaction, left ear canal with moderate cerumen  EXTREMITIES: peripheral pulses normal, minimal abrasion wounds on right forearm  SKIN: right upper back - superficial abrasion with faint pink, no drainage, faint scabbing  PSYCH: alert, affect bright      ASSESSMENT/PLAN:   (R42) Vertigo  (primary encounter diagnosis)  Comment: improving  Plan: monitor    (S09.90XA) Head injury, initial encounter  Comment: s/p fall injury  Plan: brain rest, fluids    (T14.8XXA) Superficial abrasion  Comment: s/p fall  Plan: topical antibiotic ointment, monitor      Patient appears well, no acute distress and is now over 24 hours from initial fall/head injury.  Okay for tylenol and aleve for back pain due to fall injury.  Reviewed head injury and concussion symptoms and importance of brain rest with decrease visual stimulation.  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    Discussed vertigo and to minimize head position changes that can trigger this.  Offered meclizine but declined as vertigo symptoms mild and improving.  Encourage plenty of fluids and to rest and to be cautious when changing position.  Unclear if increase in cardura at bedtime caused symptoms, recommend to go back down to 1/2 tablet at this time instead of 1 tablet.    Follow up with primary provider if no improvement of  symptoms in 1 week    Shant Biswas MD  February 4, 2022 4:40 PM

## 2022-02-04 NOTE — PATIENT INSTRUCTIONS
Patient Education     Inner Ear Problems: Causes of Dizziness (Vertigo)        Benign positional vertigo (BPV)   This is the most common cause of vertigo. BPV is also called benign positional paroxysmal vertigo (BPPV). It happens when crystals in the ear canals shift into the wrong place. Vertigo usually occurs when you move your head in a certain way. This can happen when turning in bed, bending, or looking up. Because BPV comes on quickly, you should think about if you are safe to drive or do other tasks that need your full attention.   BPV:    Causes vertigo that lasts for seconds. Vertigo can occur several times a day, depending on body position.    Doesn t cause hearing loss.    Often goes away on its own. But it may go away sooner with treatment.  Infection or inflammation  Sometimes the semicircular canals swell and send incorrect balance signals. This problem may be caused by a viral infection. Depending on the cause, your hearing can be affected (labyrinthitis). Or your hearing can remain normal (neuronitis).   Infection or inflammation:    Causes vertigo that lasts for hours or days. The first episode is usually the worst.    Can cause hearing loss.    Often goes away on its own. But it may go away sooner with treatment.  You may need vestibular rehabilitation if you have balance problems that don't go away.   Meniere s disease  This condition is uncommon. It happens when there is too much fluid in the ear canals. This causes increased pressure and swelling. It affects balance and hearing signals.   Meniere s disease may:    Cause vertigo that last for hours    Cause hearing problems that come and go. The problems are usually in one ear and get worse over time.    Cause buzzing or ringing in the ears (tinnitus)    Cause a feeling of fullness or pressure in the ear    Cause any of these lasting symptoms: vertigo, hearing loss, tinnitus, or ear fullness  Other causes of vertigo  Vertigo can also be caused  by:     A head injury    Certain medicines    Migraines    Brain problems, such as a stroke or bleeding in the brain    [employer] last reviewed this educational content on 2/1/2020 2000-2021 [employer]. All rights reserved. This information is not intended as a substitute for professional medical care. Always follow your healthcare professional's instructions.           Patient Education     * Head Injury, no wake-up (Adult)    You have a head injury. It doesn t look serious right now. But symptoms of a more serious problem may appear later. This could be a mild brain injury (concussion), or bruising or bleeding in the brain. You or someone caring for you will need to watch for the symptoms listed below for at least the next 24 hours. When you get home, be sure to follow any care instructions you re given.  Home care  Watch for the following symptoms:  Call 9-1-1 if you have any of these symptoms over the next hours or days:  1. Severe headache or headache that gets worse  2. Nausea and repeated throwing up (vomiting)  3. Dizziness or changes in eyesight (vision changes)  4. Bothered by bright light or loud noise  5. Sleep changes (trouble falling asleep or unusually sleepy or groggy)  6. Changes in the way you act or talk (personality or speech changes)  7. Feeling confused or forgetting things (memory loss)  8. Trouble walking or clumsiness  9. Passing out or fainting (even for a short time)  10. Won t wake up  11. Stiff neck  12. Weakness or numbness in any part of the body  13. Seizures  Do you have signs of a concussion?  A concussion is an injury to the brain caused by shaking. If you were knocked out, that s a sign you may have a concussion. But watch for these signs, too:    Upset stomach (nausea)    Throwing up (vomiting)    Feeling dizzy or confused    Headache    Loss of memory  If you have any of the above signs:    Don t return to sports or any activity where you might hurt your head  again.    Wait until all symptoms have been gone for 2 full weeks, and your doctor has cleared you to return to sports.  You could get a serious brain injury if you get hurt again before fully recovering.  General care    You don t need to stay awake or be woken during the night.    For pain, you may use:  ? Tylenol (acetaminophen) 650 to 1000 mg every 6 hours OR  ? Motrin or Advil (ibuprofen) 600 mg every 6 to 8 hours with food  NOTE: If you have chronic liver or kidney disease or ever had a stomach ulcer or GI bleeding, talk with your doctor before using these medicines.    Don t take aspirin after a head injury.    For swelling and to help with pain: Put a cold source to the injured area for up to 20 minutes at a time. Do this as often as directed. Use a cold pack or bag of ice wrapped in a thin towel. Never put a cold source directly on the skin.    For cuts and scrapes: Care for them as the doctor or nurse directed.    For the next 24 hours (or longer, if your doctor advises it):  ? Don t drink alcohol, use sedatives, or use other medicines that make you sleepy.  ? Don t drive or use large machines.  ? Don t do anything tiring, such as heavy lifting or straining.  ? Limit tasks where you need to focus or concentrate. This includes reading, using a smartphone or computer, watching TV and playing video games.  ? Don t return to sports or other activities that could cause another head injury.  Follow-up care  Follow up with your doctor if symptoms don t get better after 24 hours, or as directed.  When to call the doctor  Call your doctor right away if any of these occur:    Pain doesn t get better or gets worse    New or increased swelling or bruising    Fever of 100.4 F (38 C) or higher, or as directed by your doctor    Increased redness, warmth, draining or bleeding from the injury    Fluid drainage or bleeding from the nose or ears    Any pushed-in spot or bony bump in the injured area  This information has  been modified by your health care provider with permission from the publisher.  Modifications clinically reviewed by Rivera Benton DO, MBA, FACOEP, Director of Physician Informatics for Emergency Medicine, [facility] on 8/20/18.  For informational purposes only. Not to replace the advice of your health care provider.  Copyright   2018 [facility]. All rights reserved.           Patient Education     Abrasions  Abrasions are skin scrapes. Their treatment depends on how large and deep the abrasion is.   Home care  You may be prescribed an antibiotic cream or ointment to apply to the wound. This helps prevent infection. Follow instructions when using this medicine.   General care    To care for the abrasion, do the following each day for as long as directed by your healthcare provider:  ? If you were given a bandage, change it once a day. If your bandage sticks to the wound, soak it in warm water until it loosens.  ? Wash the area with soap and warm water. You may do this in a sink or under a tub faucet or shower. Rinse off the soap. Then pat the area dry with a clean towel.  ? If antibiotic ointment or cream was prescribed, reapply it to the wound as directed. Cover the wound with a fresh nonstick bandage. If the bandage becomes wet or dirty, change it as soon as possible.  ? Some antibiotic ointments or cream can cause an allergic reaction or dermatitis. This may cause redness, itching and or hives. If this occurs, stop using the ointment right away and wash off any remaining ointment. You may need to take some allergy medicine to relieve symptoms.    You may use acetaminophen or ibuprofen to control pain unless another pain medicine was prescribed. Talk with your healthcare provider before using these medicines if you have chronic liver or kidney disease or ever had a stomach ulcer or GI (gastrointestinal) bleeding. Don t use ibuprofen in children younger than 6 months old.    Most skin  wounds heal within 10 days. But an infection may occur even with treatment. So it s important to watch the wound for signs of infection as listed below.    Follow-up care  Follow up with your healthcare provider, or as advised.  When to get medical advice  Call your healthcare provider right away if any of these occur:    Fever of 100.4 F (38 C) or higher, or as directed by your healthcare provider    Increasing pain, redness, swelling, or drainage from the wound    Bleeding from the wound that does not stop after a few minutes of steady, firm pressure    Decreased ability to move any body part near the wound  [employer] last reviewed this educational content on 8/1/2019 2000-2021 [employer]. All rights reserved. This information is not intended as a substitute for professional medical care. Always follow your healthcare professional's instructions.

## 2022-04-11 ENCOUNTER — MYC MEDICAL ADVICE (OUTPATIENT)
Dept: FAMILY MEDICINE | Facility: CLINIC | Age: 77
End: 2022-04-11
Payer: MEDICARE

## 2022-04-11 ENCOUNTER — E-VISIT (OUTPATIENT)
Dept: FAMILY MEDICINE | Facility: CLINIC | Age: 77
End: 2022-04-11
Payer: MEDICARE

## 2022-04-11 DIAGNOSIS — F41.8 PERFORMANCE ANXIETY: Primary | ICD-10-CM

## 2022-04-11 PROCEDURE — 99207 PR NON-BILLABLE SERV PER CHARTING: CPT | Performed by: PHYSICIAN ASSISTANT

## 2022-04-11 NOTE — PATIENT INSTRUCTIONS
Thank you for choosing us for your care. I think an in-clinic or virtual visit would be best next steps based on your symptoms. Please schedule a clinic appointment; you won t be charged for this eVisit.  I will have someone call you to schedule a virtual visit so that we can discuss the medications and side effects.    You can schedule an appointment right here in [facility], or call [phone]

## 2022-04-25 ENCOUNTER — MYC MEDICAL ADVICE (OUTPATIENT)
Dept: FAMILY MEDICINE | Facility: CLINIC | Age: 77
End: 2022-04-25
Payer: MEDICARE

## 2022-05-05 ENCOUNTER — E-VISIT (OUTPATIENT)
Dept: URGENT CARE | Facility: CLINIC | Age: 77
End: 2022-05-05
Payer: MEDICARE

## 2022-05-05 DIAGNOSIS — U07.1 INFECTION DUE TO 2019 NOVEL CORONAVIRUS: Primary | ICD-10-CM

## 2022-05-05 PROCEDURE — 99207 PR NO BILLABLE SERVICE THIS VISIT: CPT | Performed by: FAMILY MEDICINE

## 2022-06-14 ENCOUNTER — OFFICE VISIT (OUTPATIENT)
Dept: FAMILY MEDICINE | Facility: CLINIC | Age: 77
End: 2022-06-14
Payer: MEDICARE

## 2022-06-14 VITALS
BODY MASS INDEX: 26.53 KG/M2 | OXYGEN SATURATION: 96 % | TEMPERATURE: 98.2 F | SYSTOLIC BLOOD PRESSURE: 128 MMHG | RESPIRATION RATE: 14 BRPM | WEIGHT: 169 LBS | HEART RATE: 59 BPM | DIASTOLIC BLOOD PRESSURE: 76 MMHG | HEIGHT: 67 IN

## 2022-06-14 DIAGNOSIS — E11.65 TYPE 2 DIABETES MELLITUS WITH HYPERGLYCEMIA, WITHOUT LONG-TERM CURRENT USE OF INSULIN (H): ICD-10-CM

## 2022-06-14 DIAGNOSIS — R06.83 SNORING: ICD-10-CM

## 2022-06-14 DIAGNOSIS — Z00.00 WELLNESS EXAMINATION: Primary | ICD-10-CM

## 2022-06-14 DIAGNOSIS — Z23 ENCOUNTER FOR IMMUNIZATION: ICD-10-CM

## 2022-06-14 DIAGNOSIS — R53.83 FATIGUE, UNSPECIFIED TYPE: ICD-10-CM

## 2022-06-14 DIAGNOSIS — M25.552 PAIN IN JOINT INVOLVING PELVIC REGION AND THIGH, LEFT: ICD-10-CM

## 2022-06-14 LAB
ALBUMIN SERPL-MCNC: 3.7 G/DL (ref 3.4–5)
ALP SERPL-CCNC: 88 U/L (ref 40–150)
ALT SERPL W P-5'-P-CCNC: 38 U/L (ref 0–70)
ANION GAP SERPL CALCULATED.3IONS-SCNC: 6 MMOL/L (ref 3–14)
AST SERPL W P-5'-P-CCNC: 21 U/L (ref 0–45)
BASOPHILS # BLD AUTO: 0 10E3/UL (ref 0–0.2)
BASOPHILS NFR BLD AUTO: 1 %
BILIRUB SERPL-MCNC: 0.5 MG/DL (ref 0.2–1.3)
BUN SERPL-MCNC: 24 MG/DL (ref 7–30)
CALCIUM SERPL-MCNC: 9.4 MG/DL (ref 8.5–10.1)
CHLORIDE BLD-SCNC: 105 MMOL/L (ref 94–109)
CO2 SERPL-SCNC: 26 MMOL/L (ref 20–32)
CREAT SERPL-MCNC: 0.92 MG/DL (ref 0.66–1.25)
EOSINOPHIL # BLD AUTO: 0.2 10E3/UL (ref 0–0.7)
EOSINOPHIL NFR BLD AUTO: 3 %
ERYTHROCYTE [DISTWIDTH] IN BLOOD BY AUTOMATED COUNT: 13 % (ref 10–15)
GFR SERPL CREATININE-BSD FRML MDRD: 86 ML/MIN/1.73M2
GLUCOSE BLD-MCNC: 253 MG/DL (ref 70–99)
HBA1C MFR BLD: 9.4 % (ref 0–5.6)
HCT VFR BLD AUTO: 45.6 % (ref 40–53)
HGB BLD-MCNC: 15.4 G/DL (ref 13.3–17.7)
LYMPHOCYTES # BLD AUTO: 1.4 10E3/UL (ref 0.8–5.3)
LYMPHOCYTES NFR BLD AUTO: 27 %
MCH RBC QN AUTO: 30.4 PG (ref 26.5–33)
MCHC RBC AUTO-ENTMCNC: 33.8 G/DL (ref 31.5–36.5)
MCV RBC AUTO: 90 FL (ref 78–100)
MONOCYTES # BLD AUTO: 0.4 10E3/UL (ref 0–1.3)
MONOCYTES NFR BLD AUTO: 9 %
NEUTROPHILS # BLD AUTO: 3.1 10E3/UL (ref 1.6–8.3)
NEUTROPHILS NFR BLD AUTO: 60 %
PLATELET # BLD AUTO: 239 10E3/UL (ref 150–450)
POTASSIUM BLD-SCNC: 4.9 MMOL/L (ref 3.4–5.3)
PROT SERPL-MCNC: 7.5 G/DL (ref 6.8–8.8)
RBC # BLD AUTO: 5.07 10E6/UL (ref 4.4–5.9)
SODIUM SERPL-SCNC: 137 MMOL/L (ref 133–144)
TSH SERPL DL<=0.005 MIU/L-ACNC: 2.43 MU/L (ref 0.4–4)
WBC # BLD AUTO: 5.2 10E3/UL (ref 4–11)

## 2022-06-14 PROCEDURE — 99214 OFFICE O/P EST MOD 30 MIN: CPT | Mod: 25 | Performed by: FAMILY MEDICINE

## 2022-06-14 PROCEDURE — 0054A COVID-19,PF,PFIZER (12+ YRS): CPT | Performed by: FAMILY MEDICINE

## 2022-06-14 PROCEDURE — 80050 GENERAL HEALTH PANEL: CPT | Performed by: FAMILY MEDICINE

## 2022-06-14 PROCEDURE — G0439 PPPS, SUBSEQ VISIT: HCPCS | Performed by: FAMILY MEDICINE

## 2022-06-14 PROCEDURE — 36415 COLL VENOUS BLD VENIPUNCTURE: CPT | Performed by: FAMILY MEDICINE

## 2022-06-14 PROCEDURE — 83036 HEMOGLOBIN GLYCOSYLATED A1C: CPT | Performed by: FAMILY MEDICINE

## 2022-06-14 PROCEDURE — 91305 COVID-19,PF,PFIZER (12+ YRS): CPT | Performed by: FAMILY MEDICINE

## 2022-06-14 SDOH — HEALTH STABILITY: PHYSICAL HEALTH: ON AVERAGE, HOW MANY MINUTES DO YOU ENGAGE IN EXERCISE AT THIS LEVEL?: 60 MIN

## 2022-06-14 SDOH — ECONOMIC STABILITY: INCOME INSECURITY: HOW HARD IS IT FOR YOU TO PAY FOR THE VERY BASICS LIKE FOOD, HOUSING, MEDICAL CARE, AND HEATING?: NOT VERY HARD

## 2022-06-14 SDOH — ECONOMIC STABILITY: INCOME INSECURITY: IN THE LAST 12 MONTHS, WAS THERE A TIME WHEN YOU WERE NOT ABLE TO PAY THE MORTGAGE OR RENT ON TIME?: NO

## 2022-06-14 SDOH — ECONOMIC STABILITY: TRANSPORTATION INSECURITY
IN THE PAST 12 MONTHS, HAS THE LACK OF TRANSPORTATION KEPT YOU FROM MEDICAL APPOINTMENTS OR FROM GETTING MEDICATIONS?: NO

## 2022-06-14 SDOH — ECONOMIC STABILITY: FOOD INSECURITY: WITHIN THE PAST 12 MONTHS, YOU WORRIED THAT YOUR FOOD WOULD RUN OUT BEFORE YOU GOT MONEY TO BUY MORE.: NEVER TRUE

## 2022-06-14 SDOH — ECONOMIC STABILITY: FOOD INSECURITY: WITHIN THE PAST 12 MONTHS, THE FOOD YOU BOUGHT JUST DIDN'T LAST AND YOU DIDN'T HAVE MONEY TO GET MORE.: NEVER TRUE

## 2022-06-14 SDOH — ECONOMIC STABILITY: TRANSPORTATION INSECURITY
IN THE PAST 12 MONTHS, HAS LACK OF TRANSPORTATION KEPT YOU FROM MEETINGS, WORK, OR FROM GETTING THINGS NEEDED FOR DAILY LIVING?: NO

## 2022-06-14 SDOH — HEALTH STABILITY: PHYSICAL HEALTH: ON AVERAGE, HOW MANY DAYS PER WEEK DO YOU ENGAGE IN MODERATE TO STRENUOUS EXERCISE (LIKE A BRISK WALK)?: 4 DAYS

## 2022-06-14 ASSESSMENT — ENCOUNTER SYMPTOMS
SHORTNESS OF BREATH: 0
FREQUENCY: 1
MYALGIAS: 1
DIARRHEA: 0
SORE THROAT: 0
CONSTIPATION: 0
HEADACHES: 0
ARTHRALGIAS: 0
JOINT SWELLING: 0
FEVER: 0
PARESTHESIAS: 0
EYE PAIN: 0
NAUSEA: 0
COUGH: 0
CHILLS: 0
DIZZINESS: 0
DYSURIA: 0
ABDOMINAL PAIN: 0
HEMATOCHEZIA: 0
HEARTBURN: 0
NERVOUS/ANXIOUS: 0
PALPITATIONS: 0
HEMATURIA: 0
WEAKNESS: 0

## 2022-06-14 ASSESSMENT — LIFESTYLE VARIABLES
HOW OFTEN DO YOU HAVE A DRINK CONTAINING ALCOHOL: NEVER
SKIP TO QUESTIONS 9-10: 1
HOW MANY STANDARD DRINKS CONTAINING ALCOHOL DO YOU HAVE ON A TYPICAL DAY: PATIENT DOES NOT DRINK
AUDIT-C TOTAL SCORE: 0
HOW OFTEN DO YOU HAVE SIX OR MORE DRINKS ON ONE OCCASION: NEVER

## 2022-06-14 ASSESSMENT — SOCIAL DETERMINANTS OF HEALTH (SDOH)
HOW OFTEN DO YOU ATTEND CHURCH OR RELIGIOUS SERVICES?: MORE THAN 4 TIMES PER YEAR
IN A TYPICAL WEEK, HOW MANY TIMES DO YOU TALK ON THE PHONE WITH FAMILY, FRIENDS, OR NEIGHBORS?: MORE THAN THREE TIMES A WEEK
DO YOU BELONG TO ANY CLUBS OR ORGANIZATIONS SUCH AS CHURCH GROUPS UNIONS, FRATERNAL OR ATHLETIC GROUPS, OR SCHOOL GROUPS?: YES
HOW OFTEN DO YOU GET TOGETHER WITH FRIENDS OR RELATIVES?: ONCE A WEEK

## 2022-06-14 ASSESSMENT — ACTIVITIES OF DAILY LIVING (ADL): CURRENT_FUNCTION: NO ASSISTANCE NEEDED

## 2022-06-14 NOTE — PROGRESS NOTES
"SUBJECTIVE:   Kushal Bush is a 76 year old male who presents for Preventive Visit.      Patient has been advised of split billing requirements and indicates understanding: Yes  Are you in the first 12 months of your Medicare coverage?  No    Healthy Habits:     In general, how would you rate your overall health?  Good    Frequency of exercise:  4-5 days/week    Duration of exercise:  45-60 minutes    Do you usually eat at least 4 servings of fruit and vegetables a day, include whole grains    & fiber and avoid regularly eating high fat or \"junk\" foods?  No    Taking medications regularly:  Yes    Medication side effects:  None    Ability to successfully perform activities of daily living:  No assistance needed    Home Safety:  No safety concerns identified    Hearing Impairment:  Difficulty following a conversation in a noisy restaurant or crowded room, difficulty following dialogue in the theater and difficulty understanding soft or whispered speech    In the past 6 months, have you been bothered by leaking of urine? Yes    In general, how would you rate your overall mental or emotional health?  Excellent      PHQ-2 Total Score: 0    Additional concerns today:  No    Do you feel safe in your environment? Yes    Have you ever done Advance Care Planning? (For example, a Health Directive, POLST, or a discussion with a medical provider or your loved ones about your wishes): Yes, advance care planning is on file.       Fall risk  Fallen 2 or more times in the past year?: No  Any fall with injury in the past year?: Yes    Cognitive Screening   1) Repeat 3 items (Leader, Season, Table)    2) Clock draw: NORMAL  3) 3 item recall: Recalls 3 objects  Results: 3 items recalled: COGNITIVE IMPAIRMENT LESS LIKELY    Mini-CogTM Copyright NICOLAS Tom. Licensed by the author for use in [facility]; reprinted with permission (radha@.[facility]). All rights reserved.      Do you have sleep apnea, excessive snoring or daytime " drowsiness?: yes    Reviewed and updated as needed this visit by clinical staff   Tobacco  Allergies  Meds   Med Hx  Surg Hx  Fam Hx  Soc Hx          Reviewed and updated as needed this visit by Provider                   Social History     Tobacco Use     Smoking status: Never Smoker     Smokeless tobacco: Never Used     Tobacco comment: no real history   Substance Use Topics     Alcohol use: Yes     Comment: Hardly ever...don't enjoy alcohol.     If you drink alcohol do you typically have >3 drinks per day or >7 drinks per week? No    Alcohol Use 6/14/2022   Prescreen: >3 drinks/day or >7 drinks/week? Not Applicable   Prescreen: >3 drinks/day or >7 drinks/week? -               Current providers sharing in care for this patient include:    Patient Care Team:  Albert Huggins MD as PCP - General (Family Practice)  Albert Huggins MD as Assigned PCP  Dejuan Felix MD as MD (Urology)  Albert Huggins MD as Referring Physician (Family Practice)    The following health maintenance items are reviewed in Epic and correct as of today:  Health Maintenance Due   Topic Date Due     ZOSTER IMMUNIZATION (2 of 3) 06/11/2012     EYE EXAM  01/25/2022     DIABETIC FOOT EXAM  02/09/2022     COVID-19 Vaccine (4 - Booster for Pfizer series) 02/12/2022               Review of Systems   Constitutional: Negative for chills and fever.   HENT: Positive for hearing loss. Negative for congestion, ear pain and sore throat.    Eyes: Negative for pain and visual disturbance.   Respiratory: Negative for cough and shortness of breath.    Cardiovascular: Negative for chest pain, palpitations and peripheral edema.   Gastrointestinal: Negative for abdominal pain, constipation, diarrhea, heartburn, hematochezia and nausea.   Genitourinary: Positive for frequency and urgency. Negative for dysuria, genital sores, hematuria, impotence and penile discharge.   Musculoskeletal: Positive for myalgias. Negative for arthralgias and joint  "swelling.   Skin: Negative for rash.   Neurological: Negative for dizziness, weakness, headaches and paresthesias.   Psychiatric/Behavioral: Negative for mood changes. The patient is not nervous/anxious.          OBJECTIVE:   /76 (BP Location: Right arm, Patient Position: Sitting, Cuff Size: Adult Regular)   Pulse 59   Temp 98.2  F (36.8  C) (Oral)   Resp 14   Ht 1.689 m (5' 6.5\")   Wt 76.7 kg (169 lb)   SpO2 96%   BMI 26.87 kg/m   Estimated body mass index is 26.87 kg/m  as calculated from the following:    Height as of this encounter: 1.689 m (5' 6.5\").    Weight as of this encounter: 76.7 kg (169 lb).  Physical Exam  Vitals and nursing note reviewed.   Constitutional:       General: He is not in acute distress.     Appearance: Normal appearance. He is not diaphoretic.   HENT:      Head: Normocephalic and atraumatic.      Right Ear: Tympanic membrane and external ear normal.      Left Ear: Tympanic membrane and external ear normal.      Nose: Nose normal.      Mouth/Throat:      Mouth: Mucous membranes are moist.      Pharynx: No oropharyngeal exudate.   Eyes:      Pupils: Pupils are equal, round, and reactive to light.   Cardiovascular:      Rate and Rhythm: Normal rate and regular rhythm.      Heart sounds: Normal heart sounds.   Pulmonary:      Effort: Pulmonary effort is normal.      Breath sounds: Normal breath sounds.   Abdominal:      General: Abdomen is flat. Bowel sounds are normal.   Musculoskeletal:      Cervical back: Neck supple.      Comments: Negative straight leg raise   Lymphadenopathy:      Cervical: No cervical adenopathy.   Skin:     General: Skin is warm and dry.   Neurological:      General: No focal deficit present.      Mental Status: He is alert.   Psychiatric:         Mood and Affect: Mood normal.               ASSESSMENT / PLAN:     Problem List Items Addressed This Visit     Encounter for immunization     Offered           Relevant Orders    COVID-19,JACINDA,PFIZER (12+ Yrs GRAY " "LABEL) (Completed)    Fatigue, unspecified type     He has less energy than he thinks he should snort and regular nocturnal awakening.  Discussed differential.  Serologic evaluation, refer           Relevant Orders    Adult Sleep Eval & Management  Referral    TSH with free T4 reflex (Completed)    CBC with platelets and differential (Completed)    Pain in joint involving pelvic region and thigh, left     Upon arising he gets a brief discomfort in his anterior thighs bilaterally resolves in seconds, no other times.  Discussed evaluation.  Rule out evaluate           Snoring     His wife notes vigorous snoring.  Refer           Relevant Orders    Adult Sleep Eval & Management  Referral    Type 2 diabetes mellitus with hyperglycemia, without long-term current use of insulin (H)     Update assessment           Relevant Orders    Hemoglobin A1c (Completed)    Comprehensive metabolic panel (BMP + Alb, Alk Phos, ALT, AST, Total. Bili, TP) (Completed)      Other Visit Diagnoses     Wellness examination    -  Primary          Patient has been advised of split billing requirements and indicates understanding: Yes    COUNSELING:  Reviewed preventive health counseling, as reflected in patient instructions    Estimated body mass index is 26.87 kg/m  as calculated from the following:    Height as of this encounter: 1.689 m (5' 6.5\").    Weight as of this encounter: 76.7 kg (169 lb).        He reports that he has never smoked. He has never used smokeless tobacco.      Appropriate preventive services were discussed with this patient, including applicable screening as appropriate for cardiovascular disease, diabetes, osteopenia/osteoporosis, and glaucoma.  As appropriate for age/gender, discussed screening for colorectal cancer, prostate cancer, breast cancer, and cervical cancer. Checklist reviewing preventive services available has been given to the patient.    Reviewed patients plan of care and provided an AVS. " The Basic Care Plan (routine screening as documented in Health Maintenance) for Edward meets the Care Plan requirement. This Care Plan has been established and reviewed with the Patient.    Counseling Resources:  ATP IV Guidelines  Pooled Cohorts Equation Calculator  Breast Cancer Risk Calculator  Breast Cancer: Medication to Reduce Risk  FRAX Risk Assessment  ICSI Preventive Guidelines  Dietary Guidelines for Americans, 2010  USDA's MyPlate  ASA Prophylaxis  Lung CA Screening    Albert Huggins MD  [facility]    Identified Health Risks:

## 2022-06-15 DIAGNOSIS — E11.9 CONTROLLED TYPE 2 DIABETES MELLITUS WITHOUT COMPLICATION, WITHOUT LONG-TERM CURRENT USE OF INSULIN (H): ICD-10-CM

## 2022-06-15 RX ORDER — GLIPIZIDE 10 MG/1
10 TABLET ORAL
Qty: 180 TABLET | Refills: 1 | Status: SHIPPED | OUTPATIENT
Start: 2022-06-15 | End: 2023-01-12

## 2022-06-15 RX ORDER — PIOGLITAZONEHYDROCHLORIDE 45 MG/1
45 TABLET ORAL DAILY
Qty: 90 TABLET | Refills: 3 | Status: SHIPPED | OUTPATIENT
Start: 2022-06-15 | End: 2023-01-12

## 2022-06-15 NOTE — RESULT ENCOUNTER NOTE
Looks pretty good, except for the diabetes. You will need more medicine. I have increased the glipizide and the actos. I sent it to the pharmacy. Let's check lab again in 3 months  Albert Huggins MD

## 2022-06-15 NOTE — ASSESSMENT & PLAN NOTE
He has less energy than he thinks he should snort and regular nocturnal awakening.  Discussed differential.  Serologic evaluation, refer

## 2022-06-15 NOTE — ASSESSMENT & PLAN NOTE
Upon arising he gets a brief discomfort in his anterior thighs bilaterally resolves in seconds, no other times.  Discussed evaluation.  Rule out evaluate

## 2022-08-10 ENCOUNTER — TRANSFERRED RECORDS (OUTPATIENT)
Dept: SLEEP MEDICINE | Facility: CLINIC | Age: 77
End: 2022-08-10

## 2022-08-10 LAB — RETINOPATHY: NEGATIVE

## 2022-08-11 ENCOUNTER — TELEPHONE (OUTPATIENT)
Dept: FAMILY MEDICINE | Facility: CLINIC | Age: 77
End: 2022-08-11

## 2022-08-11 NOTE — TELEPHONE ENCOUNTER
Patient Quality Outreach    Patient is due for the following:   Diabetes -  Eye Exam    Next Steps:   No follow up needed at this time.    Type of outreach:    Pt had a diabetic eye exam at [facility] on 8/10/22      Questions for provider review:    None     Debora Aguiar, [facility]

## 2022-09-03 ENCOUNTER — HEALTH MAINTENANCE LETTER (OUTPATIENT)
Age: 77
End: 2022-09-03

## 2023-01-09 NOTE — PROGRESS NOTES
Medication Therapy Management (MTM) Encounter    ASSESSMENT:                            Medication Adherence/Access: He has not been taking glipizide or metoprolol as prescribed. Also self stopped finasteride. He can be non-adherent to medications especially when traveling.     Type 2 Diabetes:  Patient is not meeting A1c goal of <8%. He often travels to [place] where it is harder for him to consistently take his medications. Has not been checking blood glucose at home recently. Recommend checking blood glucose readings at home and re-starting glipizide. Discussed importance of regularly taking medications and checking blood glucose more frequently. Next visit may need to consider addition of GLP-1 agonist or SGLT-2 inhibitor through assistance programs as his insurance does not cover - he is unwilling to start this today.    Hypertension: Patient would benefit from the following changes - increasing the dose of metoprolol as prescribed to 25 mg daily as his blood pressure runs borderline high above his goal of <140/90.    Hyperlipidemia: Patient is not on moderate intensity statin which is indicated based on 2019 ACC/AHA guidelines for lipid management.  He declines statin therapy. Continue with healthy diet and exercise. Rechecking lipid panel today.    BPH: Will refill both tamsulosin and doxazosin today. However, due to similar mechanism of actions and patient unsure of benefit, will see if patient is able to tolerate just doxazosin without symptoms of BPH.        Immunizations: Due for both shingles vaccinations and tetanus vaccination.    Glaucoma: stable     PLAN:                            1. Restart glipizide 10 mg daily before a meal for 1 week then increase to twice daily before meals.  2. Increase metoprolol ER to 25 mg daily (full tablet).  3. Blood glucose goals: fasting in the morning should be  mg/dL and 2-3 hours after a meal should be less than 180 mg/dL  4. Try stopping tamsulosin and only  taking doxazosin - see if any changes in your symptoms. If none, stay off tamsulosin. If worsened symptoms, go back on tamsulosin 0.4 mg daily.   5. Get your Shingrix and tetanus booster at the pharmacy.     Follow-up: Return in 3 months to recheck A1c    SUBJECTIVE/OBJECTIVE:                          Kushal Bush is a 77 year old male coming in for a follow-up visit.  Today's visit is a follow-up MTM visit from 2/2.     Reason for visit: Lab recheck.    Allergies/ADRs: Reviewed in chart  Past Medical History: Reviewed in chart  Tobacco: He reports that he has never smoked. He has never used smokeless tobacco.  Alcohol: not currently using  Personal Healthcare Goals: lose weight, keep A1c <8%, have more energy to exercise    Medication Adherence/Access: no issues reported but will not take medications if side effects or costly per patient - doesn't like to come to doctor office for visits, busy so gets close to running out of medications sometimes    Type 2 Diabetes:  Currently taking pioglitazone 45 mg daily. Has not been taking glipizide. Patient is not experiencing side effects. Was on Januvia in the past but too expensive. Will not go on any medication if too expensive or side effects. Unwilling to try metformin again d/t previous side effects. He is not willing to go on insulin.  Blood sugar monitoring: Hasn't been doing lately  Symptoms of low blood sugar? none  Symptoms of high blood sugar? Fatigue, polyphagia  Eye exam: up to date  Foot exam: due  Diet/Exercise: Reports his diet has been good. Eats a lot of veggies. Has Diabetes code book and has read it. Small breakfast and small lunch - plant.  Rare donut or bagel - will have one today. Loves popcorn - tries to limit. Walks most days[facility] Tries to exercise more which helps blood glucose.  Aspirin: Not taking due to age  Statin: No   ACEi/ARB: No.   Urine Albumin:   Lab Results   Component Value Date    ALCR  02/02/2022      Comment:       Unable to calculate:  Urine creatinine or albumin value below detectable level     Lab Results   Component Value Date    A1C 10.6 01/12/2023    A1C 9.4 06/14/2022    A1C 7.9 02/02/2022    A1C 7.8 05/03/2021    A1C 7.5 02/01/2021    A1C 6.9 02/10/2020    A1C 7.1 11/04/2019    A1C 7.2 07/22/2019     Wt Readings from Last 3 Encounters:   01/12/23 175 lb (79.4 kg)   06/14/22 169 lb (76.7 kg)   02/04/22 181 lb 8 oz (82.3 kg)       Hypertension: Current medications include doxazosin 2 mg bedtime and metoprolol 12.5 mg once daily.  Patient does not self-monitor blood pressure. Patient reports no current medication side effects.  BP Readings from Last 3 Encounters:   01/12/23 (!) 146/82   06/14/22 128/76   02/04/22 (!) 140/56       Hyperlipidemia: Current therapy includes red yeast extract supplement some days. Declines statin.  Patient reports no significant myalgias or other side effects.  Recent Labs   Lab Test 02/02/22  0953 02/01/21  0749   CHOL 193 235*   HDL 46 52   * 158*   TRIG 123 124       BPH: Patient taking doxazosin 2mg daily and tamsulosin 0.4mg once daily. Stopped finasteride himself, didn't find effective. On tamsulosin and doxazosin b/c was going to get TURP procedure but then didn't. Not sure if all these medications effective, no longer getting surgery. Didn't get b/c cost.     Glaucoma: Patient taking latanoprost 0.005% both eyes daily and timolol 0.5% daily. No issues.     Immunizations: Patient has not gotten new shingles vaccine due to cost in the past. Believes insurance company may pay more for it this year and has been saving up for it. Patient is also due for tetanus shot and since the patient often travels to [place] it would be best to get it before traveling again.  Most Recent Immunizations   Administered Date(s) Administered     COVID-19 Vaccine 12+ (Pfizer 2022) 06/14/2022     COVID-19 Vaccine 12+ (Pfizer) 10/12/2021     COVID-19 Vaccine Bivalent Booster 18+ (Moderna) 10/26/2022      FLU 6-35 months 09/04/2020     Influenza (High Dose) 3 valent vaccine 12/05/2019     Influenza (IIV3) PF 12/12/2012     Influenza Vaccine 65+ (Fluzone HD) 11/01/2022     Influenza Vaccine >6 months (Alfuria,Fluzone) 11/25/2013     Nasal Influenza Vaccine 2-49 (FluMist) 10/30/2014     Pneumo Conj 13-V (2010&after) 11/21/2016     Pneumococcal 23 valent 04/16/2012     TDAP Vaccine (Adacel) 01/11/2013     Zoster vaccine, live 04/16/2012       Today's Vitals: BP (!) 146/82   Wt 175 lb (79.4 kg)   BMI 27.82 kg/m    ----------------      I spent 40 minutes with this patient today. All changes were made via collaborative practice agreement with Albert Huggins MD. A copy of the visit note was provided to the patient's provider(s).    A summary of these recommendations was given to the patient.    Lisa Celeste, PharmD, [facility]  Medication Therapy Management Provider, [facility]  Pager: [phone]     Medication Therapy Recommendations  Benign non-nodular prostatic hyperplasia without lower urinary tract symptoms    Current Medication: tamsulosin (FLOMAX) 0.4 MG capsule   Rationale: Duplicate Therapy - Unnecessary medication therapy - Indication   Recommendation: Discontinue Medication - doxazosin 4 MG tablet   Status: Accepted per CPA         Encounter for immunization    Rationale: Preventive therapy - Needs additional medication therapy - Indication   Recommendation: Order Vaccine - Tdap (tetanus-diphtheria-acell pertussis) 5-2-15.5 LF-MCG/0.5 injection - Tetanus and Shingles vaccines   Status: Accepted per CPA         Essential hypertension with goal blood pressure less than 140/90    Current Medication: metoprolol succinate ER (TOPROL XL) 25 MG 24 hr tablet   Rationale: Dose too low - Dosage too low - Effectiveness   Recommendation: Increase Dose - metoprolol succinate ER 25 MG 24 hr tablet   Status: Accepted per CPA         Type 2 diabetes mellitus with hyperglycemia, without long-term  current use of insulin (H)    Current Medication: glipiZIDE (GLUCOTROL) 10 MG tablet   Rationale: Patient prefers not to take - Adherence - Adherence   Recommendation: Provide Adherence Intervention   Status: Patient Agreed - Adherence/Education

## 2023-01-12 ENCOUNTER — LAB (OUTPATIENT)
Dept: LAB | Facility: CLINIC | Age: 78
End: 2023-01-12
Payer: MEDICARE

## 2023-01-12 ENCOUNTER — ALLIED HEALTH/NURSE VISIT (OUTPATIENT)
Dept: PHARMACY | Facility: CLINIC | Age: 78
End: 2023-01-12
Payer: COMMERCIAL

## 2023-01-12 VITALS — BODY MASS INDEX: 27.82 KG/M2 | WEIGHT: 175 LBS | SYSTOLIC BLOOD PRESSURE: 146 MMHG | DIASTOLIC BLOOD PRESSURE: 82 MMHG

## 2023-01-12 DIAGNOSIS — E78.5 HYPERLIPIDEMIA LDL GOAL <100: ICD-10-CM

## 2023-01-12 DIAGNOSIS — E11.65 TYPE 2 DIABETES MELLITUS WITH HYPERGLYCEMIA, WITHOUT LONG-TERM CURRENT USE OF INSULIN (H): Primary | ICD-10-CM

## 2023-01-12 DIAGNOSIS — N40.0 BENIGN NON-NODULAR PROSTATIC HYPERPLASIA WITHOUT LOWER URINARY TRACT SYMPTOMS: ICD-10-CM

## 2023-01-12 DIAGNOSIS — I10 ESSENTIAL HYPERTENSION WITH GOAL BLOOD PRESSURE LESS THAN 140/90: ICD-10-CM

## 2023-01-12 DIAGNOSIS — E11.65 TYPE 2 DIABETES MELLITUS WITH HYPERGLYCEMIA, WITHOUT LONG-TERM CURRENT USE OF INSULIN (H): ICD-10-CM

## 2023-01-12 DIAGNOSIS — H40.003 GLAUCOMA SUSPECT, BILATERAL: ICD-10-CM

## 2023-01-12 DIAGNOSIS — Z23 ENCOUNTER FOR IMMUNIZATION: ICD-10-CM

## 2023-01-12 LAB
CHOLEST SERPL-MCNC: 244 MG/DL
CREAT UR-MCNC: 61.3 MG/DL
HBA1C MFR BLD: 10.6 % (ref 0–5.6)
HDLC SERPL-MCNC: 46 MG/DL
LDLC SERPL CALC-MCNC: 169 MG/DL
MICROALBUMIN UR-MCNC: <12 MG/L
MICROALBUMIN/CREAT UR: NORMAL MG/G{CREAT}
NONHDLC SERPL-MCNC: 198 MG/DL
TRIGL SERPL-MCNC: 145 MG/DL

## 2023-01-12 PROCEDURE — 83036 HEMOGLOBIN GLYCOSYLATED A1C: CPT

## 2023-01-12 PROCEDURE — 82570 ASSAY OF URINE CREATININE: CPT

## 2023-01-12 PROCEDURE — 36415 COLL VENOUS BLD VENIPUNCTURE: CPT

## 2023-01-12 PROCEDURE — 80061 LIPID PANEL: CPT

## 2023-01-12 PROCEDURE — 99207 PR NO CHARGE LOS: CPT | Performed by: PHARMACIST

## 2023-01-12 PROCEDURE — 82043 UR ALBUMIN QUANTITATIVE: CPT

## 2023-01-12 RX ORDER — METOPROLOL SUCCINATE 25 MG/1
25 TABLET, EXTENDED RELEASE ORAL DAILY
Qty: 90 TABLET | Refills: 1 | Status: SHIPPED | OUTPATIENT
Start: 2023-01-12 | End: 2023-04-24

## 2023-01-12 RX ORDER — GLIPIZIDE 10 MG/1
10 TABLET ORAL
Qty: 180 TABLET | Refills: 1 | Status: SHIPPED | OUTPATIENT
Start: 2023-01-12 | End: 2023-06-27

## 2023-01-12 RX ORDER — PIOGLITAZONEHYDROCHLORIDE 45 MG/1
45 TABLET ORAL DAILY
Qty: 90 TABLET | Refills: 3 | Status: SHIPPED | OUTPATIENT
Start: 2023-01-12 | End: 2024-01-04

## 2023-01-12 RX ORDER — TAMSULOSIN HYDROCHLORIDE 0.4 MG/1
0.4 CAPSULE ORAL DAILY
Qty: 90 CAPSULE | Refills: 1 | Status: SHIPPED | OUTPATIENT
Start: 2023-01-12 | End: 2023-04-24

## 2023-01-12 RX ORDER — DOXAZOSIN 4 MG/1
2 TABLET ORAL AT BEDTIME
Qty: 45 TABLET | Refills: 1 | Status: SHIPPED | OUTPATIENT
Start: 2023-01-12 | End: 2023-06-27

## 2023-01-12 NOTE — PATIENT INSTRUCTIONS
"Recommendations from today's MTM visit:                                                         Restart glipizide 10 mg daily before a meal for 1 week then increase to twice daily before meals.  Increase metoprolol ER to 25 mg daily (full tablet).  Blood glucose goals: fasting in the morning should be  mg/dL and 2-3 hours after a meal should be less than 180 mg/dL  Try stopping tamsulosin and only taking doxazosin - see if any changes in your symptoms. If none, stay off tamsulosin. If worsened symptoms, go back on tamsulosin 0.4 mg daily.   Get your Shingrix and tetanus booster at the pharmacy.       It was great speaking with you today.  I value your experience and would be very thankful for your time in providing feedback in our clinic survey. In the next few days, you may receive an email or text message from [employer] with a link to a survey related to your  clinical pharmacist.\"     To schedule another MTM appointment, please call the clinic directly or you may call the MTM scheduling line at [phone] or toll-free at [phone].     My Clinical Pharmacist's contact information:                                                      Please feel free to contact me with any questions or concerns you have.      Lisa Celeste, PharmD, BCACP  Medication Therapy Management Provider, [facility]  "

## 2023-03-08 ENCOUNTER — OFFICE VISIT (OUTPATIENT)
Dept: FAMILY MEDICINE | Facility: CLINIC | Age: 78
End: 2023-03-08
Payer: MEDICARE

## 2023-03-08 VITALS
RESPIRATION RATE: 13 BRPM | BODY MASS INDEX: 28.04 KG/M2 | HEIGHT: 68 IN | HEART RATE: 61 BPM | WEIGHT: 185 LBS | OXYGEN SATURATION: 97 % | SYSTOLIC BLOOD PRESSURE: 147 MMHG | DIASTOLIC BLOOD PRESSURE: 57 MMHG

## 2023-03-08 DIAGNOSIS — R00.2 HEART PALPITATIONS: Primary | ICD-10-CM

## 2023-03-08 DIAGNOSIS — R53.83 FATIGUE, UNSPECIFIED TYPE: ICD-10-CM

## 2023-03-08 DIAGNOSIS — R01.1 HEART MURMUR: ICD-10-CM

## 2023-03-08 DIAGNOSIS — E11.65 TYPE 2 DIABETES MELLITUS WITH HYPERGLYCEMIA, WITHOUT LONG-TERM CURRENT USE OF INSULIN (H): ICD-10-CM

## 2023-03-08 LAB
ERYTHROCYTE [DISTWIDTH] IN BLOOD BY AUTOMATED COUNT: 13.7 % (ref 10–15)
HCT VFR BLD AUTO: 43.2 % (ref 40–53)
HGB BLD-MCNC: 13.9 G/DL (ref 13.3–17.7)
MCH RBC QN AUTO: 30.2 PG (ref 26.5–33)
MCHC RBC AUTO-ENTMCNC: 32.2 G/DL (ref 31.5–36.5)
MCV RBC AUTO: 94 FL (ref 78–100)
PLATELET # BLD AUTO: 189 10E3/UL (ref 150–450)
RBC # BLD AUTO: 4.61 10E6/UL (ref 4.4–5.9)
WBC # BLD AUTO: 4.6 10E3/UL (ref 4–11)

## 2023-03-08 PROCEDURE — 80048 BASIC METABOLIC PNL TOTAL CA: CPT

## 2023-03-08 PROCEDURE — 84443 ASSAY THYROID STIM HORMONE: CPT

## 2023-03-08 PROCEDURE — 85027 COMPLETE CBC AUTOMATED: CPT

## 2023-03-08 PROCEDURE — 99214 OFFICE O/P EST MOD 30 MIN: CPT

## 2023-03-08 PROCEDURE — 36415 COLL VENOUS BLD VENIPUNCTURE: CPT

## 2023-03-08 PROCEDURE — 83735 ASSAY OF MAGNESIUM: CPT

## 2023-03-08 PROCEDURE — 93000 ELECTROCARDIOGRAM COMPLETE: CPT

## 2023-03-08 NOTE — PROGRESS NOTES
"  Assessment & Plan     (R00.2) Heart palpitations  (primary encounter diagnosis)  (R53.83) Fatigue, unspecified type  (R01.1) Heart murmur  Comment: holter monitor for 10 days, TSH, electrolytes and BMP. Given increasing fatigue and exam noting a murmur complete echo and cardiology referral placed. EKG done today which showed RBBB which was noted in his 2016 EKG as well.  Will follow up on lab results. Patient fully understands and is agreeable with plan of care.   Plan: Adult Leadless EKG Monitor 8 to 14 Days, Basic         metabolic panel  (Ca, Cl, CO2, Creat, Gluc, K,         Na, BUN), Magnesium,  TSH with free T4 reflex, CBC        with platelets, EKG 12-lead complete         w/read - Clinics, Adult Cardiology Eval          Referral, Echocardiogram Complete with Lumason, EKG         12-lead complete w/read - Clinics    (E11.65) Type 2 diabetes mellitus with hyperglycemia, without long-term current use of insulin (H)  Comment: uncontrolled, being followed by PCP and MTM on this. Last hgb a1c 10.6. currently on Actos and Glipizide. Would be willing to try something else in addition but expense is a concern. Intends to follow up with Lisa Celeste in regards to this.   Plan: as above.     34 minutes spent on the date of the encounter doing chart review, history and exam, documentation and further activities per the note       BMI:   Estimated body mass index is 28.55 kg/m  as calculated from the following:    Height as of this encounter: 1.715 m (5' 7.5\").    Weight as of this encounter: 83.9 kg (185 lb).           No follow-ups on file.    OTTONIEL Anglin [facility]   Kushal is a 77 year old presenting for the following health issues:  Palpitations      History of Present Illness       He eats 2-3 servings of fruits and vegetables daily.He consumes 0 sweetened beverage(s) daily.He exercises with enough effort to increase his heart rate 30 to 60 minutes per " "day.  He exercises with enough effort to increase his heart rate 5 days per week.   He is taking medications regularly.       Concern - heart palpitation  Onset: 4 days ago   Description: patient started having heart palpitations 4 days ago. Feels like there is a extra strong heart beat and then its not regular. Its on and off.   Intensity: mild  Progression of Symptoms:  improving  Accompanying Signs & Symptoms: none  Previous history of similar problem: yes in 2016 was the latest episode. He went to a cardiologist at the . They told him he has the less serious  A fib.  Precipitating factors:        Worsened by: excessive stress but is usually not stressed out  Alleviating factors:        Improved by: none  Therapies tried and outcome:  none     More tired lately, has noted frequent palpitations  Went to Dr. Win in 2016 for palpitations and stress test, patient was doing well and signed off   Denies associated symptoms of chest pain, SOB, dizziness/lightheadness, numbness/tingling, n/v  Works out every day at the [facility], hasn't been able to do as rigorous of activity because he feels his feet are a \"bit clunky\" and tires out more easily  Recently saw MTROCHELLE tran in regards to diabetes, uncontrolled, on Actos and glipizide  Goes to [place] every summer and does not follow heart healthy or diabetic conscious diet  Is going to try working on this.    Review of Systems   Constitutional, cardiovascular, pulmonary, GI, , endocrine and psych systems are negative, except as otherwise noted.      Objective    BP (!) 147/57 (BP Location: Right arm, Patient Position: Sitting, Cuff Size: Adult Regular)   Pulse 61   Resp 13   Ht 1.715 m (5' 7.5\")   Wt 83.9 kg (185 lb)   SpO2 97%   BMI 28.55 kg/m    Body mass index is 28.55 kg/m .  Physical Exam  Vitals and nursing note reviewed.   Constitutional:       General: He is not in acute distress.     Appearance: Normal appearance. He is overweight. He is not ill-appearing. "   Cardiovascular:      Rate and Rhythm: Normal rate and regular rhythm.      Pulses:           Dorsalis pedis pulses are 1+ on the right side and 1+ on the left side.        Posterior tibial pulses are 1+ on the right side and 1+ on the left side.      Heart sounds: Murmur heard.     No friction rub. No gallop.   Pulmonary:      Effort: Pulmonary effort is normal. No respiratory distress.      Breath sounds: No wheezing, rhonchi or rales.   Musculoskeletal:      Right lower leg: No edema.      Left lower leg: No edema.   Skin:     General: Skin is warm and dry.   Neurological:      Mental Status: He is alert.   Psychiatric:         Mood and Affect: Mood normal.         Behavior: Behavior normal. Behavior is cooperative.         Thought Content: Thought content normal.         Judgment: Judgment normal.

## 2023-03-09 LAB
ANION GAP SERPL CALCULATED.3IONS-SCNC: 10 MMOL/L (ref 7–15)
BUN SERPL-MCNC: 27.7 MG/DL (ref 8–23)
CALCIUM SERPL-MCNC: 9.6 MG/DL (ref 8.8–10.2)
CHLORIDE SERPL-SCNC: 104 MMOL/L (ref 98–107)
CREAT SERPL-MCNC: 1.04 MG/DL (ref 0.67–1.17)
DEPRECATED HCO3 PLAS-SCNC: 27 MMOL/L (ref 22–29)
GFR SERPL CREATININE-BSD FRML MDRD: 73 ML/MIN/1.73M2
GLUCOSE SERPL-MCNC: 154 MG/DL (ref 70–99)
MAGNESIUM SERPL-MCNC: 2.1 MG/DL (ref 1.7–2.3)
POTASSIUM SERPL-SCNC: 4.6 MMOL/L (ref 3.4–5.3)
SODIUM SERPL-SCNC: 141 MMOL/L (ref 136–145)
TSH SERPL DL<=0.005 MIU/L-ACNC: 3.49 UIU/ML (ref 0.3–4.2)

## 2023-03-10 ENCOUNTER — TELEPHONE (OUTPATIENT)
Dept: FAMILY MEDICINE | Facility: CLINIC | Age: 78
End: 2023-03-10
Payer: MEDICARE

## 2023-03-10 NOTE — TELEPHONE ENCOUNTER
Called pt and relayed provider message below. Patient was given an opportunity to ask questions, verbalized understanding of plan, and is agreeable.    Shayla Ladd RN

## 2023-03-10 NOTE — TELEPHONE ENCOUNTER
----- Message from OTTONIEL Anglin CNP sent at 3/9/2023  8:25 PM CST -----  Edward,  Your kidney panel looks great, your glucose is elevated but this was not a fasting lab and this also correlates with your most recent A1C as well. Magnesium is normal levels, and your Thyroid levels (TSH) look good as well. Let me know if you have any questions or concerns.     OTTONIEL Anglin CNP

## 2023-03-20 ENCOUNTER — HOSPITAL ENCOUNTER (OUTPATIENT)
Dept: CARDIOLOGY | Facility: CLINIC | Age: 78
Discharge: HOME | End: 2023-03-20
Payer: MEDICARE

## 2023-03-20 DIAGNOSIS — R00.2 HEART PALPITATIONS: ICD-10-CM

## 2023-03-20 PROCEDURE — 93248 EXT ECG>7D<15D REV&INTERPJ: CPT | Performed by: INTERNAL MEDICINE

## 2023-03-20 PROCEDURE — 93246 EXT ECG>7D<15D RECORDING: CPT

## 2023-03-31 ENCOUNTER — HOSPITAL ENCOUNTER (OUTPATIENT)
Dept: CARDIOLOGY | Facility: CLINIC | Age: 78
Discharge: HOME | End: 2023-03-31
Payer: MEDICARE

## 2023-03-31 DIAGNOSIS — R01.1 HEART MURMUR: ICD-10-CM

## 2023-03-31 LAB — LVEF ECHO: NORMAL

## 2023-03-31 PROCEDURE — 93306 TTE W/DOPPLER COMPLETE: CPT

## 2023-03-31 PROCEDURE — 93306 TTE W/DOPPLER COMPLETE: CPT | Mod: 26 | Performed by: INTERNAL MEDICINE

## 2023-04-17 NOTE — RESULT ENCOUNTER NOTE
The heart rhythm test is OK. You have supraventricular tachycardia, or SVT. Not very much, either. It causes no harm, and does not have to be treated  Albert Huggins MD

## 2023-04-24 ENCOUNTER — OFFICE VISIT (OUTPATIENT)
Dept: CARDIOLOGY | Facility: CLINIC | Age: 78
End: 2023-04-24
Payer: MEDICARE

## 2023-04-24 VITALS
SYSTOLIC BLOOD PRESSURE: 137 MMHG | BODY MASS INDEX: 27.58 KG/M2 | DIASTOLIC BLOOD PRESSURE: 66 MMHG | OXYGEN SATURATION: 99 % | WEIGHT: 182 LBS | HEART RATE: 58 BPM | HEIGHT: 68 IN

## 2023-04-24 DIAGNOSIS — R01.1 HEART MURMUR: ICD-10-CM

## 2023-04-24 DIAGNOSIS — R53.83 FATIGUE, UNSPECIFIED TYPE: ICD-10-CM

## 2023-04-24 DIAGNOSIS — E78.5 HYPERLIPIDEMIA LDL GOAL <100: Primary | ICD-10-CM

## 2023-04-24 DIAGNOSIS — R00.2 HEART PALPITATIONS: ICD-10-CM

## 2023-04-24 DIAGNOSIS — I10 ESSENTIAL HYPERTENSION WITH GOAL BLOOD PRESSURE LESS THAN 140/90: ICD-10-CM

## 2023-04-24 PROCEDURE — 99203 OFFICE O/P NEW LOW 30 MIN: CPT | Performed by: INTERNAL MEDICINE

## 2023-04-24 RX ORDER — PRAVASTATIN SODIUM 40 MG
40 TABLET ORAL DAILY
Qty: 90 TABLET | Refills: 3 | Status: SHIPPED | OUTPATIENT
Start: 2023-04-24 | End: 2024-02-23

## 2023-04-24 RX ORDER — METOPROLOL SUCCINATE 25 MG/1
12.5 TABLET, EXTENDED RELEASE ORAL DAILY
Qty: 90 TABLET | Refills: 1 | COMMUNITY
Start: 2023-04-24 | End: 2023-06-27

## 2023-04-24 NOTE — LETTER
4/24/2023    Albert Huggins MD  [id number] [facility] [address]    RE: Kushal Bush       Dear Colleague,     I had the pleasure of seeing Kushal Bush in the [facility].  Cardiology Consultation      Kushal Bush MRN# [id number]   YOB: 1945 Age: 77 year old   Date of Visit 04/24/2023     Reason for consult:  Palpitations           Assessment and Plan:     Palpitations, symptomatic PVCs.  Asymptomatic PSVT  Improved on Toprol.  He is only taking 1/2 pill also 12.5 mg daily.  He is borderline bradycardic in the office today.  We discussed that this is an appropriate dose given the improvement in his symptoms.  No structural abnormalities to suggest past infarct or cardiomyopathy.  No significant valvular heart disease.      Hyperlipidemia in a diabetic  Recommended that he restart pravastatin.  We will do 40 mg daily given his LDL    Follow-up in 1 year or as needed.      30 minutes spent today in review of past medical record, discussion with patient and postvisit charting    This note was transcribed using electronic voice recognition software, typographical errors may be present.                Chief Complaint:   New Patient           History of Present Illness:   This patient is a very pleasant 77 year old male that I saw previously in 2017.  He had no obstructive coronary artery disease but mild carotid disease.  I had him on pravastatin previously but he self discontinued after reading potential side effects.  He did not actually have any of those side effects, however.    He does have diabetes.  His lipids are greater than 160 LDL.  He has known mild carotid artery plaque on previous ultrasound.  He had noticed some palpitations which correlated with PVCs.  He also had PSVT on a monitor 3/20/2023.    Echocardiogram 3/31/2023 was unremarkable.    He has some gait instability but denies any worsening dyspnea on exertion or any exertional chest discomfort.   "He does not get any presyncope or syncope with his palpitations.         Physical Exam:     Vitals: /66 (BP Location: Right arm, Patient Position: Sitting, Cuff Size: Adult Regular)   Pulse 58   Ht 1.715 m (5' 7.5\")   Wt 82.6 kg (182 lb)   SpO2 99%   BMI 28.08 kg/m    Constitutional:  cooperative, alert and oriented, well developed, well nourished, in no acute distress        Skin:  warm and dry to the touch, no apparent skin lesions or masses noted        Head:  normocephalic, no masses or lesions        Eyes:  pupils equal and round, conjunctivae and lids unremarkable, sclera white, no xanthalasma, EOMS intact, no nystagmus        ENT:  no pallor or cyanosis, dentition good        Neck:  JVP normal        Chest:  normal breath sounds, clear to auscultation, normal A-P diameter, normal symmetry, normal respiratory excursion, no use of accessory muscles        Cardiac: regular rhythm       grade 1;RUSB          Abdomen:  BS normoactive   benign    Extremities and Back:  no deformities, clubbing, cyanosis, erythema observed;no edema        Neurological:  no gross motor deficits;affect appropriate                      Past Medical History:   I have reviewed this patient's past medical history  Past Medical History:   Diagnosis Date    Asthma     Benign non-nodular prostatic hyperplasia without lower urinary tract symptoms 7/18/2016    BPH (benign prostatic hyperplasia) 9/14/2011    Campylobacter diarrhea 7/18/2016    CMC arthritis, thumb, degenerative 11/25/2013    Diabetes type 2, uncontrolled 7/18/2016    Essential hypertension with goal blood pressure less than 140/90 7/18/2016    Fatigue, unspecified type 7/18/2016    Heart palpitations 9/14/2011    HTN (hypertension) 9/14/2011    HTN, goal below 140/90 1/4/2012    Hyperglycemia 10/27/2011    Hyperlipidemia LDL goal <100 10/27/2011    Impacted cerumen 9/14/2011    Intermittent asthma 9/14/2011    Lumbar radiculopathy 11/7/2011    Mumps     Pain in joint " involving pelvic region and thigh, left 6/14/2022    Palpitations     Post-nasal drainage 9/14/2011    Presbycusis 9/14/2011    Seborrheic keratoses 9/14/2011    Tubular adenoma of colon     Type 2 diabetes mellitus without complications (H) 10/12/2015    Type 2 diabetes, HbA1c goal < 7% (H) 1/6/2012    Type II diabetes mellitus with HbA1C goal between 7 and 8 6/18/2013    Uncontrolled type 2 diabetes mellitus without complication, without long-term current use of insulin 12/27/2016             Past Surgical History:   I have reviewed this patient's past surgical history  Past Surgical History:   Procedure Laterality Date    COLONOSCOPY  1/29/14    COLONOSCOPY  1/29/2014    Procedure: COMBINED COLONOSCOPY, SINGLE BIOPSY/POLYPECTOMY BY BIOPSY;  COLONOSCOPY two polyps with jumbo forceps;  Surgeon: Alina Hopkins MD;  Location:  GI    EYE SURGERY Right 12/14/16    cataract removal     ingrown toenail                 Social History:   I have reviewed this patient's social history  Social History     Tobacco Use    Smoking status: Never    Smokeless tobacco: Never    Tobacco comments:     no real history   Vaping Use    Vaping status: Never Used   Substance Use Topics    Alcohol use: Not Currently             Family History:   I have reviewed this patient's family history  Family History   Problem Relation Age of Onset    Cancer Mother         age 89, uterine/breast    Heart Disease Father         51             Allergies:     Allergies   Allergen Reactions    Seasonal Allergies      Pine pollen             Medications:   I have reviewed this patient's current medications  Current Outpatient Medications   Medication Sig Dispense Refill    blood glucose (NO BRAND SPECIFIED) test strip Use to test blood sugar 2 times daily or as directed. To accompany: Blood Glucose Monitor Brands: per insurance. 100 strip 11    doxazosin (CARDURA) 4 MG tablet Take 0.5 tablets (2 mg) by mouth At Bedtime 45 tablet 1    glipiZIDE  (GLUCOTROL) 10 MG tablet Take 1 tablet (10 mg) by mouth 2 times daily (before meals) (Patient taking differently: Take 10 mg by mouth 2 times daily (before meals) Pt taking 1/2 tablet once daily) 180 tablet 1    latanoprost (XALATAN) 0.005 % ophthalmic solution Place 1 drop into both eyes daily      metoprolol succinate ER (TOPROL XL) 25 MG 24 hr tablet Take 0.5 tablets (12.5 mg) by mouth daily 90 tablet 1    pioglitazone (ACTOS) 45 MG tablet Take 1 tablet (45 mg) by mouth daily 90 tablet 3    pravastatin (PRAVACHOL) 40 MG tablet Take 1 tablet (40 mg) by mouth daily 90 tablet 3    timolol maleate (TIMOPTIC) 0.5 % ophthalmic solution PLACE 1 DROP BOTH EYES ONCE DAILY                 Review of Systems:       Review of Systems:  Skin:        Eyes:       ENT:       Respiratory:  Negative    Cardiovascular:    Positive for;palpitations;fatigue  Gastroenterology:      Genitourinary:       Musculoskeletal:       Neurologic:       Psychiatric:       Heme/Lymph/Imm:       Endocrine:                          Data:   All available laboratory data reviewed  Lab Results   Component Value Date    CHOL 244 01/12/2023    CHOL 235 02/01/2021     Lab Results   Component Value Date    HDL 46 01/12/2023    HDL 52 02/01/2021     Lab Results   Component Value Date     01/12/2023     02/01/2021     Lab Results   Component Value Date    TRIG 145 01/12/2023    TRIG 124 02/01/2021     Lab Results   Component Value Date    CHOLHDLRATIO 4.3 01/14/2013     TSH   Date Value Ref Range Status   03/08/2023 3.49 0.30 - 4.20 uIU/mL Final   06/14/2022 2.43 0.40 - 4.00 mU/L Final   11/04/2019 4.34 (H) 0.40 - 4.00 mU/L Final     Last Basic Metabolic Panel:  Lab Results   Component Value Date     03/08/2023     02/01/2021      Lab Results   Component Value Date    POTASSIUM 4.6 03/08/2023    POTASSIUM 4.9 06/14/2022    POTASSIUM 4.4 02/01/2021     Lab Results   Component Value Date    CHLORIDE 104 03/08/2023    CHLORIDE 105  06/14/2022    CHLORIDE 105 02/01/2021     Lab Results   Component Value Date    RAJWINDER 9.6 03/08/2023    RAJWINDER 9.8 02/01/2021     Lab Results   Component Value Date    CO2 27 03/08/2023    CO2 26 06/14/2022    CO2 27 02/01/2021     Lab Results   Component Value Date    BUN 27.7 03/08/2023    BUN 24 06/14/2022    BUN 25 02/01/2021     Lab Results   Component Value Date    CR 1.04 03/08/2023    CR 0.98 02/01/2021     Lab Results   Component Value Date     03/08/2023     06/14/2022     02/01/2021     Lab Results   Component Value Date    WBC 4.6 03/08/2023    WBC 5.3 04/08/2015     Lab Results   Component Value Date    RBC 4.61 03/08/2023    RBC 5.35 04/08/2015     Lab Results   Component Value Date    HGB 13.9 03/08/2023    HGB 16.2 04/08/2015     Lab Results   Component Value Date    HCT 43.2 03/08/2023    HCT 46.7 04/08/2015     Lab Results   Component Value Date    MCV 94 03/08/2023    MCV 87 04/08/2015     Lab Results   Component Value Date    MCH 30.2 03/08/2023    MCH 30.3 04/08/2015     Lab Results   Component Value Date    MCHC 32.2 03/08/2023    MCHC 34.7 04/08/2015     Lab Results   Component Value Date    RDW 13.7 03/08/2023    RDW 12.4 04/08/2015     Lab Results   Component Value Date     03/08/2023     04/08/2015         Thank you for allowing me to participate in the care of your patient.      Sincerely,     Daniel Win MD     [facility]  cc:   Mahad Castellanos, APRN CNP  [id number] [phone]

## 2023-04-24 NOTE — PROGRESS NOTES
Cardiology Consultation      Kushal Bush MRN# [id number]   YOB: 1945 Age: 77 year old   Date of Visit 04/24/2023     Reason for consult:  Palpitations           Assessment and Plan:     1. Palpitations, symptomatic PVCs.  Asymptomatic PSVT    Improved on Toprol.  He is only taking 1/2 pill also 12.5 mg daily.  He is borderline bradycardic in the office today.  We discussed that this is an appropriate dose given the improvement in his symptoms.    No structural abnormalities to suggest past infarct or cardiomyopathy.  No significant valvular heart disease.      2. Hyperlipidemia in a diabetic    Recommended that he restart pravastatin.  We will do 40 mg daily given his LDL    Follow-up in 1 year or as needed.      30 minutes spent today in review of past medical record, discussion with patient and postvisit charting    This note was transcribed using electronic voice recognition software, typographical errors may be present.                Chief Complaint:   New Patient           History of Present Illness:   This patient is a very pleasant 77 year old male that I saw previously in 2017.  He had no obstructive coronary artery disease but mild carotid disease.  I had him on pravastatin previously but he self discontinued after reading potential side effects.  He did not actually have any of those side effects, however.    He does have diabetes.  His lipids are greater than 160 LDL.  He has known mild carotid artery plaque on previous ultrasound.  He had noticed some palpitations which correlated with PVCs.  He also had PSVT on a monitor 3/20/2023.    Echocardiogram 3/31/2023 was unremarkable.    He has some gait instability but denies any worsening dyspnea on exertion or any exertional chest discomfort.  He does not get any presyncope or syncope with his palpitations.         Physical Exam:     Vitals: /66 (BP Location: Right arm, Patient Position: Sitting, Cuff Size: Adult Regular)   Pulse  "58   Ht 1.715 m (5' 7.5\")   Wt 82.6 kg (182 lb)   SpO2 99%   BMI 28.08 kg/m    Constitutional:  cooperative, alert and oriented, well developed, well nourished, in no acute distress        Skin:  warm and dry to the touch, no apparent skin lesions or masses noted        Head:  normocephalic, no masses or lesions        Eyes:  pupils equal and round, conjunctivae and lids unremarkable, sclera white, no xanthalasma, EOMS intact, no nystagmus        ENT:  no pallor or cyanosis, dentition good        Neck:  JVP normal        Chest:  normal breath sounds, clear to auscultation, normal A-P diameter, normal symmetry, normal respiratory excursion, no use of accessory muscles        Cardiac: regular rhythm       grade 1;RUSB          Abdomen:  BS normoactive   benign    Extremities and Back:  no deformities, clubbing, cyanosis, erythema observed;no edema        Neurological:  no gross motor deficits;affect appropriate                      Past Medical History:   I have reviewed this patient's past medical history  Past Medical History:   Diagnosis Date     Asthma      Benign non-nodular prostatic hyperplasia without lower urinary tract symptoms 7/18/2016     BPH (benign prostatic hyperplasia) 9/14/2011     Campylobacter diarrhea 7/18/2016     CMC arthritis, thumb, degenerative 11/25/2013     Diabetes type 2, uncontrolled 7/18/2016     Essential hypertension with goal blood pressure less than 140/90 7/18/2016     Fatigue, unspecified type 7/18/2016     Heart palpitations 9/14/2011     HTN (hypertension) 9/14/2011     HTN, goal below 140/90 1/4/2012     Hyperglycemia 10/27/2011     Hyperlipidemia LDL goal <100 10/27/2011     Impacted cerumen 9/14/2011     Intermittent asthma 9/14/2011     Lumbar radiculopathy 11/7/2011     Mumps      Pain in joint involving pelvic region and thigh, left 6/14/2022     Palpitations      Post-nasal drainage 9/14/2011     Presbycusis 9/14/2011     Seborrheic keratoses 9/14/2011     Tubular " adenoma of colon      Type 2 diabetes mellitus without complications (H) 10/12/2015     Type 2 diabetes, HbA1c goal < 7% (H) 1/6/2012     Type II diabetes mellitus with HbA1C goal between 7 and 8 6/18/2013     Uncontrolled type 2 diabetes mellitus without complication, without long-term current use of insulin 12/27/2016             Past Surgical History:   I have reviewed this patient's past surgical history  Past Surgical History:   Procedure Laterality Date     COLONOSCOPY  1/29/14     COLONOSCOPY  1/29/2014    Procedure: COMBINED COLONOSCOPY, SINGLE BIOPSY/POLYPECTOMY BY BIOPSY;  COLONOSCOPY two polyps with jumbo forceps;  Surgeon: Alina Hopkins MD;  Location:  GI     EYE SURGERY Right 12/14/16    cataract removal      ingrown toenail                 Social History:   I have reviewed this patient's social history  Social History     Tobacco Use     Smoking status: Never     Smokeless tobacco: Never     Tobacco comments:     no real history   Vaping Use     Vaping status: Never Used   Substance Use Topics     Alcohol use: Not Currently             Family History:   I have reviewed this patient's family history  Family History   Problem Relation Age of Onset     Cancer Mother         age 89, uterine/breast     Heart Disease Father         51             Allergies:     Allergies   Allergen Reactions     Seasonal Allergies      Pine pollen             Medications:   I have reviewed this patient's current medications  Current Outpatient Medications   Medication Sig Dispense Refill     blood glucose (NO BRAND SPECIFIED) test strip Use to test blood sugar 2 times daily or as directed. To accompany: Blood Glucose Monitor Brands: per insurance. 100 strip 11     doxazosin (CARDURA) 4 MG tablet Take 0.5 tablets (2 mg) by mouth At Bedtime 45 tablet 1     glipiZIDE (GLUCOTROL) 10 MG tablet Take 1 tablet (10 mg) by mouth 2 times daily (before meals) (Patient taking differently: Take 10 mg by mouth 2 times daily  (before meals) Pt taking 1/2 tablet once daily) 180 tablet 1     latanoprost (XALATAN) 0.005 % ophthalmic solution Place 1 drop into both eyes daily       metoprolol succinate ER (TOPROL XL) 25 MG 24 hr tablet Take 0.5 tablets (12.5 mg) by mouth daily 90 tablet 1     pioglitazone (ACTOS) 45 MG tablet Take 1 tablet (45 mg) by mouth daily 90 tablet 3     pravastatin (PRAVACHOL) 40 MG tablet Take 1 tablet (40 mg) by mouth daily 90 tablet 3     timolol maleate (TIMOPTIC) 0.5 % ophthalmic solution PLACE 1 DROP BOTH EYES ONCE DAILY                 Review of Systems:       Review of Systems:  Skin:        Eyes:       ENT:       Respiratory:  Negative    Cardiovascular:    Positive for;palpitations;fatigue  Gastroenterology:      Genitourinary:       Musculoskeletal:       Neurologic:       Psychiatric:       Heme/Lymph/Imm:       Endocrine:                          Data:   All available laboratory data reviewed  Lab Results   Component Value Date    CHOL 244 01/12/2023    CHOL 235 02/01/2021     Lab Results   Component Value Date    HDL 46 01/12/2023    HDL 52 02/01/2021     Lab Results   Component Value Date     01/12/2023     02/01/2021     Lab Results   Component Value Date    TRIG 145 01/12/2023    TRIG 124 02/01/2021     Lab Results   Component Value Date    CHOLHDLRATIO 4.3 01/14/2013     TSH   Date Value Ref Range Status   03/08/2023 3.49 0.30 - 4.20 uIU/mL Final   06/14/2022 2.43 0.40 - 4.00 mU/L Final   11/04/2019 4.34 (H) 0.40 - 4.00 mU/L Final     Last Basic Metabolic Panel:  Lab Results   Component Value Date     03/08/2023     02/01/2021      Lab Results   Component Value Date    POTASSIUM 4.6 03/08/2023    POTASSIUM 4.9 06/14/2022    POTASSIUM 4.4 02/01/2021     Lab Results   Component Value Date    CHLORIDE 104 03/08/2023    CHLORIDE 105 06/14/2022    CHLORIDE 105 02/01/2021     Lab Results   Component Value Date    RAJWINDER 9.6 03/08/2023    RAJWINDER 9.8 02/01/2021     Lab Results    Component Value Date    CO2 27 03/08/2023    CO2 26 06/14/2022    CO2 27 02/01/2021     Lab Results   Component Value Date    BUN 27.7 03/08/2023    BUN 24 06/14/2022    BUN 25 02/01/2021     Lab Results   Component Value Date    CR 1.04 03/08/2023    CR 0.98 02/01/2021     Lab Results   Component Value Date     03/08/2023     06/14/2022     02/01/2021     Lab Results   Component Value Date    WBC 4.6 03/08/2023    WBC 5.3 04/08/2015     Lab Results   Component Value Date    RBC 4.61 03/08/2023    RBC 5.35 04/08/2015     Lab Results   Component Value Date    HGB 13.9 03/08/2023    HGB 16.2 04/08/2015     Lab Results   Component Value Date    HCT 43.2 03/08/2023    HCT 46.7 04/08/2015     Lab Results   Component Value Date    MCV 94 03/08/2023    MCV 87 04/08/2015     Lab Results   Component Value Date    MCH 30.2 03/08/2023    MCH 30.3 04/08/2015     Lab Results   Component Value Date    MCHC 32.2 03/08/2023    MCHC 34.7 04/08/2015     Lab Results   Component Value Date    RDW 13.7 03/08/2023    RDW 12.4 04/08/2015     Lab Results   Component Value Date     03/08/2023     04/08/2015

## 2023-05-15 ENCOUNTER — PATIENT OUTREACH (OUTPATIENT)
Dept: CARE COORDINATION | Facility: CLINIC | Age: 78
End: 2023-05-15
Payer: MEDICARE

## 2023-05-30 ENCOUNTER — PATIENT OUTREACH (OUTPATIENT)
Dept: CARE COORDINATION | Facility: CLINIC | Age: 78
End: 2023-05-30
Payer: MEDICARE

## 2023-06-20 SDOH — HEALTH STABILITY: PHYSICAL HEALTH: ON AVERAGE, HOW MANY DAYS PER WEEK DO YOU ENGAGE IN MODERATE TO STRENUOUS EXERCISE (LIKE A BRISK WALK)?: 7 DAYS

## 2023-06-20 SDOH — ECONOMIC STABILITY: INCOME INSECURITY: HOW HARD IS IT FOR YOU TO PAY FOR THE VERY BASICS LIKE FOOD, HOUSING, MEDICAL CARE, AND HEATING?: NOT HARD AT ALL

## 2023-06-20 SDOH — ECONOMIC STABILITY: INCOME INSECURITY: IN THE LAST 12 MONTHS, WAS THERE A TIME WHEN YOU WERE NOT ABLE TO PAY THE MORTGAGE OR RENT ON TIME?: NO

## 2023-06-20 SDOH — ECONOMIC STABILITY: FOOD INSECURITY: WITHIN THE PAST 12 MONTHS, YOU WORRIED THAT YOUR FOOD WOULD RUN OUT BEFORE YOU GOT MONEY TO BUY MORE.: NEVER TRUE

## 2023-06-20 SDOH — HEALTH STABILITY: PHYSICAL HEALTH: ON AVERAGE, HOW MANY MINUTES DO YOU ENGAGE IN EXERCISE AT THIS LEVEL?: 40 MIN

## 2023-06-20 SDOH — ECONOMIC STABILITY: FOOD INSECURITY: WITHIN THE PAST 12 MONTHS, THE FOOD YOU BOUGHT JUST DIDN'T LAST AND YOU DIDN'T HAVE MONEY TO GET MORE.: NEVER TRUE

## 2023-06-20 ASSESSMENT — LIFESTYLE VARIABLES
AUDIT-C TOTAL SCORE: 1
HOW MANY STANDARD DRINKS CONTAINING ALCOHOL DO YOU HAVE ON A TYPICAL DAY: PATIENT DOES NOT DRINK
SKIP TO QUESTIONS 9-10: 1
HOW OFTEN DO YOU HAVE A DRINK CONTAINING ALCOHOL: MONTHLY OR LESS
HOW OFTEN DO YOU HAVE SIX OR MORE DRINKS ON ONE OCCASION: NEVER

## 2023-06-20 ASSESSMENT — ENCOUNTER SYMPTOMS
CHILLS: 0
JOINT SWELLING: 0
DIARRHEA: 0
HEMATOCHEZIA: 0
NERVOUS/ANXIOUS: 0
HEADACHES: 0
FREQUENCY: 1
COUGH: 0
HEARTBURN: 0
HEMATURIA: 0
SHORTNESS OF BREATH: 0
PARESTHESIAS: 0
ABDOMINAL PAIN: 0
FEVER: 0
MYALGIAS: 0
DYSURIA: 0
SORE THROAT: 0
CONSTIPATION: 0
PALPITATIONS: 0
ARTHRALGIAS: 0
EYE PAIN: 0
NAUSEA: 0
WEAKNESS: 0
DIZZINESS: 0

## 2023-06-20 ASSESSMENT — SOCIAL DETERMINANTS OF HEALTH (SDOH)
HOW OFTEN DO YOU GET TOGETHER WITH FRIENDS OR RELATIVES?: THREE TIMES A WEEK
DO YOU BELONG TO ANY CLUBS OR ORGANIZATIONS SUCH AS CHURCH GROUPS UNIONS, FRATERNAL OR ATHLETIC GROUPS, OR SCHOOL GROUPS?: YES
IN A TYPICAL WEEK, HOW MANY TIMES DO YOU TALK ON THE PHONE WITH FAMILY, FRIENDS, OR NEIGHBORS?: ONCE A WEEK
HOW OFTEN DO YOU ATTEND CHURCH OR RELIGIOUS SERVICES?: MORE THAN 4 TIMES PER YEAR

## 2023-06-20 ASSESSMENT — ACTIVITIES OF DAILY LIVING (ADL): CURRENT_FUNCTION: NO ASSISTANCE NEEDED

## 2023-06-27 ENCOUNTER — OFFICE VISIT (OUTPATIENT)
Dept: FAMILY MEDICINE | Facility: CLINIC | Age: 78
End: 2023-06-27
Payer: MEDICARE

## 2023-06-27 VITALS
DIASTOLIC BLOOD PRESSURE: 67 MMHG | RESPIRATION RATE: 16 BRPM | BODY MASS INDEX: 27.47 KG/M2 | TEMPERATURE: 97.7 F | SYSTOLIC BLOOD PRESSURE: 149 MMHG | OXYGEN SATURATION: 96 % | WEIGHT: 175 LBS | HEART RATE: 57 BPM | HEIGHT: 67 IN

## 2023-06-27 DIAGNOSIS — I10 ESSENTIAL HYPERTENSION WITH GOAL BLOOD PRESSURE LESS THAN 140/90: ICD-10-CM

## 2023-06-27 DIAGNOSIS — Z00.00 ENCOUNTER FOR MEDICARE ANNUAL WELLNESS EXAM: ICD-10-CM

## 2023-06-27 DIAGNOSIS — E11.65 TYPE 2 DIABETES MELLITUS WITH HYPERGLYCEMIA, WITHOUT LONG-TERM CURRENT USE OF INSULIN (H): Primary | ICD-10-CM

## 2023-06-27 DIAGNOSIS — N40.0 BENIGN NON-NODULAR PROSTATIC HYPERPLASIA WITHOUT LOWER URINARY TRACT SYMPTOMS: ICD-10-CM

## 2023-06-27 LAB — HBA1C MFR BLD: 7.6 % (ref 0–5.6)

## 2023-06-27 PROCEDURE — 83036 HEMOGLOBIN GLYCOSYLATED A1C: CPT | Performed by: PHYSICIAN ASSISTANT

## 2023-06-27 PROCEDURE — G0439 PPPS, SUBSEQ VISIT: HCPCS | Performed by: PHYSICIAN ASSISTANT

## 2023-06-27 PROCEDURE — 36415 COLL VENOUS BLD VENIPUNCTURE: CPT | Performed by: PHYSICIAN ASSISTANT

## 2023-06-27 PROCEDURE — 99214 OFFICE O/P EST MOD 30 MIN: CPT | Mod: 25 | Performed by: PHYSICIAN ASSISTANT

## 2023-06-27 RX ORDER — METOPROLOL SUCCINATE 25 MG/1
12.5 TABLET, EXTENDED RELEASE ORAL DAILY
Qty: 90 TABLET | Refills: 1 | Status: SHIPPED | OUTPATIENT
Start: 2023-06-27 | End: 2024-02-23

## 2023-06-27 RX ORDER — DOXAZOSIN 4 MG/1
2 TABLET ORAL AT BEDTIME
Qty: 45 TABLET | Refills: 1 | Status: SHIPPED | OUTPATIENT
Start: 2023-06-27 | End: 2024-01-04

## 2023-06-27 RX ORDER — GLIPIZIDE 10 MG/1
TABLET ORAL
Qty: 180 TABLET | Refills: 1 | Status: SHIPPED | OUTPATIENT
Start: 2023-06-27 | End: 2024-02-23

## 2023-06-27 ASSESSMENT — ENCOUNTER SYMPTOMS
CONSTIPATION: 0
EYE PAIN: 0
ARTHRALGIAS: 0
SHORTNESS OF BREATH: 0
SORE THROAT: 0
FEVER: 0
DYSURIA: 0
FREQUENCY: 1
DIZZINESS: 0
WEAKNESS: 0
COUGH: 0
HEMATURIA: 0
HEARTBURN: 0
CHILLS: 0
HEADACHES: 0
PARESTHESIAS: 0
DIARRHEA: 0
PALPITATIONS: 0
ABDOMINAL PAIN: 0
NAUSEA: 0
NERVOUS/ANXIOUS: 0
HEMATOCHEZIA: 0
JOINT SWELLING: 0
MYALGIAS: 0

## 2023-06-27 ASSESSMENT — ACTIVITIES OF DAILY LIVING (ADL): CURRENT_FUNCTION: NO ASSISTANCE NEEDED

## 2023-06-27 NOTE — PROGRESS NOTES
"SUBJECTIVE:   Kushal is a 77 year old who presents for Preventive Visit.       No data to display              Are you in the first 12 months of your Medicare coverage?  No    Healthy Habits:     In general, how would you rate your overall health?  Good    Frequency of exercise:  4-5 days/week    Duration of exercise:  45-60 minutes    Do you usually eat at least 4 servings of fruit and vegetables a day, include whole grains    & fiber and avoid regularly eating high fat or \"junk\" foods?  Yes    Taking medications regularly:  Yes    Medication side effects:  None    Ability to successfully perform activities of daily living:  No assistance needed    Home Safety:  No safety concerns identified    Hearing Impairment:  Difficult to understand a speaker at a public meeting or [employer] day service    In the past 6 months, have you been bothered by leaking of urine?  No    In general, how would you rate your overall mental or emotional health?  Excellent      PHQ-2 Total Score: 0    Additional concerns today:  No      Diabetes Follow-up      How often are you checking your blood sugar? Not at all    What concerns do you have today about your diabetes? Low blood sugar in the pm. Improved with reducing glipizide at 1/2 tab in the am and 1 tab in the pm.      Do you have any of these symptoms? (Select all that apply)  No numbness or tingling in feet.  No redness, sores or blisters on feet.  No complaints of excessive thirst.  No reports of blurry vision.  No significant changes to weight.      BP Readings from Last 2 Encounters:   06/27/23 (!) 149/67   04/24/23 137/66     Hemoglobin A1C (%)   Date Value   06/27/2023 7.6 (H)   01/12/2023 10.6 (H)   05/03/2021 7.8 (H)   02/01/2021 7.5 (H)     LDL Cholesterol Calculated (mg/dL)   Date Value   01/12/2023 169 (H)   02/02/2022 122 (H)   02/01/2021 158 (H)   11/04/2019 160 (H)         Hypertension Follow-up      Do you check your blood pressure regularly outside of the clinic? Yes but " not recently.     Are you following a low salt diet? No    Are your blood pressures ever more than 140 on the top number (systolic) OR more   than 90 on the bottom number (diastolic), for example 140/90? NA         Have you ever done Advance Care Planning? (For example, a Health Directive, POLST, or a discussion with a medical provider or your loved ones about your wishes): Yes, advance care planning is on file.      Fall risk  Fallen 2 or more times in the past year?: No  Any fall with injury in the past year?: No    Cognitive Screening   1) Repeat 3 items (Leader, Season, Table)    2) Clock draw: NORMAL  3) 3 item recall: Recalls 3 objects  Results: 3 items recalled: COGNITIVE IMPAIRMENT LESS LIKELY    Mini-CogTM Copyright S Vaishali. Licensed by the author for use in [facility] [employer]; reprinted with permission (radha@[facility]). All rights reserved.      Do you have sleep apnea, excessive snoring or daytime drowsiness?: no-some snoring     Reviewed and updated as needed this visit by clinical staff   Tobacco  Allergies  Meds  Problems  Med Hx  Surg Hx  Fam Hx          Reviewed and updated as needed this visit by Provider   Tobacco  Allergies  Meds  Problems  Med Hx  Surg Hx  Fam Hx         Social History     Tobacco Use     Smoking status: Never     Smokeless tobacco: Never     Tobacco comments:     no real history   Substance Use Topics     Alcohol use: Not Currently             6/20/2023    11:52 AM   Alcohol Use   Prescreen: >3 drinks/day or >7 drinks/week? Not Applicable          No data to display              Do you have a current opioid prescription? No  Do you use any other controlled substances or medications that are not prescribed by a provider? None        Current providers sharing in care for this patient include:   Patient Care Team:  Albert Huggins MD as PCP - General (Family Practice)  Albert Huggins MD as Assigned PCP  Dejuan Felix MD as MD (Urology)  Albert Huggins,  MD as Referring Physician (Family Practice)  Lisa Celeste [facility] as Assigned [place] Pharmacist  Daniel Win MD as MD (Cardiovascular Disease)  Daniel Win MD as Assigned Heart and Vascular Provider    The following health maintenance items are reviewed in Epic and correct as of today:  Health Maintenance   Topic Date Due     ZOSTER IMMUNIZATION (2 of 3) 06/11/2012     DIABETIC FOOT EXAM  02/09/2022     DTAP/TDAP/TD IMMUNIZATION (2 - Td or Tdap) 01/11/2023     ANNUAL REVIEW OF HM ORDERS  06/14/2023     EYE EXAM  08/10/2023     A1C  12/27/2023     LIPID  01/12/2024     MICROALBUMIN  01/12/2024     COLORECTAL CANCER SCREENING  02/27/2024     BMP  03/08/2024     MEDICARE ANNUAL WELLNESS VISIT  06/27/2024     FALL RISK ASSESSMENT  06/27/2024     ADVANCE CARE PLANNING  06/27/2028     HEPATITIS C SCREENING  Completed     PHQ-2 (once per calendar year)  Completed     INFLUENZA VACCINE  Completed     Pneumococcal Vaccine: 65+ Years  Completed     COVID-19 Vaccine  Completed     IPV IMMUNIZATION  Aged Out     MENINGITIS IMMUNIZATION  Aged Out     BP Readings from Last 3 Encounters:   06/27/23 (!) 149/67   04/24/23 137/66   03/08/23 (!) 147/57    Wt Readings from Last 3 Encounters:   06/27/23 79.4 kg (175 lb)   04/24/23 82.6 kg (182 lb)   03/08/23 83.9 kg (185 lb)                  Patient Active Problem List   Diagnosis     Presbycusis     Post-nasal drainage     Heart palpitations     Seborrheic keratosis     BPH (benign prostatic hyperplasia)     Hyperlipidemia LDL goal <100     Lumbar radiculopathy     CMC arthritis, thumb, degenerative     ACP (advance care planning)     Fatigue, unspecified type     Campylobacter diarrhea     Essential hypertension with goal blood pressure less than 140/90     Benign non-nodular prostatic hyperplasia without lower urinary tract symptoms     Type 2 diabetes mellitus with hyperglycemia, without long-term current use of insulin (H)     Atypical chest pain     Actinic  keratosis     Glaucoma suspect, bilateral     Pain in joint involving pelvic region and thigh, left     Snoring     Encounter for immunization     Past Surgical History:   Procedure Laterality Date     COLONOSCOPY  1/29/14     COLONOSCOPY  1/29/2014    Procedure: COMBINED COLONOSCOPY, SINGLE BIOPSY/POLYPECTOMY BY BIOPSY;  COLONOSCOPY two polyps with jumbo forceps;  Surgeon: Alina Hopkins MD;  Location:  GI     EYE SURGERY Right 12/14/16    cataract removal      ingrown toenail         Social History     Tobacco Use     Smoking status: Never     Smokeless tobacco: Never     Tobacco comments:     no real history   Substance Use Topics     Alcohol use: Not Currently     Family History   Problem Relation Age of Onset     Cancer Mother         age 89, uterine/breast     Heart Disease Father         51         Current Outpatient Medications   Medication Sig Dispense Refill     blood glucose (NO BRAND SPECIFIED) test strip Use to test blood sugar 2 times daily or as directed. To accompany: Blood Glucose Monitor Brands: per insurance. 100 strip 11     doxazosin (CARDURA) 4 MG tablet Take 0.5 tablets (2 mg) by mouth At Bedtime 45 tablet 1     glipiZIDE (GLUCOTROL) 10 MG tablet Take 1/2 tab in am and 1 tab in  tablet 1     latanoprost (XALATAN) 0.005 % ophthalmic solution Place 1 drop into both eyes daily       metoprolol succinate ER (TOPROL XL) 25 MG 24 hr tablet Take 0.5 tablets (12.5 mg) by mouth daily 90 tablet 1     pioglitazone (ACTOS) 45 MG tablet Take 1 tablet (45 mg) by mouth daily 90 tablet 3     pravastatin (PRAVACHOL) 40 MG tablet Take 1 tablet (40 mg) by mouth daily 90 tablet 3     timolol maleate (TIMOPTIC) 0.5 % ophthalmic solution PLACE 1 DROP BOTH EYES ONCE DAILY       Allergies   Allergen Reactions     Seasonal Allergies      Pine pollen     Recent Labs   Lab Test 06/27/23  1513 03/08/23  1546 01/12/23  0756 06/14/22  0919 02/02/22  0953 05/03/21  1004 02/01/21  0749 02/10/20  0916  "11/04/19  0951 04/01/19  0931 11/05/18  1455   A1C 7.6*  --  10.6* 9.4* 7.9*   < > 7.5*   < > 7.1*   < > 10.5*   LDL  --   --  169*  --  122*  --  158*  --  160*  --  75   HDL  --   --  46  --  46  --  52  --  49  --  36*   TRIG  --   --  145  --  123  --  124  --  125  --  147   ALT  --   --   --  38  --   --   --   --  35  --  43   CR  --  1.04  --  0.92 1.02   < > 0.98  --  0.97  --  0.95   GFRESTIMATED  --  73  --  86 76   < > 74  --  76  --  77   GFRESTBLACK  --   --   --   --   --   --  86  --  88  --  >90   POTASSIUM  --  4.6  --  4.9 4.5   < > 4.4  --  4.6  --  4.7   TSH  --  3.49  --  2.43  --   --   --   --  4.34*  --  2.10    < > = values in this interval not displayed.            Review of Systems   Constitutional: Negative for chills and fever.   HENT: Negative for congestion, ear pain, hearing loss and sore throat.    Eyes: Positive for visual disturbance. Negative for pain.   Respiratory: Negative for cough and shortness of breath.    Cardiovascular: Negative for chest pain, palpitations and peripheral edema.   Gastrointestinal: Negative for abdominal pain, constipation, diarrhea, heartburn, hematochezia and nausea.   Genitourinary: Positive for frequency and urgency. Negative for dysuria, genital sores, hematuria, impotence and penile discharge.   Musculoskeletal: Negative for arthralgias, joint swelling and myalgias.   Skin: Negative for rash.   Neurological: Negative for dizziness, weakness, headaches and paresthesias.   Psychiatric/Behavioral: Negative for mood changes. The patient is not nervous/anxious.      Constitutional, HEENT, cardiovascular, pulmonary, GI, , musculoskeletal, neuro, skin, endocrine and psych systems are negative, except as otherwise noted.    OBJECTIVE:   BP (!) 149/67 (BP Location: Right arm, Patient Position: Chair, Cuff Size: Adult Regular)   Pulse 57   Temp 97.7  F (36.5  C) (Oral)   Resp 16   Ht 1.695 m (5' 6.75\")   Wt 79.4 kg (175 lb)   SpO2 96%   BMI 27.61 " "kg/m   Estimated body mass index is 27.61 kg/m  as calculated from the following:    Height as of this encounter: 1.695 m (5' 6.75\").    Weight as of this encounter: 79.4 kg (175 lb).  Physical Exam  GENERAL: alert and no distress  RESP: lungs clear to auscultation - no rales, rhonchi or wheezes  CV: regular rates and rhythm, systolic murmur 2/6  PSYCH: mentation appears normal, affect normal/bright    Diagnostic Test Results:  Labs reviewed in Epic  Results for orders placed or performed in visit on 06/27/23 (from the past 24 hour(s))   Hemoglobin A1c   Result Value Ref Range    Hemoglobin A1C 7.6 (H) 0.0 - 5.6 %       ASSESSMENT / PLAN:   (E11.65) Type 2 diabetes mellitus with hyperglycemia, without long-term current use of insulin (H)  (primary encounter diagnosis)  Comment: much improved on current regimen. To maintain. Recheck in 3 months with mtm. 6 with pcp.   Plan: glipiZIDE (GLUCOTROL) 10 MG tablet, Hemoglobin         A1c        Medication use and side effects discussed with the patient. Patient is in complete understanding and agreement with plan.         (I10) Essential hypertension with goal blood pressure less than 140/90  Comment: elevated today but out of doxazosin for weeks. Will restart and recheck bp over phone in 2 weeks. If elevated still, adjustments will need to be considered.   Plan: metoprolol succinate ER (TOPROL XL) 25 MG 24 hr        tablet, doxazosin (CARDURA) 4 MG tablet        Medication use and side effects discussed with the patient. Patient is in complete understanding and agreement with plan.       (N40.0) Benign non-nodular prostatic hyperplasia without lower urinary tract symptoms  Comment: as above   Plan: doxazosin (CARDURA) 4 MG tablet            (Z00.00) Encounter for Medicare annual wellness exam  Comment: stable wellness other than above,   Plan:     Patient has been advised of split billing requirements and indicates understanding: Yes      COUNSELING:  Reviewed preventive " "health counseling, as reflected in patient instructions       Regular exercise       Healthy diet/nutrition       Immunizations    Declined: TDAP and Zoster due to Cost              BMI:   Estimated body mass index is 27.61 kg/m  as calculated from the following:    Height as of this encounter: 1.695 m (5' 6.75\").    Weight as of this encounter: 79.4 kg (175 lb).         He reports that he has never smoked. He has never used smokeless tobacco.      Appropriate preventive services were discussed with this patient, including applicable screening as appropriate for cardiovascular disease, diabetes, osteopenia/osteoporosis, and glaucoma.  As appropriate for age/gender, discussed screening for colorectal cancer, prostate cancer, breast cancer, and cervical cancer. Checklist reviewing preventive services available has been given to the patient.    Reviewed patients plan of care and provided an AVS. The Basic Care Plan (routine screening as documented in Health Maintenance) for Kushal meets the Care Plan requirement. This Care Plan has been established and reviewed with the Patient.      Florentin Tomas PA-C  [facility]    Identified Health Risks:    I have reviewed Opioid Use Disorder and Substance Use Disorder risk factors and made any needed referrals.     "

## 2023-06-27 NOTE — PATIENT INSTRUCTIONS
Patient Education   Personalized Prevention Plan  You are due for the preventive services outlined below.  Your care team is available to assist you in scheduling these services.  If you have already completed any of these items, please share that information with your care team to update in your medical record.  Health Maintenance Due   Topic Date Due     Zoster (Shingles) Vaccine (2 of 3) 06/11/2012     Diabetic Foot Exam  02/09/2022     Diptheria Tetanus Pertussis (DTAP/TDAP/TD) Vaccine (2 - Td or Tdap) 01/11/2023     ANNUAL REVIEW OF HM ORDERS  06/14/2023       Signs of Hearing Loss  Hearing loss is a problem shared by many people. In fact, it's one of the most common health problems, particularly as people age. Most people aged 65 and older have some hearing loss. By age 80, almost everyone does. Hearing loss often occurs slowly over the years. So, you may not realize your hearing has gotten worse.   When sudden hearing loss occurs, it's important to contact your healthcare provider right away. Your provider will do a medical exam and a hearing exam as soon as possible. This is to help find the cause and type of your sudden hearing loss. Based on your diagnosis, your healthcare provider will discuss possible treatments.      Hearing much better with one ear can be a sign of hearing loss.     Have your hearing checked  Call your healthcare provider if you:     Have to strain to hear normal conversation    Have to watch other people s faces very carefully to follow what they re saying    Need to ask people to repeat what they ve said    Often misunderstand what people are saying    Turn the volume of the television or radio up so high that others complain    Feel that people are mumbling when they re talking to you    Find that the effort to hear leaves you feeling tired and irritated    Notice, when using the phone, that you hear better with one ear than the other  Stephon last reviewed this educational  content on 6/1/2022 2000-2022 [employer]. All rights reserved. This information is not intended as a substitute for professional medical care. Always follow your healthcare professional's instructions.

## 2023-08-28 ENCOUNTER — TRANSFERRED RECORDS (OUTPATIENT)
Dept: HEALTH INFORMATION MANAGEMENT | Facility: CLINIC | Age: 78
End: 2023-08-28

## 2023-08-28 LAB — RETINOPATHY: NEGATIVE

## 2023-09-05 ENCOUNTER — TELEPHONE (OUTPATIENT)
Dept: FAMILY MEDICINE | Facility: CLINIC | Age: 78
End: 2023-09-05
Payer: MEDICARE

## 2023-09-05 NOTE — TELEPHONE ENCOUNTER
Patient Quality Outreach    Patient is due for the following:   Diabetes -  Eye Exam    Next Steps:   No follow up needed at this time.    Type of outreach:    Chart review performed, no outreach needed. and patient was seen at [facility]-      Questions for provider review:    None           Amada Smith MA

## 2023-10-24 ENCOUNTER — ANCILLARY PROCEDURE (OUTPATIENT)
Dept: GENERAL RADIOLOGY | Facility: CLINIC | Age: 78
End: 2023-10-24
Attending: FAMILY MEDICINE
Payer: MEDICARE

## 2023-10-24 ENCOUNTER — OFFICE VISIT (OUTPATIENT)
Dept: FAMILY MEDICINE | Facility: CLINIC | Age: 78
End: 2023-10-24
Payer: MEDICARE

## 2023-10-24 VITALS
TEMPERATURE: 98.4 F | WEIGHT: 184 LBS | OXYGEN SATURATION: 97 % | HEIGHT: 67 IN | BODY MASS INDEX: 28.88 KG/M2 | SYSTOLIC BLOOD PRESSURE: 159 MMHG | RESPIRATION RATE: 21 BRPM | DIASTOLIC BLOOD PRESSURE: 76 MMHG | HEART RATE: 62 BPM

## 2023-10-24 DIAGNOSIS — Z23 ENCOUNTER FOR IMMUNIZATION: ICD-10-CM

## 2023-10-24 DIAGNOSIS — I10 ESSENTIAL HYPERTENSION WITH GOAL BLOOD PRESSURE LESS THAN 140/90: ICD-10-CM

## 2023-10-24 DIAGNOSIS — M79.662 PAIN OF LEFT LOWER LEG: Primary | ICD-10-CM

## 2023-10-24 DIAGNOSIS — M79.662 PAIN OF LEFT LOWER LEG: ICD-10-CM

## 2023-10-24 PROCEDURE — 99214 OFFICE O/P EST MOD 30 MIN: CPT | Performed by: FAMILY MEDICINE

## 2023-10-24 PROCEDURE — 73590 X-RAY EXAM OF LOWER LEG: CPT | Mod: TC | Performed by: RADIOLOGY

## 2023-10-24 PROCEDURE — 73552 X-RAY EXAM OF FEMUR 2/>: CPT | Mod: TC | Performed by: RADIOLOGY

## 2023-10-24 RX ORDER — RESPIRATORY SYNCYTIAL VIRUS VACCINE 120MCG/0.5
0.5 KIT INTRAMUSCULAR ONCE
Qty: 1 EACH | Refills: 0 | Status: CANCELLED | OUTPATIENT
Start: 2023-10-24 | End: 2023-10-24

## 2023-10-24 NOTE — ASSESSMENT & PLAN NOTE
Moving from a bouncing boat to a bouncing dock, 1 month ago, his L knee twisted in an unusual way.  Pain developed thereafter, no effusion.  He is able to stand, cannot descend stairs with a bent knee.  Cannot climb the stairs with involved knee.  He feels that it is unstable.  Pain is responsive to naproxen but returns.  Exam suggest ACL derangement, x-ray is noncontributory.  Discussed.  Broaden database

## 2023-10-24 NOTE — ASSESSMENT & PLAN NOTE
BP Readings from Last 6 Encounters:   10/24/23 (!) 159/76   06/27/23 (!) 149/67   04/24/23 137/66   03/08/23 (!) 147/57   01/12/23 (!) 146/82   06/14/22 128/76   Do not want diastolic below 60. Pulse is at lower limit. Pt in extremis. Monitor further

## 2023-10-24 NOTE — PROGRESS NOTES
Assessment & Plan   Problem List Items Addressed This Visit       Encounter for immunization     He proposes to get indicated vaccines at [facility] with his spouse         Essential hypertension with goal blood pressure less than 140/90     BP Readings from Last 6 Encounters:   10/24/23 (!) 159/76   06/27/23 (!) 149/67   04/24/23 137/66   03/08/23 (!) 147/57   01/12/23 (!) 146/82   06/14/22 128/76   Do not want diastolic below 60. Pulse is at lower limit. Pt in extremis. Monitor further           Pain of left lower leg - Primary     Moving from a bouncing boat to a bouncing dock, 1 month ago, his L knee twisted in an unusual way.  Pain developed thereafter, no effusion.  He is able to stand, cannot descend stairs with a bent knee.  Cannot climb the stairs with involved knee.  He feels that it is unstable.  Pain is responsive to naproxen but returns.  Exam suggest ACL derangement, x-ray is noncontributory.  Discussed.  Broaden database         Relevant Orders    XR Femur Left 2 Views    XR Tibia and Fibula Left 2 Views    MR Knee Left w/o Contrast                     Albert Huggins MD  [facility]   Kushal is a 77 year old, presenting for the following health issues:  Musculoskeletal Problem        10/24/2023     1:41 PM   Additional Questions   Roomed by Jennifer HURT CMA       History of Present Illness       Reason for visit:  Left leg injury.  Stretched left leg tendons lifting heavy items out of a boat onto a dock. Difficult to use left leg to walk up stairs, but OK to walk on level trails without pain.  Symptom onset:  More than a month  Symptoms include:  Pain and weakness in left leg.  Symptom intensity:  Moderate  Symptom progression:  Staying the same  Had these symptoms before:  No  What makes it worse:  Attempting to walk up stairs  What makes it better:  Resting left leg while sitting or lying down.    He eats 2-3 servings of fruits and vegetables daily.He consumes 0  "sweetened beverage(s) daily.He exercises with enough effort to increase his heart rate 30 to 60 minutes per day.  He exercises with enough effort to increase his heart rate 4 days per week.   He is taking medications regularly.                   Review of Systems   No bruising no falls no neuropathic symptoms      Objective    BP (!) 159/76 (BP Location: Right arm, Patient Position: Sitting, Cuff Size: Adult Large)   Pulse 62   Temp 98.4  F (36.9  C) (Oral)   Resp 21   Ht 1.702 m (5' 7\")   Wt 83.5 kg (184 lb)   SpO2 97%   BMI 28.82 kg/m    Body mass index is 28.82 kg/m .  Physical Exam     Knee without effusion.  Anterior drawer more lax than right knee no tenderness elicited.  Margins negative    X-rays with arthritis hip, knee is minimal    Albert Huggins MD                      "

## 2023-11-02 ENCOUNTER — ANCILLARY PROCEDURE (OUTPATIENT)
Dept: MRI IMAGING | Facility: CLINIC | Age: 78
End: 2023-11-02
Attending: FAMILY MEDICINE
Payer: MEDICARE

## 2023-11-02 DIAGNOSIS — M79.662 PAIN OF LEFT LOWER LEG: ICD-10-CM

## 2023-11-02 PROCEDURE — 73721 MRI JNT OF LWR EXTRE W/O DYE: CPT | Mod: LT,MG

## 2023-11-06 NOTE — RESULT ENCOUNTER NOTE
Some damage. Let's visit a bit about the next step. I will have my staff set us up as a virtual visit  Albert Huggins MD

## 2023-11-07 ENCOUNTER — VIRTUAL VISIT (OUTPATIENT)
Dept: FAMILY MEDICINE | Facility: CLINIC | Age: 78
End: 2023-11-07
Payer: MEDICARE

## 2023-11-07 DIAGNOSIS — M54.16 LUMBAR RADICULOPATHY: ICD-10-CM

## 2023-11-07 DIAGNOSIS — M79.662 PAIN OF LEFT LOWER LEG: ICD-10-CM

## 2023-11-07 DIAGNOSIS — M62.50 DENERVATION ATROPHY OF MUSCLE: Primary | ICD-10-CM

## 2023-11-07 PROCEDURE — 99213 OFFICE O/P EST LOW 20 MIN: CPT | Mod: VID | Performed by: FAMILY MEDICINE

## 2023-11-07 NOTE — ASSESSMENT & PLAN NOTE
MRI showed a variety of processes in the knee none of which would lead to instability.  Broaden database

## 2023-11-07 NOTE — ASSESSMENT & PLAN NOTE
MRI knee suggest denervation below the knee.  He describes weakness.  All pain is in the muscles below his knee in his opinion.  Broad database

## 2023-11-07 NOTE — PROGRESS NOTES
Kushal is a 78 year old who is being evaluated via a billable video visit.      How would you like to obtain your AVS? [employer]  If the video visit is dropped, the invitation should be resent by: Other e-mail: [employer]  Will anyone else be joining your video visit? Spouse will be with patient.           Assessment & Plan     Problem List Items Addressed This Visit       Denervation atrophy of muscle - Primary     MRI knee suggest denervation below the knee.  He describes weakness.  All pain is in the muscles below his knee in his opinion.  Broaden database         Relevant Orders    MR Lumbar Spine w/o & w Contrast    Adult Neurology  Referral    Lumbar radiculopathy     Diagnosed years ago.  Update database         Pain of left lower leg     MRI showed a variety of processes in the knee none of which would lead to instability.  Broaden database          We will continue nonsteroidals for his discomfort             Albert Huggins MD  [facility]   Kushal is a 78 year old, presenting for the following health issues:  Follow Up      History of Present Illness       Reason for visit:  Followup to MRI  Symptom onset:  More than a month  Symptoms include:  Left leg weakness and pain in muscles when stepping up or down stairs.  Symptom intensity:  Moderate  Symptom progression:  Staying the same  Had these symptoms before:  No  What makes it worse:  Not really...  What makes it better:  Taking an Aleve...takes about 80-90 minutes to kick in.    He eats 2-3 servings of fruits and vegetables daily.He consumes 0 sweetened beverage(s) daily.He exercises with enough effort to increase his heart rate 10 to 19 minutes per day.  He exercises with enough effort to increase his heart rate 4 days per week.   He is taking medications regularly.       Pain History:  When did you first notice your pain? More than a month   Have you seen anyone else for your pain? No  How has your pain  "affected your ability to work? Not applicable  Where in your body do you have pain?  Left leg  Patient states he has to use his left leg first when he goes down the stairs, and  going up stairs patient has to use his right left first.               Review of Systems   No falls      Objective    Vitals - Patient Reported  Systolic (Patient Reported):  (Patient was not able to take today)  Diastolic (Patient Reported):  (Patient was not able to take today)  Weight (Patient Reported): 79.4 kg (175 lb)  Height (Patient Reported): 170.2 cm (5' 7\")  BMI (Based on Pt Reported Ht/Wt): 27.41  SpO2 (Patient Reported):  (Patient was not able to take today)  Temperature (Patient Reported):  (Patient was not able to take today)  Pulse (Patient Reported):  (Patient was not able to take today)      Vitals:  No vitals were obtained today due to virtual visit.    Physical Exam   GENERAL: Healthy, alert and no distress  EYES: Eyes grossly normal to inspection.  No discharge or erythema, or obvious scleral/conjunctival abnormalities.  RESP: No audible wheeze, cough, or visible cyanosis.  No visible retractions or increased work of breathing.    SKIN: Visible skin clear. No significant rash, abnormal pigmentation or lesions.  NEURO: Cranial nerves grossly intact.  Mentation and speech appropriate for age.  PSYCH: Mentation appears normal, affect normal/bright, judgement and insight intact, normal speech and appearance well-groomed.                Video-Visit Details    Type of service:  Video Visit   Video Start Time: 5:27 PM  Video End Time:5:42 PM    Originating Location (pt. Location): Home    Distant Location (provider location):  On-site  Platform used for Video Visit: Johnny Huggins MD      "

## 2023-12-01 ENCOUNTER — HOSPITAL ENCOUNTER (OUTPATIENT)
Dept: MRI IMAGING | Facility: CLINIC | Age: 78
Discharge: HOME | End: 2023-12-01
Attending: FAMILY MEDICINE | Admitting: FAMILY MEDICINE
Payer: MEDICARE

## 2023-12-01 DIAGNOSIS — M62.50 DENERVATION ATROPHY OF MUSCLE: ICD-10-CM

## 2023-12-01 PROCEDURE — G1010 CDSM STANSON: HCPCS

## 2023-12-01 PROCEDURE — 72158 MRI LUMBAR SPINE W/O & W/DYE: CPT | Mod: MG

## 2023-12-01 PROCEDURE — 255N000002 HC RX 255 OP 636: Performed by: FAMILY MEDICINE

## 2023-12-01 PROCEDURE — A9585 GADOBUTROL INJECTION: HCPCS | Performed by: FAMILY MEDICINE

## 2023-12-01 RX ORDER — GADOBUTROL 604.72 MG/ML
9 INJECTION INTRAVENOUS ONCE
Status: COMPLETED | OUTPATIENT
Start: 2023-12-01 | End: 2023-12-01

## 2023-12-01 RX ADMIN — GADOBUTROL 9 ML: 604.72 INJECTION INTRAVENOUS at 09:09

## 2024-01-04 DIAGNOSIS — N40.0 BENIGN NON-NODULAR PROSTATIC HYPERPLASIA WITHOUT LOWER URINARY TRACT SYMPTOMS: ICD-10-CM

## 2024-01-04 DIAGNOSIS — E11.65 TYPE 2 DIABETES MELLITUS WITH HYPERGLYCEMIA, WITHOUT LONG-TERM CURRENT USE OF INSULIN (H): Primary | ICD-10-CM

## 2024-01-04 DIAGNOSIS — I10 ESSENTIAL HYPERTENSION WITH GOAL BLOOD PRESSURE LESS THAN 140/90: ICD-10-CM

## 2024-01-04 RX ORDER — DOXAZOSIN 4 MG/1
2 TABLET ORAL AT BEDTIME
Qty: 45 TABLET | Refills: 0 | Status: SHIPPED | OUTPATIENT
Start: 2024-01-04 | End: 2024-02-23

## 2024-01-04 RX ORDER — PIOGLITAZONEHYDROCHLORIDE 45 MG/1
45 TABLET ORAL DAILY
Qty: 90 TABLET | Refills: 0 | Status: SHIPPED | OUTPATIENT
Start: 2024-01-04 | End: 2024-04-16

## 2024-02-03 ENCOUNTER — E-VISIT (OUTPATIENT)
Dept: FAMILY MEDICINE | Facility: CLINIC | Age: 79
End: 2024-02-03
Payer: MEDICARE

## 2024-02-03 DIAGNOSIS — J00 CORYZA: Primary | ICD-10-CM

## 2024-02-03 PROCEDURE — 99207 PR NON-BILLABLE SERV PER CHARTING: CPT | Performed by: FAMILY MEDICINE

## 2024-02-04 ENCOUNTER — E-VISIT (OUTPATIENT)
Dept: FAMILY MEDICINE | Facility: CLINIC | Age: 79
End: 2024-02-04
Payer: MEDICARE

## 2024-02-04 DIAGNOSIS — U07.1 COVID: Primary | ICD-10-CM

## 2024-02-04 PROCEDURE — 99207 PR NON-BILLABLE SERV PER CHARTING: CPT | Performed by: FAMILY MEDICINE

## 2024-02-05 ENCOUNTER — VIRTUAL VISIT (OUTPATIENT)
Dept: FAMILY MEDICINE | Facility: CLINIC | Age: 79
End: 2024-02-05
Payer: MEDICARE

## 2024-02-05 DIAGNOSIS — E11.65 TYPE 2 DIABETES MELLITUS WITH HYPERGLYCEMIA, WITHOUT LONG-TERM CURRENT USE OF INSULIN (H): ICD-10-CM

## 2024-02-05 DIAGNOSIS — I10 ESSENTIAL HYPERTENSION WITH GOAL BLOOD PRESSURE LESS THAN 140/90: ICD-10-CM

## 2024-02-05 DIAGNOSIS — U07.1 INFECTION DUE TO 2019 NOVEL CORONAVIRUS: Primary | ICD-10-CM

## 2024-02-05 PROCEDURE — 99443 PR PHYSICIAN TELEPHONE EVALUATION 21-30 MIN: CPT | Mod: 93 | Performed by: FAMILY MEDICINE

## 2024-02-05 NOTE — PATIENT INSTRUCTIONS
Thank you for choosing us for your care. Based on the information provided, I believe you need to visit immediately.    Albert Huggins MD

## 2024-02-05 NOTE — PROGRESS NOTES
"Kushal is a 78 year old who is being evaluated via a billable telephone visit.      What phone number would you like to be contacted at? [phone]  How would you like to obtain your AVS? Mail a copy    Distant Location (provider location):  Off-site    Assessment & Plan     (U07.1) Infection due to 2019 novel coronavirus  (primary encounter diagnosis)  Comment: want to try paxlovid again , as it work well for him previously    Plan: nirmatrelvir and ritonavir (PAXLOVID) 300         mg/100 mg therapy pack            (E11.65) Type 2 diabetes mellitus with hyperglycemia, without long-term current use of insulin (H)  Comment:  Plan: per pt he is doing ok  . But he is due for follow up - so he will schedule follow up check soon.     (I10) Essential hypertension with goal blood pressure less than 140/90  Comment:   Plan:   BP not in adequate control.  per chart review - noted last BP was elevated   Discussed cares,and need to watch BP closely.  encouraged home BP monitoring. Follow up with PCP  if problem.       Script  sent.Cares and  treatment discussed.  Follow up if problem   Patient expressed understanding and agreement with treatment plan. All patient's questions were answered, will let me know if has more later.  Medications: Rx's: Reviewed the potential side effects/complications of medications prescribed.         BMI  Estimated body mass index is 28.82 kg/m  as calculated from the following:    Height as of 10/24/23: 1.702 m (5' 7\").    Weight as of 10/24/23: 83.5 kg (184 lb).         See Patient Instructions    Subjective   Kushal is a 78 year old, presenting for the following health issues:  Covid Concern        2/5/2024    10:19 AM   Additional Questions   Roomed by Ria- Unable to reach     [facility]     COVID-19 Symptom     Acute Illness  Acute illness concerns: x 3 day , had positive Covid test at home   Onset/Duration: 3 days - Friday night   Symptoms:  Fever: YES, felt feverish but did not check with " thermometer , but was hit and chilled fir couple of day but better today , tylenol helps   Chills/Sweats: YES  Headache (location?): No  Sinus Pressure: YES, mil sx   Conjunctivitis:  No  Ear Pain: no  Rhinorrhea: No  Congestion: YES, n but nor much chest congestion   Sore Throat: YES  Cough: YES-very mild cough on and off   Wheeze: No  Decreased Appetite: No  Nausea: No  Vomiting: No  Diarrhea: No  Dysuria/Freq.: No  Dysuria or Hematuria: No  Fatigue/Achiness: No  Sick/Strep Exposure: YES- , but was in [facility] with sick pt possible Covid .   He had Covid before and took paxlovid and helped greatly previously so wants to try again     Therapies tried and outcome: OTC tylenol helps some   Does patient live in a nursing home, group home, or shelter? No  Does patient have a way to get food/medications during quarantined? Yes, I have a friend or family member who can help me.            Review of Systems  Constitutional, HEENT, cardiovascular, pulmonary, GI, , musculoskeletal, neuro, skin, endocrine and psych systems are negative, except as otherwise noted.      Objective           Vitals:  No vitals were obtained today due to virtual visit.    Physical Exam   General: Alert and no distress , pleasant   Respiratory: breathing looks comfortable No audible wheeze, cough, or shortness of breath // Psychiatric:  Appropriate affect, tone, and pace of words          Phone call duration: 35  minutes  Signed Electronically by: Susana Valadez MD

## 2024-02-17 ENCOUNTER — HEALTH MAINTENANCE LETTER (OUTPATIENT)
Age: 79
End: 2024-02-17

## 2024-02-23 ENCOUNTER — VIRTUAL VISIT (OUTPATIENT)
Dept: FAMILY MEDICINE | Facility: CLINIC | Age: 79
End: 2024-02-23
Payer: MEDICARE

## 2024-02-23 DIAGNOSIS — I10 ESSENTIAL HYPERTENSION WITH GOAL BLOOD PRESSURE LESS THAN 140/90: ICD-10-CM

## 2024-02-23 DIAGNOSIS — E11.65 TYPE 2 DIABETES MELLITUS WITH HYPERGLYCEMIA, WITHOUT LONG-TERM CURRENT USE OF INSULIN (H): ICD-10-CM

## 2024-02-23 DIAGNOSIS — E78.5 HYPERLIPIDEMIA LDL GOAL <100: ICD-10-CM

## 2024-02-23 DIAGNOSIS — E11.65 TYPE 2 DIABETES MELLITUS WITH HYPERGLYCEMIA, WITHOUT LONG-TERM CURRENT USE OF INSULIN (H): Primary | ICD-10-CM

## 2024-02-23 DIAGNOSIS — M79.662 PAIN OF LEFT LOWER LEG: ICD-10-CM

## 2024-02-23 DIAGNOSIS — N40.0 BENIGN NON-NODULAR PROSTATIC HYPERPLASIA WITHOUT LOWER URINARY TRACT SYMPTOMS: ICD-10-CM

## 2024-02-23 PROCEDURE — 99214 OFFICE O/P EST MOD 30 MIN: CPT | Mod: 95 | Performed by: PHYSICIAN ASSISTANT

## 2024-02-23 RX ORDER — METOPROLOL SUCCINATE 25 MG/1
12.5 TABLET, EXTENDED RELEASE ORAL DAILY
Qty: 90 TABLET | Refills: 1 | Status: SHIPPED | OUTPATIENT
Start: 2024-02-23 | End: 2024-07-30

## 2024-02-23 RX ORDER — LISINOPRIL 5 MG/1
5 TABLET ORAL DAILY
Qty: 90 TABLET | Refills: 1 | Status: SHIPPED | OUTPATIENT
Start: 2024-02-23 | End: 2024-07-30

## 2024-02-23 RX ORDER — GLIPIZIDE 10 MG/1
TABLET ORAL
Qty: 90 TABLET | Refills: 1 | Status: SHIPPED | OUTPATIENT
Start: 2024-02-23 | End: 2024-05-14

## 2024-02-23 RX ORDER — PRAVASTATIN SODIUM 40 MG
40 TABLET ORAL DAILY
Qty: 90 TABLET | Refills: 3 | Status: SHIPPED | OUTPATIENT
Start: 2024-02-23 | End: 2024-07-30

## 2024-02-23 RX ORDER — DOXAZOSIN 4 MG/1
2 TABLET ORAL AT BEDTIME
Qty: 45 TABLET | Refills: 0 | Status: SHIPPED | OUTPATIENT
Start: 2024-02-23 | End: 2024-05-20

## 2024-02-23 SDOH — HEALTH STABILITY: PHYSICAL HEALTH: ON AVERAGE, HOW MANY MINUTES DO YOU ENGAGE IN EXERCISE AT THIS LEVEL?: 30 MIN

## 2024-02-23 SDOH — HEALTH STABILITY: PHYSICAL HEALTH: ON AVERAGE, HOW MANY DAYS PER WEEK DO YOU ENGAGE IN MODERATE TO STRENUOUS EXERCISE (LIKE A BRISK WALK)?: 5 DAYS

## 2024-02-23 ASSESSMENT — SOCIAL DETERMINANTS OF HEALTH (SDOH)
HOW OFTEN DO YOU ATTEND CHURCH OR RELIGIOUS SERVICES?: MORE THAN 4 TIMES PER YEAR
DO YOU BELONG TO ANY CLUBS OR ORGANIZATIONS SUCH AS CHURCH GROUPS UNIONS, FRATERNAL OR ATHLETIC GROUPS, OR SCHOOL GROUPS?: YES
HOW OFTEN DO YOU GET TOGETHER WITH FRIENDS OR RELATIVES?: TWICE A WEEK
HOW OFTEN DO YOU ATTENT MEETINGS OF THE CLUB OR ORGANIZATION YOU BELONG TO?: MORE THAN 4 TIMES PER YEAR
HOW OFTEN DO YOU GET TOGETHER WITH FRIENDS OR RELATIVES?: TWICE A WEEK
IN A TYPICAL WEEK, HOW MANY TIMES DO YOU TALK ON THE PHONE WITH FAMILY, FRIENDS, OR NEIGHBORS?: THREE TIMES A WEEK

## 2024-02-23 ASSESSMENT — LIFESTYLE VARIABLES
HOW OFTEN DO YOU HAVE A DRINK CONTAINING ALCOHOL: NEVER
HOW OFTEN DO YOU HAVE SIX OR MORE DRINKS ON ONE OCCASION: NEVER
SKIP TO QUESTIONS 9-10: 1
AUDIT-C TOTAL SCORE: 0
HOW MANY STANDARD DRINKS CONTAINING ALCOHOL DO YOU HAVE ON A TYPICAL DAY: PATIENT DOES NOT DRINK

## 2024-02-23 NOTE — PROGRESS NOTES
Kushal is a 78 year old who is being evaluated via a billable video visit.      How would you like to obtain your AVS? MyChart  If the video visit is dropped, the invitation should be resent by: Text to cell phone: [phone]  Will anyone else be joining your video visit? No          Assessment & Plan     Type 2 diabetes mellitus with hyperglycemia, without long-term current use of insulin (H)  Improved sugar levels at home. Has reduced glipizide to 10 mg daily. Will continue and recheck labs in 1  month with bp  - Hemoglobin A1c; Future  - Lipid panel reflex to direct LDL Fasting; Future  - Comprehensive metabolic panel (BMP + Alb, Alk Phos, ALT, AST, Total. Bili, TP); Future  - Albumin Random Urine Quantitative with Creat Ratio; Future  - glipiZIDE (GLUCOTROL) 10 MG tablet; Take 1/2 tab in am and 1 tab in PM    Essential hypertension with goal blood pressure less than 140/90  As above. Uncontrolled . Unclear why ace was stopped in past. Will restarta lisinopril 5 mg and recheck in 1 month with labs.   - Comprehensive metabolic panel (BMP + Alb, Alk Phos, ALT, AST, Total. Bili, TP); Future  - metoprolol succinate ER (TOPROL XL) 25 MG 24 hr tablet; Take 0.5 tablets (12.5 mg) by mouth daily  - lisinopril (ZESTRIL) 5 MG tablet; Take 1 tablet (5 mg) by mouth daily  - doxazosin (CARDURA) 4 MG tablet; Take 0.5 tablets (2 mg) by mouth at bedtime  Medication use and side effects discussed with the patient. Patient is in complete understanding and agreement with plan.     Hyperlipidemia LDL goal <100  Labs due. Lab only pending. Stable on pravasttain.   - pravastatin (PRAVACHOL) 40 MG tablet; Take 1 tablet (40 mg) by mouth daily  Medication use and side effects discussed with the patient. Patient is in complete understanding and agreement with plan.     Benign non-nodular prostatic hyperplasia without lower urinary tract symptoms  Worsening symptoms per patient. Given age and high risk of falls with glipizide as well,  would recommend consulting with urology over further dose or medication change. He will  consider this. Denies for now.   - doxazosin (CARDURA) 4 MG tablet; Take 0.5 tablets (2 mg) by mouth at bedtime  Medication use and side effects discussed with the patient. Patient is in complete understanding and agreement with plan.     Pain of left lower leg  Patient states symptoms have resolved. Will montior this for now.             Agustin Fatima is a 78 year old, presenting for the following health issues:  Recheck Medication        2/23/2024    9:19 AM   Additional Questions   Roomed by Flavia Stout     [facility]     Diabetes Follow-up    Diabetes Follow-up    How often are you checking your blood sugar? One time daily  What time of day are you checking your blood sugars (select all that apply)?  Before meals  Have you had any blood sugars above 200?  No  Have you had any blood sugars below 70?  No-though down to 76 at times  What symptoms do you notice when your blood sugar is low?  None  What concerns do you have today about your diabetes? None   Do you have any of these symptoms? (Select all that apply)  No numbness or tingling in feet.  No redness, sores or blisters on feet.  No complaints of excessive thirst.  No reports of blurry vision.  No significant changes to weight.      BP Readings from Last 2 Encounters:   10/24/23 (!) 159/76   06/27/23 (!) 149/67     Hemoglobin A1C (%)   Date Value   06/27/2023 7.6 (H)   01/12/2023 10.6 (H)   05/03/2021 7.8 (H)   02/01/2021 7.5 (H)     LDL Cholesterol Calculated (mg/dL)   Date Value   01/12/2023 169 (H)   02/02/2022 122 (H)   02/01/2021 158 (H)   11/04/2019 160 (H)         Hyperlipidemia Follow-Up    Are you regularly taking any medication or supplement to lower your cholesterol?   Yes- statin  Are you having muscle aches or other side effects that you think could be caused by your cholesterol lowering medication?  No     Hypertension Follow-up    Do you check your  "blood pressure regularly outside of the clinic? Yes   Are you following a low salt diet? Yes  Are your blood pressures ever more than 140 on the top number (systolic) OR more   than 90 on the bottom number (diastolic), for example 140/90? Yes     Medication Followup of doxazosin 4 mg    Taking Medication as prescribed: yes  Side Effects:  None  Medication Helping Symptoms:  yes    Years ago was considering TURP but was postponed due to uncontrolled diabetes, but feels his urination symptoms are progressive      BP Readings from Last 2 Encounters:   10/24/23 (!) 159/76   06/27/23 (!) 149/67     Hemoglobin A1C (%)   Date Value   06/27/2023 7.6 (H)   01/12/2023 10.6 (H)   05/03/2021 7.8 (H)   02/01/2021 7.5 (H)     LDL Cholesterol Calculated (mg/dL)   Date Value   01/12/2023 169 (H)   02/02/2022 122 (H)   02/01/2021 158 (H)   11/04/2019 160 (H)                 Objective    Vitals - Patient Reported  Weight (Patient Reported): 77.1 kg (170 lb)  Height (Patient Reported): 170.2 cm (5' 7\")  BMI (Based on Pt Reported Ht/Wt): 26.63  Pain Score: No Pain (0)        Physical Exam   GENERAL: alert and no distress  EYES: Eyes grossly normal to inspection.  No discharge or erythema, or obvious scleral/conjunctival abnormalities.  RESP: No audible wheeze, cough, or visible cyanosis.    SKIN: Visible skin clear. No significant rash, abnormal pigmentation or lesions.  NEURO: Cranial nerves grossly intact.  Mentation and speech appropriate for age.  PSYCH: Appropriate affect, tone, and pace of words          Video-Visit Details    Type of service:  Video Visit     Originating Location (pt. Location): Home    Distant Location (provider location):  Off-site  Platform used for Video Visit: Johnny  Signed Electronically by: Florentin Tomas PA-C    "

## 2024-02-23 NOTE — PROGRESS NOTES
Preventive Care Visit  [facility]  Florentin Tomas PA-C, Family Medicine  Feb 23, 2024  {Provider  Link to SmartSet :973120}  {PROVIDER CHARTING PREFERENCE:292997}    Agustin Fatima is a 78 year old, presenting for the following:  Annual Visit    {(!) Visit Details have not yet been documented.  Please enter Visit Details and then use this list to pull in documentation. (Optional):609710}{ROOMER positive Fall Risk- Gait Speed Test is required click here to document the Gait Speed Test and then refresh the note to pull in results  :222700}  {ROOMER if patient is in their first year of Medicare a vision screen is required click here to document the Vison screen and then refresh the note to pull in results  :458499}    Health Care Directive  Patient has a Health Care Directive on file  {(AWV REQUIRED) Advanced Care Planning Reviewed:489141}    HPI  ***  {SUPERLIST (Optional):870278}  {additonal problems for provider to add (Optional):771787}      2/23/2024   General Health   How would you rate your overall physical health? Good   Feel stress (tense, anxious, or unable to sleep) Not at all    Not at all         2/23/2024   Nutrition   Diet: Diabetic    Carbohydrate counting         2/23/2024   Exercise   Days per week of moderate/strenous exercise 5 days    5 days   Average minutes spent exercising at this level 30 min    30 min         2/23/2024   Social Factors   Frequency of gathering with friends or relatives Twice a week    Twice a week   Worry food won't last until get money to buy more No    No   Food not last or not have enough money for food? No    No   Do you have housing?  Yes    Yes   Are you worried about losing your housing? No    No   Lack of transportation? No    No   Unable to get utilities (heat,electricity)? No    No         2/23/2024   Fall Risk   Fallen 2 or more times in the past year? No   Trouble with walking or balance? Yes     {Positive Fall Risk- Gait  Speed Test is required and was not documented before note was started.  If results do not appear, ask staff to complete.  Once completed, refresh note to pull in results Click here to link Gait Speed Test  :054112}      2/23/2024   Activities of Daily Living- Home Safety   Needs help with the following daily activites None of the above   Safety concerns in the home None of the above         2/23/2024   Dental   Dentist two times every year? Yes         2/23/2024   Hearing Screening   Hearing concerns? (!) TROUBLE UNDERSTANDING SOFT OR WHISPERED SPEECH.         2/23/2024   Driving Risk Screening   Patient/family members have concerns about driving No         2/23/2024   General Alertness/Fatigue Screening   Have you been more tired than usual lately? (!) YES         2/23/2024   Urinary Incontinence Screening   Bothered by leaking urine in past 6 months Yes         2/23/2024   TB Screening   Were you born outside of [place]?  No         Today's PHQ-2 Score:       2/23/2024     8:09 AM   PHQ-2 ( 1999 Pf[employer])   Q1: Little interest or pleasure in doing things 0   Q2: Feeling down, depressed or hopeless 0   PHQ-2 Score 0   Q1: Little interest or pleasure in doing things Not at all   Q2: Feeling down, depressed or hopeless Not at all   PHQ-2 Score 0           2/23/2024   Substance Use   Alcohol more than 3/day or more than 7/wk No   Do you have a current opioid prescription? No   How severe/bad is pain from 1 to 10? 0/10 (No Pain)   Do you use any other substances recreationally? No     Social History     Tobacco Use    Smoking status: Never    Smokeless tobacco: Never    Tobacco comments:     no real history   Vaping Use    Vaping Use: Never used   Substance Use Topics    Alcohol use: Not Currently    Drug use: No     {Provider  If there are gaps in the social history shown above, please follow the link to update and then refresh the note Link to Social and Substance History :880631}  ASCVD Risk   The 10-year ASCVD  risk score (Lilliana HERNANDEZ, et al., 2019) is: 73.4%    Values used to calculate the score:      Age: 78 years      Sex: Male      Is Non- : No      Diabetic: Yes      Tobacco smoker: No      Systolic Blood Pressure: 159 mmHg      Is BP treated: Yes      HDL Cholesterol: 46 mg/dL      Total Cholesterol: 244 mg/dL    {Link to Fracture Risk Assessment Tool (Optional):060059}    {Provider  Use the storyboard to review patient history, after sections have been marked as reviewed, refresh note to capture documentation:812311}  {Provider   REQUIRED AWV use this link to review and update sexual activity history  after section has been marked as reviewed, refresh note to capture documentation:187594}  Reviewed and updated as needed this visit by Provider                    {HISTORY OPTIONS (Optional):009511}  Current providers sharing in care for this patient include:  Patient Care Team:  Albert Huggins MD as PCP - General (Family Practice)  Albert Huggins MD as Assigned PCP  Dejuan Felix MD as MD (Urology)  Albert Huggins MD as Referring Physician (Family Practice)  Daniel Win MD as MD (Cardiovascular Disease)  Daniel Win MD as Assigned Heart and Vascular Provider    The following health maintenance items are reviewed in Epic and correct as of today:  Health Maintenance   Topic Date Due    RSV VACCINE (Pregnancy & 60+) (1 - 1-dose 60+ series) Never done    DIABETIC FOOT EXAM  02/09/2022    DTAP/TDAP/TD IMMUNIZATION (2 - Td or Tdap) 01/11/2023    A1C  12/27/2023    LIPID  01/12/2024    MICROALBUMIN  01/12/2024    COLORECTAL CANCER SCREENING  02/27/2024    BMP  03/08/2024    MEDICARE ANNUAL WELLNESS VISIT  06/27/2024    EYE EXAM  08/28/2024    ANNUAL REVIEW OF HM ORDERS  10/24/2024    FALL RISK ASSESSMENT  02/23/2025    ADVANCE CARE PLANNING  06/27/2028    HEPATITIS C SCREENING  Completed    PHQ-2 (once per calendar year)  Completed    INFLUENZA VACCINE  Completed     "Pneumococcal Vaccine: 65+ Years  Completed    ZOSTER IMMUNIZATION  Completed    COVID-19 Vaccine  Completed    IPV IMMUNIZATION  Aged Out    HPV IMMUNIZATION  Aged Out    MENINGITIS IMMUNIZATION  Aged Out    RSV MONOCLONAL ANTIBODY  Aged Out       {ROS Picklists (Optional):653283}     Objective    Exam  There were no vitals taken for this visit.   Estimated body mass index is 28.82 kg/m  as calculated from the following:    Height as of 10/24/23: 1.702 m (5' 7\").    Weight as of 10/24/23: 83.5 kg (184 lb).    Physical Exam  {Exam Choices (Optional):309006}  {Provider  The Mini-Cog is incomplete, use link to complete and refresh note Link to Mini-Cog :115339}       No data to display              {A Mini-Cog total score of 0-2 suggests the possibility of dementia, score of 3-5 suggests no dementia:035925}       Signed Electronically by: Florentin Tomas PA-C  {Email feedback regarding this note to [email or web address]   :322540}  "

## 2024-02-26 RX ORDER — GLIPIZIDE 10 MG/1
TABLET ORAL
Qty: 135 TABLET | OUTPATIENT
Start: 2024-02-26

## 2024-03-22 ENCOUNTER — LAB (OUTPATIENT)
Dept: LAB | Facility: CLINIC | Age: 79
End: 2024-03-22
Payer: MEDICARE

## 2024-03-22 DIAGNOSIS — E11.65 TYPE 2 DIABETES MELLITUS WITH HYPERGLYCEMIA, WITHOUT LONG-TERM CURRENT USE OF INSULIN (H): ICD-10-CM

## 2024-03-22 DIAGNOSIS — I10 ESSENTIAL HYPERTENSION WITH GOAL BLOOD PRESSURE LESS THAN 140/90: ICD-10-CM

## 2024-03-22 LAB
ALBUMIN SERPL BCG-MCNC: 4.1 G/DL (ref 3.5–5.2)
ALP SERPL-CCNC: 62 U/L (ref 40–150)
ALT SERPL W P-5'-P-CCNC: 29 U/L (ref 0–70)
ANION GAP SERPL CALCULATED.3IONS-SCNC: 10 MMOL/L (ref 7–15)
AST SERPL W P-5'-P-CCNC: 30 U/L (ref 0–45)
BILIRUB SERPL-MCNC: 0.4 MG/DL
BUN SERPL-MCNC: 28.5 MG/DL (ref 8–23)
CALCIUM SERPL-MCNC: 9.3 MG/DL (ref 8.8–10.2)
CHLORIDE SERPL-SCNC: 106 MMOL/L (ref 98–107)
CHOLEST SERPL-MCNC: 128 MG/DL
CREAT SERPL-MCNC: 0.94 MG/DL (ref 0.67–1.17)
CREAT UR-MCNC: 129 MG/DL
DEPRECATED HCO3 PLAS-SCNC: 26 MMOL/L (ref 22–29)
EGFRCR SERPLBLD CKD-EPI 2021: 82 ML/MIN/1.73M2
FASTING STATUS PATIENT QL REPORTED: YES
GLUCOSE SERPL-MCNC: 125 MG/DL (ref 70–99)
HBA1C MFR BLD: 7.1 % (ref 0–5.6)
HDLC SERPL-MCNC: 42 MG/DL
LDLC SERPL CALC-MCNC: 66 MG/DL
MICROALBUMIN UR-MCNC: <12 MG/L
MICROALBUMIN/CREAT UR: NORMAL MG/G{CREAT}
NONHDLC SERPL-MCNC: 86 MG/DL
POTASSIUM SERPL-SCNC: 4.4 MMOL/L (ref 3.4–5.3)
PROT SERPL-MCNC: 6.8 G/DL (ref 6.4–8.3)
SODIUM SERPL-SCNC: 142 MMOL/L (ref 135–145)
TRIGL SERPL-MCNC: 100 MG/DL

## 2024-03-22 PROCEDURE — 82570 ASSAY OF URINE CREATININE: CPT

## 2024-03-22 PROCEDURE — 80061 LIPID PANEL: CPT

## 2024-03-22 PROCEDURE — 82043 UR ALBUMIN QUANTITATIVE: CPT

## 2024-03-22 PROCEDURE — 36415 COLL VENOUS BLD VENIPUNCTURE: CPT

## 2024-03-22 PROCEDURE — 83036 HEMOGLOBIN GLYCOSYLATED A1C: CPT

## 2024-03-22 PROCEDURE — 80053 COMPREHEN METABOLIC PANEL: CPT

## 2024-04-16 DIAGNOSIS — E11.65 TYPE 2 DIABETES MELLITUS WITH HYPERGLYCEMIA, WITHOUT LONG-TERM CURRENT USE OF INSULIN (H): ICD-10-CM

## 2024-04-16 RX ORDER — PIOGLITAZONEHYDROCHLORIDE 45 MG/1
45 TABLET ORAL DAILY
Qty: 90 TABLET | Refills: 1 | Status: SHIPPED | OUTPATIENT
Start: 2024-04-16 | End: 2024-07-30

## 2024-05-14 DIAGNOSIS — E11.65 TYPE 2 DIABETES MELLITUS WITH HYPERGLYCEMIA, WITHOUT LONG-TERM CURRENT USE OF INSULIN (H): ICD-10-CM

## 2024-05-14 NOTE — TELEPHONE ENCOUNTER
Incoming fax from pharmacy stating that patient said he takes one 1 tablet of 10 mg per day, please send order with current direction.

## 2024-05-15 RX ORDER — GLIPIZIDE 10 MG/1
TABLET ORAL
Qty: 90 TABLET | Refills: 0 | Status: SHIPPED | OUTPATIENT
Start: 2024-05-15 | End: 2024-07-30

## 2024-05-19 DIAGNOSIS — N40.0 BENIGN NON-NODULAR PROSTATIC HYPERPLASIA WITHOUT LOWER URINARY TRACT SYMPTOMS: ICD-10-CM

## 2024-05-19 DIAGNOSIS — I10 ESSENTIAL HYPERTENSION WITH GOAL BLOOD PRESSURE LESS THAN 140/90: ICD-10-CM

## 2024-05-20 RX ORDER — DOXAZOSIN 4 MG/1
TABLET ORAL
Qty: 45 TABLET | Refills: 1 | Status: SHIPPED | OUTPATIENT
Start: 2024-05-20 | End: 2024-07-30

## 2024-05-28 ENCOUNTER — PATIENT OUTREACH (OUTPATIENT)
Dept: CARE COORDINATION | Facility: CLINIC | Age: 79
End: 2024-05-28
Payer: MEDICARE

## 2024-06-11 ENCOUNTER — PATIENT OUTREACH (OUTPATIENT)
Dept: CARE COORDINATION | Facility: CLINIC | Age: 79
End: 2024-06-11
Payer: MEDICARE

## 2024-07-29 SDOH — HEALTH STABILITY: PHYSICAL HEALTH: ON AVERAGE, HOW MANY MINUTES DO YOU ENGAGE IN EXERCISE AT THIS LEVEL?: 60 MIN

## 2024-07-29 SDOH — HEALTH STABILITY: PHYSICAL HEALTH: ON AVERAGE, HOW MANY DAYS PER WEEK DO YOU ENGAGE IN MODERATE TO STRENUOUS EXERCISE (LIKE A BRISK WALK)?: 4 DAYS

## 2024-07-29 ASSESSMENT — SOCIAL DETERMINANTS OF HEALTH (SDOH): HOW OFTEN DO YOU GET TOGETHER WITH FRIENDS OR RELATIVES?: ONCE A WEEK

## 2024-07-30 ENCOUNTER — OFFICE VISIT (OUTPATIENT)
Dept: FAMILY MEDICINE | Facility: CLINIC | Age: 79
End: 2024-07-30
Payer: MEDICARE

## 2024-07-30 ENCOUNTER — TELEPHONE (OUTPATIENT)
Dept: FAMILY MEDICINE | Facility: CLINIC | Age: 79
End: 2024-07-30

## 2024-07-30 VITALS
OXYGEN SATURATION: 96 % | RESPIRATION RATE: 17 BRPM | HEIGHT: 67 IN | HEART RATE: 54 BPM | DIASTOLIC BLOOD PRESSURE: 65 MMHG | BODY MASS INDEX: 27.37 KG/M2 | TEMPERATURE: 97.9 F | WEIGHT: 174.4 LBS | SYSTOLIC BLOOD PRESSURE: 123 MMHG

## 2024-07-30 DIAGNOSIS — E11.65 TYPE 2 DIABETES MELLITUS WITH HYPERGLYCEMIA, WITHOUT LONG-TERM CURRENT USE OF INSULIN (H): ICD-10-CM

## 2024-07-30 DIAGNOSIS — E78.5 HYPERLIPIDEMIA LDL GOAL <100: ICD-10-CM

## 2024-07-30 DIAGNOSIS — M62.50 DENERVATION ATROPHY OF MUSCLE: ICD-10-CM

## 2024-07-30 DIAGNOSIS — I10 ESSENTIAL HYPERTENSION WITH GOAL BLOOD PRESSURE LESS THAN 140/90: ICD-10-CM

## 2024-07-30 DIAGNOSIS — Z12.11 SCREEN FOR COLON CANCER: ICD-10-CM

## 2024-07-30 DIAGNOSIS — Z00.00 ENCOUNTER FOR MEDICARE ANNUAL WELLNESS EXAM: Primary | ICD-10-CM

## 2024-07-30 DIAGNOSIS — N40.0 BENIGN NON-NODULAR PROSTATIC HYPERPLASIA WITHOUT LOWER URINARY TRACT SYMPTOMS: ICD-10-CM

## 2024-07-30 PROBLEM — R07.89 ATYPICAL CHEST PAIN: Status: RESOLVED | Noted: 2020-01-01 | Resolved: 2024-07-30

## 2024-07-30 PROBLEM — H40.2231 CHRONIC PRIMARY ANGLE-CLOSURE GLAUCOMA OF BOTH EYES, MILD STAGE: Status: ACTIVE | Noted: 2021-02-09

## 2024-07-30 PROBLEM — M25.552 PAIN IN JOINT INVOLVING PELVIC REGION AND THIGH, LEFT: Status: RESOLVED | Noted: 2022-06-14 | Resolved: 2024-07-30

## 2024-07-30 PROBLEM — Z23 ENCOUNTER FOR IMMUNIZATION: Status: RESOLVED | Noted: 2022-06-14 | Resolved: 2024-07-30

## 2024-07-30 PROBLEM — M79.662 PAIN OF LEFT LOWER LEG: Status: RESOLVED | Noted: 2023-10-24 | Resolved: 2024-07-30

## 2024-07-30 LAB — HBA1C MFR BLD: 8 % (ref 0–5.6)

## 2024-07-30 PROCEDURE — 99214 OFFICE O/P EST MOD 30 MIN: CPT | Mod: 25 | Performed by: FAMILY MEDICINE

## 2024-07-30 PROCEDURE — 36415 COLL VENOUS BLD VENIPUNCTURE: CPT | Performed by: FAMILY MEDICINE

## 2024-07-30 PROCEDURE — 83036 HEMOGLOBIN GLYCOSYLATED A1C: CPT | Performed by: FAMILY MEDICINE

## 2024-07-30 PROCEDURE — G0439 PPPS, SUBSEQ VISIT: HCPCS | Performed by: FAMILY MEDICINE

## 2024-07-30 RX ORDER — DOXAZOSIN 4 MG/1
4 TABLET ORAL AT BEDTIME
Qty: 90 TABLET | Refills: 3 | Status: SHIPPED | OUTPATIENT
Start: 2024-07-30

## 2024-07-30 RX ORDER — METOPROLOL SUCCINATE 25 MG/1
12.5 TABLET, EXTENDED RELEASE ORAL DAILY
Qty: 90 TABLET | Refills: 3 | Status: SHIPPED | OUTPATIENT
Start: 2024-07-30

## 2024-07-30 RX ORDER — PIOGLITAZONEHYDROCHLORIDE 45 MG/1
45 TABLET ORAL DAILY
Qty: 90 TABLET | Refills: 3 | Status: SHIPPED | OUTPATIENT
Start: 2024-07-30

## 2024-07-30 RX ORDER — LISINOPRIL 5 MG/1
5 TABLET ORAL DAILY
Qty: 90 TABLET | Refills: 3 | Status: SHIPPED | OUTPATIENT
Start: 2024-07-30

## 2024-07-30 RX ORDER — PRAVASTATIN SODIUM 40 MG
40 TABLET ORAL DAILY
Qty: 90 TABLET | Refills: 3 | Status: SHIPPED | OUTPATIENT
Start: 2024-07-30

## 2024-07-30 RX ORDER — GLIPIZIDE 10 MG/1
TABLET ORAL
Qty: 90 TABLET | Refills: 3 | Status: SHIPPED | OUTPATIENT
Start: 2024-07-30

## 2024-07-30 NOTE — ASSESSMENT & PLAN NOTE
Pt is still struggling with symptoms of urgency and sometimes inability to urinate, and he is wondering if he can increase the dose of Doxazosin to 4 mg, when asked about rferral to Urology, he does not wishes to do so.

## 2024-07-30 NOTE — TELEPHONE ENCOUNTER
Called pt and left a message to call back to [phone] and to ask to speak to a triage nurse.     When pt calls back,     Relay message below from Dr. Kim.     Reba KIRK, RN   Clinic RN  [facility]

## 2024-07-30 NOTE — ASSESSMENT & PLAN NOTE
Related to injury in the 2023, pt since has been going to chiropractor and he feels that his leg strengthm has improved a lot since.

## 2024-07-30 NOTE — PROGRESS NOTES
Preventive Care Visit  [facility]  Selina Kim MD, Family Medicine  Jul 30, 2024      Assessment & Plan   Problem List Items Addressed This Visit       Hyperlipidemia LDL goal <100     Started on Pravastatin 40 mg daily.           Relevant Medications    pravastatin (PRAVACHOL) 40 MG tablet    Essential hypertension with goal blood pressure less than 140/90     Controlled on the current medications with metoprolol, and lisinopril 5 mg daily.           Relevant Medications    lisinopril (ZESTRIL) 5 MG tablet    metoprolol succinate ER (TOPROL XL) 25 MG 24 hr tablet    doxazosin (CARDURA) 4 MG tablet    Benign non-nodular prostatic hyperplasia without lower urinary tract symptoms     Pt is still struggling with symptoms of urgency and sometimes inability to urinate, and he is wondering if he can increase the dose of Doxazosin to 4 mg, when asked about rferral to Urology, he does not wishes to do so.           Relevant Medications    doxazosin (CARDURA) 4 MG tablet    Type 2 diabetes mellitus with hyperglycemia, without long-term current use of insulin (H)     Diabetes is controlled, pt has been checking it regularly and it is less than 100 per patient.  Pt is still on glipizide 10 mg daily. And pioglitazone 45 mg daily.  Continue current treatment regimen.  Diabetes will be reassessed in 6 months.         Relevant Medications    glipiZIDE (GLUCOTROL) 10 MG tablet    pioglitazone (ACTOS) 45 MG tablet    Other Relevant Orders    FOOT EXAM (Completed)    Hemoglobin A1c (Completed)    Denervation atrophy of muscle     Related to injury in the 2023, pt since has been going to chiropractor and he feels that his leg strengthm has improved a lot since.          Other Visit Diagnoses       Encounter for Medicare annual wellness exam    -  Primary    Screen for colon cancer        Relevant Orders    Colonoscopy Screening  Referral                    BMI  Estimated body mass index is 27.31  "kg/m  as calculated from the following:    Height as of this encounter: 1.702 m (5' 7\").    Weight as of this encounter: 79.1 kg (174 lb 6.4 oz).   Weight management plan: Discussed healthy diet and exercise guidelines    Counseling  Appropriate preventive services were addressed with this patient via screening, questionnaire, or discussion as appropriate for fall prevention, nutrition, physical activity, Tobacco-use cessation, weight loss and cognition.  Checklist reviewing preventive services available has been given to the patient.  Reviewed patient's diet, addressing concerns and/or questions.   The patient was provided with written information regarding signs of hearing loss.   Information on urinary incontinence and treatment options given to patient.           Agustin Fatima is a 78 year old, presenting for the following:  Wellness Visit        7/30/2024     9:45 AM   Additional Questions   Roomed by Adelita   Accompanied by Self         Health Care Directive  Patient has a Health Care Directive on file  Advance care planning document is on file and is current.    HPI      Diabetes Follow-up    How often are you checking your blood sugar? A few times a week  What time of day are you checking your blood sugars (select all that apply)?  Before meals  Have you had any blood sugars above 200?  No  Have you had any blood sugars below 70?  No  What symptoms do you notice when your blood sugar is low?  None  What concerns do you have today about your diabetes? None   Do you have any of these symptoms? (Select all that apply)  Numbness in feet  Have you had a diabetic eye exam in the last 12 months? No        BP Readings from Last 2 Encounters:   07/30/24 123/65   10/24/23 (!) 159/76     Hemoglobin A1C (%)   Date Value   07/30/2024 8.0 (H)   03/22/2024 7.1 (H)   05/03/2021 7.8 (H)   02/01/2021 7.5 (H)     LDL Cholesterol Calculated (mg/dL)   Date Value   03/22/2024 66   01/12/2023 169 (H)   02/01/2021 158 (H) "   11/04/2019 160 (H)               7/29/2024   General Health   How would you rate your overall physical health? Good   Feel stress (tense, anxious, or unable to sleep) Not at all            7/29/2024   Nutrition   Diet: Low salt    Diabetic       Multiple values from one day are sorted in reverse-chronological order         7/29/2024   Exercise   Days per week of moderate/strenous exercise 4 days   Average minutes spent exercising at this level 60 min            7/29/2024   Social Factors   Frequency of gathering with friends or relatives Once a week   Worry food won't last until get money to buy more No   Food not last or not have enough money for food? No   Do you have housing? (Housing is defined as stable permanent housing and does not include staying ouside in a car, in a tent, in an abandoned building, in an overnight shelter, or couch-surfing.) Yes   Are you worried about losing your housing? No   Lack of transportation? No   Unable to get utilities (heat,electricity)? No            7/30/2024   Fall Risk   Gait Speed Test (Document in seconds) 5.38   Gait Speed Test Interpretation Greater than 5.01 seconds - ABNORMAL             7/29/2024   Activities of Daily Living- Home Safety   Needs help with the following daily activites None of the above   Safety concerns in the home None of the above            7/29/2024   Dental   Dentist two times every year? Yes            7/29/2024   Hearing Screening   Hearing concerns? (!) IT'S HARD TO FOLLOW A CONVERSATION IN A NOISY RESTAURANT OR CROWDED ROOM.    (!) TROUBLE FOLLOWING DIALOGUE IN THE THEATHER.       Multiple values from one day are sorted in reverse-chronological order         7/29/2024   Driving Risk Screening   Patient/family members have concerns about driving No            7/29/2024   General Alertness/Fatigue Screening   Have you been more tired than usual lately? No            7/29/2024   Urinary Incontinence Screening   Bothered by leaking urine in  past 6 months Yes            7/29/2024   TB Screening   Were you born outside of the [place]? No            Today's PHQ-2 Score:       7/29/2024     6:47 PM   PHQ-2 ( 1999 [employer])   Q1: Little interest or pleasure in doing things 0   Q2: Feeling down, depressed or hopeless 0   PHQ-2 Score 0   Q1: Little interest or pleasure in doing things Not at all   Q2: Feeling down, depressed or hopeless Not at all   PHQ-2 Score 0           7/29/2024   Substance Use   Alcohol more than 3/day or more than 7/wk No   Do you have a current opioid prescription? No   How severe/bad is pain from 1 to 10? 0/10 (No Pain)   Do you use any other substances recreationally? No        Social History     Tobacco Use    Smoking status: Never    Smokeless tobacco: Never    Tobacco comments:     no real history   Vaping Use    Vaping status: Never Used   Substance Use Topics    Alcohol use: Not Currently    Drug use: No       ASCVD Risk   The ASCVD Risk score (Lilliana HERNANDEZ, et al., 2019) failed to calculate for the following reasons:    The valid total cholesterol range is 130 to 320 mg/dL            Reviewed and updated as needed this visit by Provider                    Past Medical History:   Diagnosis Date    Asthma     Benign non-nodular prostatic hyperplasia without lower urinary tract symptoms 7/18/2016    BPH (benign prostatic hyperplasia) 9/14/2011    Campylobacter diarrhea 7/18/2016    CMC arthritis, thumb, degenerative 11/25/2013    Diabetes type 2, uncontrolled 7/18/2016    Essential hypertension with goal blood pressure less than 140/90 7/18/2016    Fatigue, unspecified type 7/18/2016    Heart palpitations 9/14/2011    HTN (hypertension) 9/14/2011    HTN, goal below 140/90 1/4/2012    Hyperglycemia 10/27/2011    Hyperlipidemia LDL goal <100 10/27/2011    Impacted cerumen 9/14/2011    Intermittent asthma 9/14/2011    Lumbar radiculopathy 11/7/2011    Mumps     Pain in joint involving pelvic region and thigh, left 6/14/2022     Palpitations     Post-nasal drainage 9/14/2011    Presbycusis 9/14/2011    Seborrheic keratoses 9/14/2011    Tubular adenoma of colon     Type 2 diabetes mellitus without complications (H) 10/12/2015    Type 2 diabetes, HbA1c goal < 7% (H) 1/6/2012    Type II diabetes mellitus with HbA1C goal between 7 and 8 6/18/2013    Uncontrolled type 2 diabetes mellitus without complication, without long-term current use of insulin 12/27/2016     Past Surgical History:   Procedure Laterality Date    COLONOSCOPY  1/29/14    COLONOSCOPY  1/29/2014    Procedure: COMBINED COLONOSCOPY, SINGLE BIOPSY/POLYPECTOMY BY BIOPSY;  COLONOSCOPY two polyps with jumbo forceps;  Surgeon: Alina Hopkins MD;  Location: [facility]    EYE SURGERY Right 12/14/16    cataract removal     ingrown toenail       Current providers sharing in care for this patient include:  Patient Care Team:  Selina Kim MD as PCP - General (Family Medicine)  Dejuan Felix MD as MD (Urology)  Albert Huggins MD as Referring Physician (Family Practice)  Daniel Win MD as MD (Cardiovascular Disease)  Daniel Win MD as Assigned Heart and Vascular Provider  Florentin Tomas PA-C as Assigned PCP    The following health maintenance items are reviewed in Epic and correct as of today:  Health Maintenance   Topic Date Due    RSV VACCINE (Pregnancy & 60+) (1 - 1-dose 60+ series) Never done    DIABETIC FOOT EXAM  02/09/2022    DTAP/TDAP/TD IMMUNIZATION (2 - Td or Tdap) 01/11/2023    COVID-19 Vaccine (8 - 2023-24 season) 02/26/2024    COLORECTAL CANCER SCREENING  02/27/2024    EYE EXAM  08/28/2024    INFLUENZA VACCINE (1) 09/01/2024    A1C  09/22/2024    ANNUAL REVIEW OF HM ORDERS  10/24/2024    BMP  03/22/2025    LIPID  03/22/2025    MICROALBUMIN  03/22/2025    MEDICARE ANNUAL WELLNESS VISIT  07/30/2025    FALL RISK ASSESSMENT  07/30/2025    ADVANCE CARE PLANNING  07/30/2029    HEPATITIS C SCREENING  Completed    PHQ-2 (once per calendar year)   "Completed    Pneumococcal Vaccine: 65+ Years  Completed    ZOSTER IMMUNIZATION  Completed    IPV IMMUNIZATION  Aged Out    HPV IMMUNIZATION  Aged Out    MENINGITIS IMMUNIZATION  Aged Out    RSV MONOCLONAL ANTIBODY  Aged Out         Review of Systems  Constitutional, HEENT, cardiovascular, pulmonary, GI, , musculoskeletal, neuro, skin, endocrine and psych systems are negative, except as otherwise noted.     Objective    Exam  BP (!) 140/60 (BP Location: Right arm, Patient Position: Sitting, Cuff Size: Adult Large)   Pulse 54   Temp 97.9  F (36.6  C) (Oral)   Resp 17   Ht 1.702 m (5' 7\")   Wt 79.1 kg (174 lb 6.4 oz)   SpO2 96%   BMI 27.31 kg/m     Estimated body mass index is 27.31 kg/m  as calculated from the following:    Height as of this encounter: 1.702 m (5' 7\").    Weight as of this encounter: 79.1 kg (174 lb 6.4 oz).    Physical Exam  GENERAL: alert and no distress  NECK: no adenopathy, no asymmetry, masses, or scars  RESP: lungs clear to auscultation - no rales, rhonchi or wheezes  CV: regular rate and rhythm, normal S1 S2, no S3 or S4, no murmur, click or rub, no peripheral edema  ABDOMEN: soft, nontender, no hepatosplenomegaly, no masses and bowel sounds normal  MS: no gross musculoskeletal defects noted, no edema        7/30/2024   Mini Cog   Clock Draw Score 2 Normal   3 Item Recall 3 objects recalled   Mini Cog Total Score 5                 Signed Electronically by: Selina Kim MD    "

## 2024-07-30 NOTE — TELEPHONE ENCOUNTER
As Kushal expected, his blood sugar is worse, please focus on diet and exercise, will recheck in 6 months during his upcoming visit then.

## 2024-07-30 NOTE — ASSESSMENT & PLAN NOTE
Diabetes is controlled, pt has been checking it regularly and it is less than 100 per patient.  Pt is still on glipizide 10 mg daily. And pioglitazone 45 mg daily.  Continue current treatment regimen.  Diabetes will be reassessed in 6 months.

## 2024-07-30 NOTE — PATIENT INSTRUCTIONS
Patient Education   Preventive Care Advice   This is general advice given by our system to help you stay healthy. However, your care team may have specific advice just for you. Please talk to your care team about your preventive care needs.  Nutrition  Eat 5 or more servings of fruits and vegetables each day.  Try wheat bread, brown rice and whole grain pasta (instead of white bread, rice, and pasta).  Get enough calcium and vitamin D. Check the label on foods and aim for 100% of the RDA (recommended daily allowance).  Lifestyle  Exercise at least 150 minutes each week  (30 minutes a day, 5 days a week).  Do muscle strengthening activities 2 days a week. These help control your weight and prevent disease.  No smoking.  Wear sunscreen to prevent skin cancer.  Have a dental exam and cleaning every 6 months.  Yearly exams  See your health care team every year to talk about:  Any changes in your health.  Any medicines your care team has prescribed.  Preventive care, family planning, and ways to prevent chronic diseases.  Shots (vaccines)   HPV shots (up to age 26), if you've never had them before.  Hepatitis B shots (up to age 59), if you've never had them before.  COVID-19 shot: Get this shot when it's due.  Flu shot: Get a flu shot every year.  Tetanus shot: Get a tetanus shot every 10 years.  Pneumococcal, hepatitis A, and RSV shots: Ask your care team if you need these based on your risk.  Shingles shot (for age 50 and up)  General health tests  Diabetes screening:  Starting at age 35, Get screened for diabetes at least every 3 years.  If you are younger than age 35, ask your care team if you should be screened for diabetes.  Cholesterol test: At age 39, start having a cholesterol test every 5 years, or more often if advised.  Bone density scan (DEXA): At age 50, ask your care team if you should have this scan for osteoporosis (brittle bones).  Hepatitis C: Get tested at least once in your life.  STIs (sexually  transmitted infections)  Before age 24: Ask your care team if you should be screened for STIs.  After age 24: Get screened for STIs if you're at risk. You are at risk for STIs (including HIV) if:  You are sexually active with more than one person.  You don't use condoms every time.  You or a partner was diagnosed with a sexually transmitted infection.  If you are at risk for HIV, ask about PrEP medicine to prevent HIV.  Get tested for HIV at least once in your life, whether you are at risk for HIV or not.  Cancer screening tests  Cervical cancer screening: If you have a cervix, begin getting regular cervical cancer screening tests starting at age 21.  Breast cancer scan (mammogram): If you've ever had breasts, begin having regular mammograms starting at age 40. This is a scan to check for breast cancer.  Colon cancer screening: It is important to start screening for colon cancer at age 45.  Have a colonoscopy test every 10 years (or more often if you're at risk) Or, ask your provider about stool tests like a FIT test every year or Cologuard test every 3 years.  To learn more about your testing options, visit:   .  For help making a decision, visit:   https://bit.ly/xs57483.  Prostate cancer screening test: If you have a prostate, ask your care team if a prostate cancer screening test (PSA) at age 55 is right for you.  Lung cancer screening: If you are a current or former smoker ages 50 to 80, ask your care team if ongoing lung cancer screenings are right for you.  For informational purposes only. Not to replace the advice of your health care provider. Copyright   2023 [facility]. All rights reserved. Clinically reviewed by the [facility]. [employer] [id number] - REV 01/24.  Preventing Falls: Care Instructions  Injuries and health problems such as trouble walking or poor eyesight can increase your risk of falling. So can some medicines. But there are things you can do to help  "prevent falls. You can exercise to get stronger. You can also arrange your home to make it safer.    Talk to your doctor about the medicines you take. Ask if any of them increase the risk of falls and whether they can be changed or stopped.   Try to exercise regularly. It can help improve your strength and balance. This can help lower your risk of falling.     Practice fall safety and prevention.    Wear low-heeled shoes that fit well and give your feet good support. Talk to your doctor if you have foot problems that make this hard.  Carry a cellphone or wear a medical alert device that you can use to call for help.  Use stepladders instead of chairs to reach high objects. Don't climb if you're at risk for falls. Ask for help, if needed.  Wear the correct eyeglasses, if you need them.    Make your home safer.    Remove rugs, cords, clutter, and furniture from walkways.  Keep your house well lit. Use night-lights in hallways and bathrooms.  Install and use sturdy handrails on stairways.  Wear nonskid footwear, even inside. Don't walk barefoot or in socks without shoes.    Be safe outside.    Use handrails, curb cuts, and ramps whenever possible.  Keep your hands free by using a shoulder bag or backpack.  Try to walk in well-lit areas. Watch out for uneven ground, changes in pavement, and debris.  Be careful in the winter. Walk on the grass or gravel when sidewalks are slippery. Use de-icer on steps and walkways. Add non-slip devices to shoes.    Put grab bars and nonskid mats in your shower or tub and near the toilet. Try to use a shower chair or bath bench when bathing.   Get into a tub or shower by putting in your weaker leg first. Get out with your strong side first. Have a phone or medical alert device in the bathroom with you.   Where can you learn more?  Go to https://www.[employer].net/patiented  Enter G117 in the search box to learn more about \"Preventing Falls: Care Instructions.\"  Current as of: July 17, " 2023               Content Version: 14.0    1148-0658 [employer].   Care instructions adapted under license by your healthcare professional. If you have questions about a medical condition or this instruction, always ask your healthcare professional. [employer] disclaims any warranty or liability for your use of this information.      Hearing Loss: Care Instructions  Overview     Hearing loss is a sudden or slow decrease in how well you hear. It can range from slight to profound. Permanent hearing loss can occur with aging. It also can happen when you are exposed long-term to loud noise. Examples include listening to loud music, riding motorcycles, or being around other loud machines.  Hearing loss can affect your work and home life. It can make you feel lonely or depressed. You may feel that you have lost your independence. But hearing aids and other devices can help you hear better and feel connected to others.  Follow-up care is a key part of your treatment and safety. Be sure to make and go to all appointments, and call your doctor if you are having problems. It's also a good idea to know your test results and keep a list of the medicines you take.  How can you care for yourself at home?  Avoid loud noises whenever possible. This helps keep your hearing from getting worse.  Always wear hearing protection around loud noises.  Wear a hearing aid as directed.  A professional can help you pick a hearing aid that will work best for you.  You can also get hearing aids over the counter for mild to moderate hearing loss.  Have hearing tests as your doctor suggests. They can show whether your hearing has changed. Your hearing aid may need to be adjusted.  Use other devices as needed. These may include:  Telephone amplifiers and hearing aids that can connect to a television, stereo, radio, or microphone.  Devices that use lights or vibrations. These alert you to the doorbell, a ringing  "telephone, or a baby monitor.  Television closed-captioning. This shows the words at the bottom of the screen. Most new TVs can do this.  TTY (text telephone). This lets you type messages back and forth on the telephone instead of talking or listening. These devices are also called TDD. When messages are typed on the keyboard, they are sent over the phone line to a receiving TTY. The message is shown on a monitor.  Use text messaging, social media, and email if it is hard for you to communicate by telephone.  Try to learn a listening technique called speechreading. It is not lipreading. You pay attention to people's gestures, expressions, posture, and tone of voice. These clues can help you understand what a person is saying. Face the person you are talking to, and have them face you. Make sure the lighting is good. You need to see the other person's face clearly.  Think about counseling if you need help to adjust to your hearing loss.  When should you call for help?  Watch closely for changes in your health, and be sure to contact your doctor if:    You think your hearing is getting worse.     You have new symptoms, such as dizziness or nausea.   Where can you learn more?  Go to https://www.[employer].net/patiented  Enter R798 in the search box to learn more about \"Hearing Loss: Care Instructions.\"  Current as of: September 27, 2023               Content Version: 14.0    1146-0754 "[employer].   Care instructions adapted under license by your healthcare professional. If you have questions about a medical condition or this instruction, always ask your healthcare professional. "[employer] disclaims any warranty or liability for your use of this information.      Bladder Training: Care Instructions  Your Care Instructions     Bladder training is used to treat urge incontinence and stress incontinence. Urge incontinence means that the need to urinate comes on so fast that you can't get to a " toilet in time. Stress incontinence means that you leak urine because of pressure on your bladder. For example, it may happen when you laugh, cough, or lift something heavy.  Bladder training can increase how long you can wait before you have to urinate. It can also help your bladder hold more urine. And it can give you better control over the urge to urinate.  It is important to remember that bladder training takes a few weeks to a few months to make a difference. You may not see results right away, but don't give up.  Follow-up care is a key part of your treatment and safety. Be sure to make and go to all appointments, and call your doctor if you are having problems. It's also a good idea to know your test results and keep a list of the medicines you take.  How can you care for yourself at home?  Work with your doctor to come up with a bladder training program that is right for you. You may use one or more of the following methods.  Delayed urination  In the beginning, try to keep from urinating for 5 minutes after you first feel the need to go.  While you wait, take deep, slow breaths to relax. Kegel exercises can also help you delay the need to go to the bathroom.  After some practice, when you can easily wait 5 minutes to urinate, try to wait 10 minutes before you urinate.  Slowly increase the waiting period until you are able to control when you have to urinate.  Scheduled urination  Empty your bladder when you first wake up in the morning.  Schedule times throughout the day when you will urinate.  Start by going to the bathroom every hour, even if you don't need to go.  Slowly increase the time between trips to the bathroom.  When you have found a schedule that works well for you, keep doing it.  If you wake up during the night and have to urinate, do it. Apply your schedule to waking hours only.  Kegel exercises  These tighten and strengthen pelvic muscles, which can help you control the flow of urine. (If  "doing these exercises causes pain, stop doing them and talk with your doctor.) To do Kegel exercises:  Squeeze your muscles as if you were trying not to pass gas. Or squeeze your muscles as if you were stopping the flow of urine. Your belly, legs, and buttocks shouldn't move.  Hold the squeeze for 3 seconds, then relax for 5 to 10 seconds.  Start with 3 seconds, then add 1 second each week until you are able to squeeze for 10 seconds.  Repeat the exercise 10 times a session. Do 3 to 8 sessions a day.  When should you call for help?  Watch closely for changes in your health, and be sure to contact your doctor if:    Your incontinence is getting worse.     You do not get better as expected.   Where can you learn more?  Go to https://www.[employer].net/patiented  Enter V684 in the search box to learn more about \"Bladder Training: Care Instructions.\"  Current as of: November 15, 2023               Content Version: 14.0    0770-7235 [employer].   Care instructions adapted under license by your healthcare professional. If you have questions about a medical condition or this instruction, always ask your healthcare professional. [employer] disclaims any warranty or liability for your use of this information.         "

## 2024-07-31 NOTE — TELEPHONE ENCOUNTER
RN spoke to patient     Reviewed results note below from pcp and A1C result   Patient had a lot of family gatherings (reunion, death of family member) and allowed himself to eat differently than he typically does   Patient will work on diet and exercise   Advised follow up visit needed in 6 months, patient is not able to schedule at this time do to not having this calendar yet, will call back to schedule, RN advised to call 1-2 months in advance if able so we ensure an appt spot, patient agrees     Charley Pitts Registered Nurse  [facility]

## 2024-08-28 ENCOUNTER — TELEPHONE (OUTPATIENT)
Dept: GASTROENTEROLOGY | Facility: CLINIC | Age: 79
End: 2024-08-28
Payer: MEDICARE

## 2024-08-28 DIAGNOSIS — Z12.11 SPECIAL SCREENING FOR MALIGNANT NEOPLASMS, COLON: Primary | ICD-10-CM

## 2024-08-28 NOTE — TELEPHONE ENCOUNTER
"Endoscopy Scheduling Screen    Have you had a positive Covid test in the last 14 days?  No    What is your communication preference for Instructions and/or Bowel Prep?   MyChart    What insurance is in the chart?  Other:  Medicare    Ordering/Referring Provider: Julio   (If ordering provider performs procedure, schedule with ordering provider unless otherwise instructed. )    BMI: Estimated body mass index is 27.31 kg/m  as calculated from the following:    Height as of 7/30/24: 1.702 m (5' 7\").    Weight as of 7/30/24: 79.1 kg (174 lb 6.4 oz).     Sedation Ordered  moderate sedation.   If patient BMI > 50 do not schedule in [facility].    If patient BMI > 45 do not schedule at [facility].    Are you taking methadone or Suboxone?  No    Have you had difficulties, pain, or discomfort during past endoscopy procedures?  No    Are you taking any prescription medications for pain 3 or more times per week?   NO, No RN review required.    Do you have a history of malignant hyperthermia?  No    (Females) Are you currently pregnant?        Have you been diagnosed or told you have pulmonary hypertension?   No    Do you have an LVAD?  No    Have you been told you have moderate to severe sleep apnea?  No    Have you been told you have COPD, asthma, or any other lung disease?  No    Do you have any heart conditions?  Yes -some heart palpitations    In the past year, have you had any hospitalizations for heart related issues including cardiomyopathy, heart attack, or stent placement?  No    Do you have any implantable devices in your body (pacemaker, ICD)?  No    Do you take nitroglycerine?  No    Have you ever had or are you waiting for an organ transplant?  No. Continue scheduling, no site restrictions.    Have you had a stroke or transient ischemic attack (TIA aka \"mini stroke\" in the last 6 months?   No    Have you been diagnosed with or been told you have cirrhosis of the liver?   No    Are you currently on dialysis?   No    Do you need " "assistance transferring?   No    BMI: Estimated body mass index is 27.31 kg/m  as calculated from the following:    Height as of 7/30/24: 1.702 m (5' 7\").    Weight as of 7/30/24: 79.1 kg (174 lb 6.4 oz).     Is patients BMI > 40 and scheduling location UP?  No    Do you take an injectable medication for weight loss or diabetes (excluding insulin)?  No    Do you take the medication Naltrexone?  No    Do you take blood thinners?  No       Prep   Are you currently on dialysis or do you have chronic kidney disease?  No    Do you have a diagnosis of diabetes?  Yes (Golytely Prep)    Do you have a diagnosis of cystic fibrosis (CF)?  No    On a regular basis do you go 3 -5 days between bowel movements?  No    BMI > 40?  No    Preferred Pharmacy:    [id number] [id number] [place] [id number]  Phone: [phone] Fax: [phone]      Final Scheduling Details     Procedure scheduled  Colonoscopy    Surgeon:  Agatha     Date of procedure:  10/15/24     Pre-OP / PAC:   No - Not required for this site.    Location  [facility] - Patient preference.    Sedation   Moderate Sedation - Per order.      Patient Reminders:   You will receive a call from a Nurse to review instructions and health history.  This assessment must be completed prior to your procedure.  Failure to complete the Nurse assessment may result in the procedure being cancelled.      On the day of your procedure, please designate an adult(s) who can drive you home stay with you for the next 24 hours. The medicines used in the exam will make you sleepy. You will not be able to drive.      You cannot take public transportation, ride share services, or non-medical taxi service without a responsible caregiver.  Medical transport services are allowed with the requirement that a responsible caregiver will receive you at your destination.  We require that drivers and caregivers are " confirmed prior to your procedure.

## 2024-09-24 RX ORDER — BISACODYL 5 MG/1
TABLET, DELAYED RELEASE ORAL
Qty: 4 TABLET | Refills: 0 | Status: SHIPPED | OUTPATIENT
Start: 2024-09-24

## 2024-09-24 NOTE — TELEPHONE ENCOUNTER
Standard Golytely Bowel Prep recommended due to diabetes.  Instructions were sent via [employer]. Bowel prep was sent 9/24/2024 to [id number] - [place], [place] - [address] AT [place] [address].

## 2024-10-03 ENCOUNTER — TELEPHONE (OUTPATIENT)
Dept: GASTROENTEROLOGY | Facility: CLINIC | Age: 79
End: 2024-10-03
Payer: MEDICARE

## 2024-10-03 ENCOUNTER — TRANSFERRED RECORDS (OUTPATIENT)
Dept: HEALTH INFORMATION MANAGEMENT | Facility: CLINIC | Age: 79
End: 2024-10-03
Payer: MEDICARE

## 2024-10-03 NOTE — TELEPHONE ENCOUNTER
Pre visit planning completed.      Procedure details:    Patient scheduled for Colonoscopy on 10/15/24.     Arrival time: 0730. Procedure time 0815    Facility location: [facility]; [address] [place] [id number]. Check in location: Main entrance, door #1 on the North side of the building under roundabout awning. DO NOT GO TO SURGERY/ED ENTRANCE.     Sedation type: Conscious sedation  - As a note: MAC for 2019 colon, but conscious sedation for 2014 colon.     Pre op exam needed? No.    Indication for procedure: screening       Chart review:     Electronic implanted devices? No    Recent diagnosis of diverticulitis within the last 6 weeks? No      Medication review:    Diabetic? Yes. Oral diabetic medications: Actos (pioglitazone): HOLD day of procedure.  Glipizide (glucotrol): HOLD day of procedure.    Anticoagulants? No    Weight loss medication/injectable? No.    Other medication HOLDING recommendations:  N/A      Prep for procedure:     Bowel prep recommendation: Standard Golytely. Bowel prep prescription sent to [facility] #[id number] [facility] [address] AT [place] [address] by CRC team.    Due to: diabetes.     Prep instructions sent via [email or web address] by CRC team.         Alea Castellanos RN  Endoscopy Procedure Pre Assessment RN  [phone] option 2

## 2024-10-03 NOTE — TELEPHONE ENCOUNTER
Attempted to contact patient in order to complete pre assessment questions.     Person answered phone stating that patient is not available. They janeth have patient return call to [phone] option 2    Callback communication sent via [employer].      Jannet Daniel RN  Endoscopy Procedure Pre Assessment

## 2024-10-04 NOTE — TELEPHONE ENCOUNTER
Pre assessment completed for upcoming procedure.   (Please see previous telephone encounter notes for complete details)    Patient  returned call.       Procedure details:    Arrival time and facility location reviewed.    Pre op exam needed? No.    Designated  policy reviewed. Instructed to have someone stay 6  hours post procedure.       Medication review:    Oral diabetic medication(s): Actos (pioglitazone): HOLD day of procedure.  Glipizide (glucotrol): HOLD day of procedure.      Prep for procedure:     Procedure prep instructions reviewed.        Any additional information needed:  N/A      Patient  verbalized understanding and had no questions or concerns at this time.      Letha Sanderson RN  Endoscopy Procedure Pre Assessment   [phone] option 2

## 2024-10-15 ENCOUNTER — HOSPITAL ENCOUNTER (OUTPATIENT)
Facility: CLINIC | Age: 79
Discharge: HOME | End: 2024-10-15
Attending: COLON & RECTAL SURGERY | Admitting: COLON & RECTAL SURGERY
Payer: MEDICARE

## 2024-10-15 VITALS
HEIGHT: 68 IN | RESPIRATION RATE: 16 BRPM | OXYGEN SATURATION: 96 % | WEIGHT: 170 LBS | BODY MASS INDEX: 25.76 KG/M2 | TEMPERATURE: 97.1 F | SYSTOLIC BLOOD PRESSURE: 150 MMHG | HEART RATE: 64 BPM | DIASTOLIC BLOOD PRESSURE: 93 MMHG

## 2024-10-15 LAB
COLONOSCOPY: NORMAL
GLUCOSE BLDC GLUCOMTR-MCNC: 116 MG/DL (ref 70–99)

## 2024-10-15 PROCEDURE — G0500 MOD SEDAT ENDO SERVICE >5YRS: HCPCS | Performed by: COLON & RECTAL SURGERY

## 2024-10-15 PROCEDURE — G0105 COLORECTAL SCRN; HI RISK IND: HCPCS | Performed by: COLON & RECTAL SURGERY

## 2024-10-15 PROCEDURE — 45378 DIAGNOSTIC COLONOSCOPY: CPT | Performed by: COLON & RECTAL SURGERY

## 2024-10-15 PROCEDURE — 82962 GLUCOSE BLOOD TEST: CPT

## 2024-10-15 PROCEDURE — 250N000011 HC RX IP 250 OP 636: Performed by: COLON & RECTAL SURGERY

## 2024-10-15 RX ORDER — ATROPINE SULFATE 0.1 MG/ML
1 INJECTION INTRAVENOUS
Status: DISCONTINUED | OUTPATIENT
Start: 2024-10-15 | End: 2024-10-15 | Stop reason: HOSPADM

## 2024-10-15 RX ORDER — ONDANSETRON 4 MG/1
4 TABLET, ORALLY DISINTEGRATING ORAL EVERY 6 HOURS PRN
Status: CANCELLED | OUTPATIENT
Start: 2024-10-15

## 2024-10-15 RX ORDER — FLUMAZENIL 0.1 MG/ML
0.2 INJECTION, SOLUTION INTRAVENOUS
Status: CANCELLED | OUTPATIENT
Start: 2024-10-15 | End: 2024-10-15

## 2024-10-15 RX ORDER — ONDANSETRON 2 MG/ML
4 INJECTION INTRAMUSCULAR; INTRAVENOUS
Status: DISCONTINUED | OUTPATIENT
Start: 2024-10-15 | End: 2024-10-15 | Stop reason: HOSPADM

## 2024-10-15 RX ORDER — EPINEPHRINE 1 MG/ML
0.1 INJECTION, SOLUTION, CONCENTRATE INTRAVENOUS
Status: DISCONTINUED | OUTPATIENT
Start: 2024-10-15 | End: 2024-10-15 | Stop reason: HOSPADM

## 2024-10-15 RX ORDER — SIMETHICONE 40MG/0.6ML
133 SUSPENSION, DROPS(FINAL DOSAGE FORM)(ML) ORAL
Status: DISCONTINUED | OUTPATIENT
Start: 2024-10-15 | End: 2024-10-15 | Stop reason: HOSPADM

## 2024-10-15 RX ORDER — NALOXONE HYDROCHLORIDE 0.4 MG/ML
0.4 INJECTION, SOLUTION INTRAMUSCULAR; INTRAVENOUS; SUBCUTANEOUS
Status: DISCONTINUED | OUTPATIENT
Start: 2024-10-15 | End: 2024-10-15 | Stop reason: HOSPADM

## 2024-10-15 RX ORDER — FLUMAZENIL 0.1 MG/ML
0.2 INJECTION, SOLUTION INTRAVENOUS
Status: DISCONTINUED | OUTPATIENT
Start: 2024-10-15 | End: 2024-10-15 | Stop reason: HOSPADM

## 2024-10-15 RX ORDER — NALOXONE HYDROCHLORIDE 0.4 MG/ML
0.2 INJECTION, SOLUTION INTRAMUSCULAR; INTRAVENOUS; SUBCUTANEOUS
Status: CANCELLED | OUTPATIENT
Start: 2024-10-15

## 2024-10-15 RX ORDER — DIPHENHYDRAMINE HYDROCHLORIDE 50 MG/ML
25-50 INJECTION INTRAMUSCULAR; INTRAVENOUS
Status: DISCONTINUED | OUTPATIENT
Start: 2024-10-15 | End: 2024-10-15 | Stop reason: HOSPADM

## 2024-10-15 RX ORDER — LIDOCAINE 40 MG/G
CREAM TOPICAL
Status: DISCONTINUED | OUTPATIENT
Start: 2024-10-15 | End: 2024-10-15 | Stop reason: HOSPADM

## 2024-10-15 RX ORDER — NALOXONE HYDROCHLORIDE 0.4 MG/ML
0.4 INJECTION, SOLUTION INTRAMUSCULAR; INTRAVENOUS; SUBCUTANEOUS
Status: CANCELLED | OUTPATIENT
Start: 2024-10-15

## 2024-10-15 RX ORDER — PROCHLORPERAZINE MALEATE 5 MG/1
5 TABLET ORAL EVERY 6 HOURS PRN
Status: CANCELLED | OUTPATIENT
Start: 2024-10-15

## 2024-10-15 RX ORDER — NALOXONE HYDROCHLORIDE 0.4 MG/ML
0.2 INJECTION, SOLUTION INTRAMUSCULAR; INTRAVENOUS; SUBCUTANEOUS
Status: DISCONTINUED | OUTPATIENT
Start: 2024-10-15 | End: 2024-10-15 | Stop reason: HOSPADM

## 2024-10-15 RX ORDER — FENTANYL CITRATE 50 UG/ML
50-100 INJECTION, SOLUTION INTRAMUSCULAR; INTRAVENOUS EVERY 5 MIN PRN
Status: DISCONTINUED | OUTPATIENT
Start: 2024-10-15 | End: 2024-10-15 | Stop reason: HOSPADM

## 2024-10-15 RX ORDER — ONDANSETRON 2 MG/ML
4 INJECTION INTRAMUSCULAR; INTRAVENOUS EVERY 6 HOURS PRN
Status: CANCELLED | OUTPATIENT
Start: 2024-10-15

## 2024-10-15 RX ADMIN — MIDAZOLAM 1 MG: 1 INJECTION INTRAMUSCULAR; INTRAVENOUS at 08:17

## 2024-10-15 RX ADMIN — FENTANYL CITRATE 100 MCG: 50 INJECTION, SOLUTION INTRAMUSCULAR; INTRAVENOUS at 08:17

## 2024-10-15 RX ADMIN — MIDAZOLAM 1 MG: 1 INJECTION INTRAMUSCULAR; INTRAVENOUS at 08:27

## 2024-10-15 RX ADMIN — MIDAZOLAM 1 MG: 1 INJECTION INTRAMUSCULAR; INTRAVENOUS at 08:19

## 2024-10-15 RX ADMIN — MIDAZOLAM 1 MG: 1 INJECTION INTRAMUSCULAR; INTRAVENOUS at 08:22

## 2024-10-15 ASSESSMENT — ACTIVITIES OF DAILY LIVING (ADL)
ADLS_ACUITY_SCORE: 35
ADLS_ACUITY_SCORE: 35

## 2024-10-15 NOTE — H&P
Pre-Endoscopy History and Physical     Kushal Bush MRN# [id number]   YOB: 1945 Age: 78 year old     Date of Procedure: 10/15/2024  Primary care provider: Selina Kim  Type of Endoscopy: colonoscopy  Reason for Procedure: screening  Type of Anesthesia Anticipated: Moderate Sedation    HPI:    Kushal is a 78 year old male who will be undergoing the above procedure.      A history and physical has been performed. The patient's medications and allergies have been reviewed. The risks and benefits of the procedure and the sedation options and risks were discussed with the patient.  All questions were answered and informed consent was obtained.      He denies a personal or family history of anesthesia complications or bleeding disorders.     Allergies   Allergen Reactions    Seasonal Allergies      Pine pollen        Prior to Admission Medications   Prescriptions Last Dose Informant Patient Reported? Taking?   bisacodyl (DULCOLAX) 5 MG EC tablet   No No   Sig: Take 2 tablets at 3 pm the day before your procedure. If your procedure is before 11 am, take 2 additional tablets at 11 pm. If your procedure is after 11 am, take 2 additional tablets at 6 am. For additional instructions refer to your colonoscopy prep instructions.   blood glucose (NO BRAND SPECIFIED) test strip   No No   Sig: Use to test blood sugar 2 times daily or as directed. To accompany: Blood Glucose Monitor Brands: per insurance.   doxazosin (CARDURA) 4 MG tablet   No No   Sig: Take 1 tablet (4 mg) by mouth at bedtime   glipiZIDE (GLUCOTROL) 10 MG tablet   No No   Sig: Take 1 qd   latanoprost (XALATAN) 0.005 % ophthalmic solution   Yes No   Sig: Place 1 drop into both eyes daily   lisinopril (ZESTRIL) 5 MG tablet   No No   Sig: Take 1 tablet (5 mg) by mouth daily   metoprolol succinate ER (TOPROL XL) 25 MG 24 hr tablet   No No   Sig: Take 0.5 tablets (12.5 mg) by mouth daily   pioglitazone (ACTOS) 45 MG tablet   No No   Sig: Take 1 tablet  (45 mg) by mouth daily   polyethylene glycol (GOLYTELY) 236 g suspension   No No   Sig: The night before the exam at 6 pm drink an 8-ounce glass every 15 minutes until the jug is half empty. If you arrive before 11 AM: Drink the other half of the Golytely jug at 11 PM night before procedure. If you arrive after 11 AM: Drink the other half of the Golytely jug at 6 AM day of procedure. For additional instructions refer to your colonoscopy prep instructions.   pravastatin (PRAVACHOL) 40 MG tablet   No No   Sig: Take 1 tablet (40 mg) by mouth daily   timolol maleate (TIMOPTIC) 0.5 % ophthalmic solution   Yes No   Sig: PLACE 1 DROP BOTH EYES ONCE DAILY      Facility-Administered Medications: None       Patient Active Problem List   Diagnosis    Presbycusis    Heart palpitations    BPH (benign prostatic hyperplasia)    Hyperlipidemia LDL goal <100    Lumbar radiculopathy    CMC arthritis, thumb, degenerative    Essential hypertension with goal blood pressure less than 140/90    Benign non-nodular prostatic hyperplasia without lower urinary tract symptoms    Type 2 diabetes mellitus with hyperglycemia, without long-term current use of insulin (H)    Actinic keratosis    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, mild stage    Snoring    Denervation atrophy of muscle        Past Medical History:   Diagnosis Date    Asthma     Benign non-nodular prostatic hyperplasia without lower urinary tract symptoms 7/18/2016    BPH (benign prostatic hyperplasia) 9/14/2011    Campylobacter diarrhea 7/18/2016    CMC arthritis, thumb, degenerative 11/25/2013    Diabetes type 2, uncontrolled 7/18/2016    Essential hypertension with goal blood pressure less than 140/90 7/18/2016    Fatigue, unspecified type 7/18/2016    Heart palpitations 9/14/2011    HTN (hypertension) 9/14/2011    HTN, goal below 140/90 1/4/2012    Hyperglycemia 10/27/2011    Hyperlipidemia LDL goal <100 10/27/2011    Impacted cerumen 9/14/2011    Intermittent  "asthma 9/14/2011    Lumbar radiculopathy 11/7/2011    Mumps     Pain in joint involving pelvic region and thigh, left 6/14/2022    Palpitations     Post-nasal drainage 9/14/2011    Presbycusis 9/14/2011    Seborrheic keratoses 9/14/2011    Tubular adenoma of colon     Type 2 diabetes mellitus without complications (H) 10/12/2015    Type 2 diabetes, HbA1c goal < 7% (H) 1/6/2012    Type II diabetes mellitus with HbA1C goal between 7 and 8 6/18/2013    Uncontrolled type 2 diabetes mellitus without complication, without long-term current use of insulin 12/27/2016        Past Surgical History:   Procedure Laterality Date    COLONOSCOPY  1/29/14    COLONOSCOPY  1/29/2014    Procedure: COMBINED COLONOSCOPY, SINGLE BIOPSY/POLYPECTOMY BY BIOPSY;  COLONOSCOPY two polyps with jumbo forceps;  Surgeon: Alina Hopkins MD;  Location:  GI    EYE SURGERY Right 12/14/16    cataract removal     ingrown toenail         Social History     Tobacco Use    Smoking status: Never    Smokeless tobacco: Never    Tobacco comments:     no real history   Substance Use Topics    Alcohol use: Not Currently       Family History   Problem Relation Age of Onset    Cancer Mother         age 89, uterine/breast    Heart Disease Father         51       REVIEW OF SYSTEMS:     5 point ROS negative except as noted above in HPI, including Gen., Resp., CV, GI &  system review.      PHYSICAL EXAM:   There were no vitals taken for this visit. Estimated body mass index is 27.31 kg/m  as calculated from the following:    Height as of 7/30/24: 1.702 m (5' 7\").    Weight as of 7/30/24: 79.1 kg (174 lb 6.4 oz).   GENERAL APPEARANCE: healthy and alert  MENTAL STATUS: alert  AIRWAY EXAM: Mallampatti Class II (visualization of the soft palate, fauces, and uvula)  RESP: lungs clear to auscultation - no rales, rhonchi or wheezes  CV: regular rates and rhythm      DIAGNOSTICS:    Not indicated      IMPRESSION   ASA Class 2 - Mild systemic disease        PLAN: "       Plan for colonoscopy. We discussed the risks, benefits and alternatives and the patient wished to proceed.    The above has been forwarded to the consulting provider.      Signed Electronically by: Reba Snider MD  October 15, 2024

## 2025-02-08 ENCOUNTER — HEALTH MAINTENANCE LETTER (OUTPATIENT)
Age: 80
End: 2025-02-08

## 2025-06-30 ENCOUNTER — PATIENT OUTREACH (OUTPATIENT)
Dept: CARE COORDINATION | Facility: CLINIC | Age: 80
End: 2025-06-30
Payer: MEDICARE

## 2025-08-04 DIAGNOSIS — I10 ESSENTIAL HYPERTENSION WITH GOAL BLOOD PRESSURE LESS THAN 140/90: ICD-10-CM

## 2025-08-04 DIAGNOSIS — E11.65 TYPE 2 DIABETES MELLITUS WITH HYPERGLYCEMIA, WITHOUT LONG-TERM CURRENT USE OF INSULIN (H): ICD-10-CM

## 2025-08-04 RX ORDER — METOPROLOL SUCCINATE 25 MG/1
12.5 TABLET, EXTENDED RELEASE ORAL DAILY
Qty: 15 TABLET | Refills: 0 | Status: SHIPPED | OUTPATIENT
Start: 2025-08-04 | End: 2025-08-06

## 2025-08-04 RX ORDER — PIOGLITAZONE 45 MG/1
45 TABLET ORAL DAILY
Qty: 90 TABLET | Refills: 0 | Status: SHIPPED | OUTPATIENT
Start: 2025-08-04 | End: 2025-08-06

## 2025-08-04 RX ORDER — GLIPIZIDE 10 MG/1
10 TABLET ORAL DAILY
Qty: 90 TABLET | Refills: 0 | Status: SHIPPED | OUTPATIENT
Start: 2025-08-04 | End: 2025-08-06

## 2025-08-05 SDOH — HEALTH STABILITY: PHYSICAL HEALTH: ON AVERAGE, HOW MANY MINUTES DO YOU ENGAGE IN EXERCISE AT THIS LEVEL?: 30 MIN

## 2025-08-05 SDOH — HEALTH STABILITY: PHYSICAL HEALTH: ON AVERAGE, HOW MANY DAYS PER WEEK DO YOU ENGAGE IN MODERATE TO STRENUOUS EXERCISE (LIKE A BRISK WALK)?: 5 DAYS

## 2025-08-05 ASSESSMENT — SOCIAL DETERMINANTS OF HEALTH (SDOH): HOW OFTEN DO YOU GET TOGETHER WITH FRIENDS OR RELATIVES?: TWICE A WEEK

## 2025-08-06 ENCOUNTER — OFFICE VISIT (OUTPATIENT)
Dept: FAMILY MEDICINE | Facility: CLINIC | Age: 80
End: 2025-08-06
Attending: FAMILY MEDICINE
Payer: MEDICARE

## 2025-08-06 VITALS
SYSTOLIC BLOOD PRESSURE: 149 MMHG | WEIGHT: 174 LBS | HEART RATE: 53 BPM | DIASTOLIC BLOOD PRESSURE: 79 MMHG | OXYGEN SATURATION: 98 % | HEIGHT: 66 IN | RESPIRATION RATE: 16 BRPM | BODY MASS INDEX: 27.97 KG/M2 | TEMPERATURE: 97.6 F

## 2025-08-06 DIAGNOSIS — N40.0 BENIGN NON-NODULAR PROSTATIC HYPERPLASIA WITHOUT LOWER URINARY TRACT SYMPTOMS: ICD-10-CM

## 2025-08-06 DIAGNOSIS — R26.2 WALKING TROUBLES: ICD-10-CM

## 2025-08-06 DIAGNOSIS — I10 ESSENTIAL HYPERTENSION WITH GOAL BLOOD PRESSURE LESS THAN 140/90: ICD-10-CM

## 2025-08-06 DIAGNOSIS — E11.65 TYPE 2 DIABETES MELLITUS WITH HYPERGLYCEMIA, WITHOUT LONG-TERM CURRENT USE OF INSULIN (H): ICD-10-CM

## 2025-08-06 DIAGNOSIS — E78.5 HYPERLIPIDEMIA LDL GOAL <100: ICD-10-CM

## 2025-08-06 DIAGNOSIS — Z00.00 ENCOUNTER FOR MEDICARE ANNUAL WELLNESS EXAM: Primary | ICD-10-CM

## 2025-08-06 DIAGNOSIS — Z23 NEED FOR VACCINATION: ICD-10-CM

## 2025-08-06 LAB
CREAT UR-MCNC: 37.2 MG/DL
EST. AVERAGE GLUCOSE BLD GHB EST-MCNC: 137 MG/DL
HBA1C MFR BLD: 6.4 % (ref 0–5.6)
MICROALBUMIN UR-MCNC: <12 MG/L
MICROALBUMIN/CREAT UR: NORMAL MG/G{CREAT}

## 2025-08-06 PROCEDURE — 99207 PR FOOT EXAM NO CHARGE: CPT | Performed by: FAMILY MEDICINE

## 2025-08-06 PROCEDURE — 36415 COLL VENOUS BLD VENIPUNCTURE: CPT | Performed by: FAMILY MEDICINE

## 2025-08-06 PROCEDURE — 3044F HG A1C LEVEL LT 7.0%: CPT | Performed by: FAMILY MEDICINE

## 2025-08-06 PROCEDURE — 82570 ASSAY OF URINE CREATININE: CPT | Performed by: FAMILY MEDICINE

## 2025-08-06 PROCEDURE — 3078F DIAST BP <80 MM HG: CPT | Performed by: FAMILY MEDICINE

## 2025-08-06 PROCEDURE — G2211 COMPLEX E/M VISIT ADD ON: HCPCS | Performed by: FAMILY MEDICINE

## 2025-08-06 PROCEDURE — 80061 LIPID PANEL: CPT | Performed by: FAMILY MEDICINE

## 2025-08-06 PROCEDURE — 82043 UR ALBUMIN QUANTITATIVE: CPT | Performed by: FAMILY MEDICINE

## 2025-08-06 PROCEDURE — 91320 SARSCV2 VAC 30MCG TRS-SUC IM: CPT | Performed by: FAMILY MEDICINE

## 2025-08-06 PROCEDURE — G0439 PPPS, SUBSEQ VISIT: HCPCS | Performed by: FAMILY MEDICINE

## 2025-08-06 PROCEDURE — 99214 OFFICE O/P EST MOD 30 MIN: CPT | Mod: 25 | Performed by: FAMILY MEDICINE

## 2025-08-06 PROCEDURE — 3077F SYST BP >= 140 MM HG: CPT | Performed by: FAMILY MEDICINE

## 2025-08-06 PROCEDURE — 83036 HEMOGLOBIN GLYCOSYLATED A1C: CPT | Performed by: FAMILY MEDICINE

## 2025-08-06 PROCEDURE — 80048 BASIC METABOLIC PNL TOTAL CA: CPT | Performed by: FAMILY MEDICINE

## 2025-08-06 PROCEDURE — 90480 ADMN SARSCOV2 VAC 1/ONLY CMP: CPT | Performed by: FAMILY MEDICINE

## 2025-08-06 RX ORDER — DOXAZOSIN 4 MG/1
4 TABLET ORAL AT BEDTIME
Qty: 90 TABLET | Refills: 3 | Status: SHIPPED | OUTPATIENT
Start: 2025-08-06

## 2025-08-06 RX ORDER — PRAVASTATIN SODIUM 40 MG
40 TABLET ORAL DAILY
Qty: 90 TABLET | Refills: 3 | Status: SHIPPED | OUTPATIENT
Start: 2025-08-06

## 2025-08-06 RX ORDER — LISINOPRIL 5 MG/1
5 TABLET ORAL DAILY
Qty: 90 TABLET | Refills: 3 | Status: SHIPPED | OUTPATIENT
Start: 2025-08-06

## 2025-08-06 RX ORDER — PIOGLITAZONE 45 MG/1
45 TABLET ORAL DAILY
Qty: 90 TABLET | Refills: 3 | Status: SHIPPED | OUTPATIENT
Start: 2025-08-06

## 2025-08-06 RX ORDER — GLIPIZIDE 10 MG/1
10 TABLET ORAL DAILY
Qty: 90 TABLET | Refills: 0 | Status: CANCELLED | OUTPATIENT
Start: 2025-08-06

## 2025-08-06 RX ORDER — METOPROLOL SUCCINATE 25 MG/1
12.5 TABLET, EXTENDED RELEASE ORAL DAILY
Qty: 90 TABLET | Refills: 1 | Status: SHIPPED | OUTPATIENT
Start: 2025-08-06

## 2025-08-07 ENCOUNTER — RESULTS FOLLOW-UP (OUTPATIENT)
Dept: FAMILY MEDICINE | Facility: CLINIC | Age: 80
End: 2025-08-07
Payer: MEDICARE

## 2025-08-07 ENCOUNTER — PATIENT OUTREACH (OUTPATIENT)
Dept: CARE COORDINATION | Facility: CLINIC | Age: 80
End: 2025-08-07
Payer: MEDICARE

## 2025-08-07 DIAGNOSIS — E87.5 HYPERKALEMIA: Primary | ICD-10-CM

## 2025-08-07 LAB
ANION GAP SERPL CALCULATED.3IONS-SCNC: 10 MMOL/L (ref 7–15)
BUN SERPL-MCNC: 30.6 MG/DL (ref 8–23)
CALCIUM SERPL-MCNC: 9.9 MG/DL (ref 8.8–10.4)
CHLORIDE SERPL-SCNC: 104 MMOL/L (ref 98–107)
CHOLEST SERPL-MCNC: 152 MG/DL
CREAT SERPL-MCNC: 1 MG/DL (ref 0.67–1.17)
EGFRCR SERPLBLD CKD-EPI 2021: 76 ML/MIN/1.73M2
FASTING STATUS PATIENT QL REPORTED: YES
FASTING STATUS PATIENT QL REPORTED: YES
GLUCOSE SERPL-MCNC: 142 MG/DL (ref 70–99)
HCO3 SERPL-SCNC: 25 MMOL/L (ref 22–29)
HDLC SERPL-MCNC: 50 MG/DL
LDLC SERPL CALC-MCNC: 88 MG/DL
NONHDLC SERPL-MCNC: 102 MG/DL
POTASSIUM SERPL-SCNC: 5.6 MMOL/L (ref 3.4–5.3)
SODIUM SERPL-SCNC: 139 MMOL/L (ref 135–145)
TRIGL SERPL-MCNC: 72 MG/DL

## 2025-08-11 ENCOUNTER — PATIENT OUTREACH (OUTPATIENT)
Dept: CARE COORDINATION | Facility: CLINIC | Age: 80
End: 2025-08-11
Payer: MEDICARE

## 2025-08-13 ENCOUNTER — THERAPY VISIT (OUTPATIENT)
Dept: PHYSICAL THERAPY | Facility: CLINIC | Age: 80
End: 2025-08-13
Attending: FAMILY MEDICINE
Payer: MEDICARE

## 2025-08-13 DIAGNOSIS — R26.2 WALKING TROUBLES: ICD-10-CM

## 2025-08-13 DIAGNOSIS — R53.1 DECREASED STRENGTH: Primary | ICD-10-CM

## 2025-08-29 ENCOUNTER — THERAPY VISIT (OUTPATIENT)
Dept: PHYSICAL THERAPY | Facility: CLINIC | Age: 80
End: 2025-08-29
Payer: MEDICARE

## 2025-08-29 DIAGNOSIS — R26.2 WALKING TROUBLES: ICD-10-CM

## 2025-08-29 DIAGNOSIS — R26.89 BALANCE PROBLEMS: Primary | ICD-10-CM

## 2025-08-29 DIAGNOSIS — R53.1 DECREASED STRENGTH: ICD-10-CM

## 2025-08-29 PROCEDURE — 97112 NEUROMUSCULAR REEDUCATION: CPT | Mod: GP | Performed by: PHYSICAL THERAPIST

## 2025-08-29 PROCEDURE — 97110 THERAPEUTIC EXERCISES: CPT | Mod: GP | Performed by: PHYSICAL THERAPIST

## (undated) DEVICE — KIT ENDO TURNOVER/PROCEDURE W/CLEAN A SCOPE LINERS 103888

## (undated) RX ORDER — FENTANYL CITRATE 50 UG/ML
INJECTION, SOLUTION INTRAMUSCULAR; INTRAVENOUS
Status: DISPENSED
Start: 2024-10-15